# Patient Record
Sex: FEMALE | Race: WHITE | NOT HISPANIC OR LATINO | Employment: FULL TIME | URBAN - METROPOLITAN AREA
[De-identification: names, ages, dates, MRNs, and addresses within clinical notes are randomized per-mention and may not be internally consistent; named-entity substitution may affect disease eponyms.]

---

## 2017-01-02 ENCOUNTER — RESULTS ENCOUNTER (OUTPATIENT)
Dept: FAMILY MEDICINE CLINIC | Facility: CLINIC | Age: 42
End: 2017-01-02

## 2017-01-02 DIAGNOSIS — F98.8 ADD (ATTENTION DEFICIT DISORDER): ICD-10-CM

## 2017-01-02 DIAGNOSIS — I10 ESSENTIAL HYPERTENSION: ICD-10-CM

## 2017-01-02 DIAGNOSIS — M51.26 HERNIATED LUMBAR INTERVERTEBRAL DISC: ICD-10-CM

## 2017-01-02 DIAGNOSIS — Z01.818 PREOPERATIVE CLEARANCE: ICD-10-CM

## 2017-01-02 DIAGNOSIS — G40.109 SIMPLE PARTIAL EPILEPSY (HCC): ICD-10-CM

## 2017-01-10 RX ORDER — ESOMEPRAZOLE MAGNESIUM 40 MG/1
40 CAPSULE, DELAYED RELEASE ORAL
Qty: 30 CAPSULE | Refills: 11 | Status: SHIPPED | OUTPATIENT
Start: 2017-01-10 | End: 2017-04-21 | Stop reason: SDUPTHER

## 2017-01-10 RX ORDER — ESOMEPRAZOLE MAGNESIUM 40 MG/1
40 FOR SUSPENSION ORAL
COMMUNITY
End: 2017-01-10

## 2017-03-02 DIAGNOSIS — F98.8 ADD (ATTENTION DEFICIT DISORDER): ICD-10-CM

## 2017-03-02 RX ORDER — DEXTROAMPHETAMINE SACCHARATE, AMPHETAMINE ASPARTATE, DEXTROAMPHETAMINE SULFATE AND AMPHETAMINE SULFATE 7.5; 7.5; 7.5; 7.5 MG/1; MG/1; MG/1; MG/1
30 TABLET ORAL 2 TIMES DAILY
Qty: 60 TABLET | Refills: 0
Start: 2017-03-02 | End: 2017-04-21

## 2017-03-03 ENCOUNTER — TELEPHONE (OUTPATIENT)
Dept: NEUROLOGY | Facility: CLINIC | Age: 42
End: 2017-03-03

## 2017-03-03 NOTE — TELEPHONE ENCOUNTER
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 Dr. Calvillo does not have any openings until May 8th, but they are doing testing during that time for 2 weeks and she can't take off during testing. She is scheduled for May 30th.  She wanted Dr. Calvillo to know that she is still having seizures.  She would like her to call her on the above number.

## 2017-03-06 RX ORDER — OXCARBAZEPINE 600 MG/1
600 TABLET, FILM COATED ORAL 2 TIMES DAILY
Qty: 60 TABLET | Refills: 5 | Status: SHIPPED | OUTPATIENT
Start: 2017-03-06 | End: 2017-04-05

## 2017-03-06 NOTE — TELEPHONE ENCOUNTER
Called the patient and talked to her and increase the dose of oxcarbazepine to 600 mg by mouth twice a day and told her to call me in 2 weeks and if no improvement in her seizures, will consider third medication for seizure or VNS placement.

## 2017-04-18 ENCOUNTER — TELEPHONE (OUTPATIENT)
Dept: NEUROLOGY | Facility: CLINIC | Age: 42
End: 2017-04-18

## 2017-04-18 NOTE — TELEPHONE ENCOUNTER
----- Message from Kira Wray sent at 4/18/2017 10:40 AM EDT -----  Contact: [phone]  Pt said that she is still having seizures.

## 2017-04-21 ENCOUNTER — OFFICE VISIT (OUTPATIENT)
Dept: FAMILY MEDICINE CLINIC | Facility: CLINIC | Age: 42
End: 2017-04-21

## 2017-04-21 VITALS
SYSTOLIC BLOOD PRESSURE: 110 MMHG | DIASTOLIC BLOOD PRESSURE: 70 MMHG | TEMPERATURE: 97.8 F | RESPIRATION RATE: 16 BRPM | HEART RATE: 91 BPM | HEIGHT: 62 IN | BODY MASS INDEX: 33.86 KG/M2 | WEIGHT: 184 LBS | OXYGEN SATURATION: 98 %

## 2017-04-21 DIAGNOSIS — J45.30 MILD PERSISTENT ASTHMA WITHOUT COMPLICATION: ICD-10-CM

## 2017-04-21 DIAGNOSIS — K21.00 GASTROESOPHAGEAL REFLUX DISEASE WITH ESOPHAGITIS: ICD-10-CM

## 2017-04-21 DIAGNOSIS — J30.89 SEASONAL ALLERGIC RHINITIS DUE TO OTHER ALLERGIC TRIGGER: ICD-10-CM

## 2017-04-21 DIAGNOSIS — F98.8 ADD (ATTENTION DEFICIT DISORDER): Primary | ICD-10-CM

## 2017-04-21 DIAGNOSIS — G31.84 MILD COGNITIVE IMPAIRMENT: ICD-10-CM

## 2017-04-21 DIAGNOSIS — E03.9 ACQUIRED HYPOTHYROIDISM: ICD-10-CM

## 2017-04-21 DIAGNOSIS — I10 ESSENTIAL HYPERTENSION: ICD-10-CM

## 2017-04-21 PROBLEM — J30.2 SEASONAL ALLERGIC RHINITIS: Status: ACTIVE | Noted: 2017-04-21

## 2017-04-21 LAB
25(OH)D3+25(OH)D2 SERPL-MCNC: 38.3 NG/ML (ref 30–100)
ALBUMIN SERPL-MCNC: 4.6 G/DL (ref 3.5–5.2)
ALBUMIN/GLOB SERPL: 1.7 G/DL
ALP SERPL-CCNC: 100 U/L (ref 39–117)
ALT SERPL-CCNC: 15 U/L (ref 1–33)
AST SERPL-CCNC: 12 U/L (ref 1–32)
BASOPHILS # BLD AUTO: 0.03 10*3/MM3 (ref 0–0.2)
BASOPHILS NFR BLD AUTO: 0.6 % (ref 0–1.5)
BILIRUB SERPL-MCNC: 0.3 MG/DL (ref 0.1–1.2)
BUN SERPL-MCNC: 13 MG/DL (ref 6–20)
BUN/CREAT SERPL: 14.6 (ref 7–25)
CALCIUM SERPL-MCNC: 9.7 MG/DL (ref 8.6–10.5)
CHLORIDE SERPL-SCNC: 102 MMOL/L (ref 98–107)
CHOLEST SERPL-MCNC: 230 MG/DL (ref 0–200)
CO2 SERPL-SCNC: 28.8 MMOL/L (ref 22–29)
CREAT SERPL-MCNC: 0.89 MG/DL (ref 0.57–1)
EOSINOPHIL # BLD AUTO: 0.27 10*3/MM3 (ref 0–0.7)
EOSINOPHIL NFR BLD AUTO: 5.3 % (ref 0.3–6.2)
ERYTHROCYTE [DISTWIDTH] IN BLOOD BY AUTOMATED COUNT: 12.7 % (ref 11.7–13)
GLOBULIN SER CALC-MCNC: 2.7 GM/DL
GLUCOSE SERPL-MCNC: 91 MG/DL (ref 65–99)
HCT VFR BLD AUTO: 43.3 % (ref 35.6–45.5)
HDLC SERPL-MCNC: 79 MG/DL (ref 40–60)
HGB BLD-MCNC: 14.2 G/DL (ref 11.9–15.5)
IMM GRANULOCYTES # BLD: 0 10*3/MM3 (ref 0–0.03)
IMM GRANULOCYTES NFR BLD: 0 % (ref 0–0.5)
LDLC SERPL CALC-MCNC: 133 MG/DL (ref 0–100)
LYMPHOCYTES # BLD AUTO: 1.21 10*3/MM3 (ref 0.9–4.8)
LYMPHOCYTES NFR BLD AUTO: 24 % (ref 19.6–45.3)
MCH RBC QN AUTO: 31.6 PG (ref 26.9–32)
MCHC RBC AUTO-ENTMCNC: 32.8 G/DL (ref 32.4–36.3)
MCV RBC AUTO: 96.4 FL (ref 80.5–98.2)
MONOCYTES # BLD AUTO: 0.44 10*3/MM3 (ref 0.2–1.2)
MONOCYTES NFR BLD AUTO: 8.7 % (ref 5–12)
NEUTROPHILS # BLD AUTO: 3.1 10*3/MM3 (ref 1.9–8.1)
NEUTROPHILS NFR BLD AUTO: 61.4 % (ref 42.7–76)
PLATELET # BLD AUTO: 302 10*3/MM3 (ref 140–500)
POTASSIUM SERPL-SCNC: 4.4 MMOL/L (ref 3.5–5.2)
PROT SERPL-MCNC: 7.3 G/DL (ref 6–8.5)
RBC # BLD AUTO: 4.49 10*6/MM3 (ref 3.9–5.2)
SODIUM SERPL-SCNC: 141 MMOL/L (ref 136–145)
T4 FREE SERPL-MCNC: 0.76 NG/DL (ref 0.93–1.7)
TRIGL SERPL-MCNC: 90 MG/DL (ref 0–150)
TSH SERPL DL<=0.005 MIU/L-ACNC: 2.21 MIU/ML (ref 0.27–4.2)
VLDLC SERPL CALC-MCNC: 18 MG/DL (ref 5–40)
WBC # BLD AUTO: 5.05 10*3/MM3 (ref 4.5–10.7)

## 2017-04-21 PROCEDURE — 99213 OFFICE O/P EST LOW 20 MIN: CPT | Performed by: PHYSICIAN ASSISTANT

## 2017-04-21 RX ORDER — MONTELUKAST SODIUM 10 MG/1
10 TABLET ORAL NIGHTLY
Qty: 30 TABLET | Refills: 11 | Status: SHIPPED | OUTPATIENT
Start: 2017-04-21 | End: 2017-05-08 | Stop reason: SDUPTHER

## 2017-04-21 RX ORDER — AMLODIPINE BESYLATE 10 MG/1
10 TABLET ORAL DAILY
Qty: 30 TABLET | Refills: 5 | Status: SHIPPED | OUTPATIENT
Start: 2017-04-21 | End: 2017-05-08 | Stop reason: SDUPTHER

## 2017-04-21 RX ORDER — OXCARBAZEPINE 300 MG/1
600 TABLET, FILM COATED ORAL 2 TIMES DAILY
COMMUNITY
End: 2018-04-02

## 2017-04-21 RX ORDER — ALBUTEROL SULFATE 90 UG/1
2 AEROSOL, METERED RESPIRATORY (INHALATION) EVERY 4 HOURS PRN
Qty: 18 G | Refills: 11 | Status: SHIPPED | OUTPATIENT
Start: 2017-04-21 | End: 2018-03-02 | Stop reason: CLARIF

## 2017-04-21 RX ORDER — ESOMEPRAZOLE MAGNESIUM 40 MG/1
40 CAPSULE, DELAYED RELEASE ORAL
Qty: 30 CAPSULE | Refills: 11 | Status: SHIPPED | OUTPATIENT
Start: 2017-04-21 | End: 2018-07-01 | Stop reason: SDUPTHER

## 2017-04-21 NOTE — PROGRESS NOTES
Subjective   Lissette Rea is a 42 y.o. female.     History of Present Illness   Lissette Rea 42 y.o. female who presents today for routine follow up check and medication refills.  she has a history of   Patient Active Problem List   Diagnosis   • Mild cognitive impairment   • ADD (attention deficit disorder)   • Essential hypertension   • Mild persistent asthma without complication   • Migraine without aura and without status migrainosus, not intractable   • Impaired visual perception   • Gastroesophageal reflux disease with esophagitis   • Abnormal electroencephalogram (EEG)   • Lumbar radiculopathy   • Degeneration of intervertebral disc of lumbar region   • Spinal stenosis of lumbar region   • Herniated lumbar intervertebral disc   • Class 1 obesity   • Postoperative state   • Moderate persistent asthma with acute exacerbation   • Simple partial epilepsy   • Seasonal allergic rhinitis   .  Since the last visit, she has overall felt well.  She has Hypertenision and is well controlled on medication and GERD and is well controlled on PPI medication.  she has been compliant with current medications have reviewed them.  The patient denies medication side effects.  Asthma is controlled with medication and rarely uses Albuterol.   Will refill allergy/asthma    She is not driving  Has seizure d/o and sees neuro        Lissette Rea 42 y.o. female who presents today for ADD type cognitive impairment  she has a history of   Patient Active Problem List   Diagnosis   • Mild cognitive impairment   • ADD (attention deficit disorder)   • Essential hypertension   • Mild persistent asthma without complication   • Migraine without aura and without status migrainosus, not intractable   • Impaired visual perception   • Gastroesophageal reflux disease with esophagitis   • Abnormal electroencephalogram (EEG)   • Lumbar radiculopathy   • Degeneration of intervertebral disc of lumbar region   • Spinal stenosis of lumbar  region   • Herniated lumbar intervertebral disc   • Class 1 obesity   • Postoperative state   • Moderate persistent asthma with acute exacerbation   • Simple partial epilepsy   • Seasonal allergic rhinitis   .    She denies anxiety and depression;  No hx addiction    [facility] neuropsych eval 7-1-15    She reports less focus in last few weeks Adderall.  She will try Vyvanse and will have appt to refill        The following portions of the patient's history were reviewed and updated as appropriate: allergies, current medications, past family history, past medical history, past social history, past surgical history and problem list.    Review of Systems   Constitutional: Negative for activity change, appetite change and unexpected weight change.   HENT: Negative for nosebleeds and trouble swallowing.    Eyes: Negative for pain and visual disturbance.   Respiratory: Negative for chest tightness, shortness of breath and wheezing.    Cardiovascular: Negative for chest pain and palpitations.   Gastrointestinal: Negative for abdominal pain and blood in stool.   Endocrine: Negative.    Genitourinary: Negative for difficulty urinating and hematuria.   Musculoskeletal: Negative for joint swelling.   Skin: Negative for color change and rash.   Allergic/Immunologic: Negative.    Neurological: Positive for seizures. Negative for syncope and speech difficulty.   Hematological: Negative for adenopathy.   Psychiatric/Behavioral: Negative for agitation and confusion.   All other systems reviewed and are negative.      Objective   Physical Exam   Constitutional: She is oriented to person, place, and time. She appears well-developed and well-nourished. No distress.   HENT:   Head: Normocephalic and atraumatic.   Mouth/Throat: No oropharyngeal exudate.   Eyes: Conjunctivae and EOM are normal. Pupils are equal, round, and reactive to light. Right eye exhibits no discharge. Left eye exhibits no discharge. No scleral icterus.    Neck: Normal range of motion. Neck supple. No tracheal deviation present. No thyromegaly present.   Cardiovascular: Normal rate, regular rhythm, normal heart sounds, intact distal pulses and normal pulses.  Exam reveals no gallop.    No murmur heard.  Pulmonary/Chest: Effort normal and breath sounds normal. No respiratory distress. She has no wheezes. She has no rales.   Musculoskeletal: Normal range of motion.   Neurological: She is alert and oriented to person, place, and time. She exhibits normal muscle tone. Coordination normal.   Skin: Skin is warm. No rash noted. No erythema. No pallor.   Psychiatric: She has a normal mood and affect. Her behavior is normal. Judgment and thought content normal.   Nursing note and vitals reviewed.      Assessment/Plan   Problems Addressed this Visit        Cardiovascular and Mediastinum    Essential hypertension    Relevant Medications    amLODIPine (NORVASC) 10 MG tablet    levothyroxine (LEVOTHROID) 25 MCG tablet    Other Relevant Orders    Comprehensive metabolic panel (Completed)    Lipid panel (Completed)    CBC and Differential (Completed)    TSH (Completed)    T4, Free (Completed)    Vitamin D 25 Hydroxy (Completed)    T4, Free    TSH       Respiratory    Mild persistent asthma without complication    Relevant Medications    albuterol (PROVENTIL HFA;VENTOLIN HFA) 108 (90 BASE) MCG/ACT inhaler    montelukast (SINGULAIR) 10 MG tablet    fluticasone-salmeterol (ADVAIR DISKUS) 250-50 MCG/DOSE DISKUS    levothyroxine (LEVOTHROID) 25 MCG tablet    Other Relevant Orders    Comprehensive metabolic panel (Completed)    Lipid panel (Completed)    CBC and Differential (Completed)    TSH (Completed)    T4, Free (Completed)    Vitamin D 25 Hydroxy (Completed)    T4, Free    TSH    Seasonal allergic rhinitis    Relevant Medications    levothyroxine (LEVOTHROID) 25 MCG tablet    Other Relevant Orders    Comprehensive metabolic panel (Completed)    Lipid panel (Completed)    CBC and  Differential (Completed)    TSH (Completed)    T4, Free (Completed)    Vitamin D 25 Hydroxy (Completed)    T4, Free    TSH       Digestive    Gastroesophageal reflux disease with esophagitis    Relevant Medications    esomeprazole (NEXIUM) 40 MG capsule    levothyroxine (LEVOTHROID) 25 MCG tablet    Other Relevant Orders    Comprehensive metabolic panel (Completed)    Lipid panel (Completed)    CBC and Differential (Completed)    TSH (Completed)    T4, Free (Completed)    Vitamin D 25 Hydroxy (Completed)    T4, Free    TSH       Nervous and Auditory    Mild cognitive impairment    Relevant Medications    levothyroxine (LEVOTHROID) 25 MCG tablet    Other Relevant Orders    Comprehensive metabolic panel (Completed)    Lipid panel (Completed)    CBC and Differential (Completed)    TSH (Completed)    T4, Free (Completed)    Vitamin D 25 Hydroxy (Completed)    T4, Free    TSH       Other    ADD (attention deficit disorder) - Primary    Relevant Medications    levothyroxine (LEVOTHROID) 25 MCG tablet    lisdexamfetamine (VYVANSE) 50 MG capsule    Other Relevant Orders    Comprehensive metabolic panel (Completed)    Lipid panel (Completed)    CBC and Differential (Completed)    TSH (Completed)    T4, Free (Completed)    Vitamin D 25 Hydroxy (Completed)    T4, Free    TSH      Other Visit Diagnoses     Acquired hypothyroidism        Relevant Medications    levothyroxine (LEVOTHROID) 25 MCG tablet                I have reviewed the notes, assessments, and/or procedures performed by Trang Doherty PA-C, I concur with her/his documentation of Lissette Rea.

## 2017-04-21 NOTE — PATIENT INSTRUCTIONS
Stop Adderall and change to Vyvanse 50mg;  appt to refill or change  Stop Vyvanse if it makes your heart pound and/or race.  It can effect you being able to fall asleep.  It can be habit forming.  Do not share you medication with anyone.  Do not drink alcohol with this medication.  Use the lowest effective dose.    Low glycemic index diet  Exercise 30 minutes most days of the week  Make sure you get results on any labs or tests we ordered today  We discussed medications and how to take them as prescribed  Sleep 6-8 hours each night if possible  If you have not signed up for [employer], please activate your code ASAP from your After Visit Summary today    LDL goal <100  LDL goal if heart disease <70  HDL goal >60  Triglyceride goal <150  BP goal =<130/80  Fasting glucose <100

## 2017-04-22 RX ORDER — LEVOTHYROXINE SODIUM 0.03 MG/1
25 TABLET ORAL DAILY
Qty: 90 TABLET | Refills: 3 | Status: SHIPPED | OUTPATIENT
Start: 2017-04-22 | End: 2017-06-22

## 2017-04-28 ENCOUNTER — TELEPHONE (OUTPATIENT)
Dept: NEUROLOGY | Facility: CLINIC | Age: 42
End: 2017-04-28

## 2017-04-28 DIAGNOSIS — G40.109 SIMPLE PARTIAL EPILEPSY (HCC): Primary | ICD-10-CM

## 2017-04-28 NOTE — TELEPHONE ENCOUNTER
Called the patient and talked to her and will start her on Vimpat 50 mg by mouth twice a day and told her to come and  the samples on Monday and will refer her to Dr. Wesley for seizure management.

## 2017-04-28 NOTE — TELEPHONE ENCOUNTER
----- Message from Kira Wray sent at 4/28/2017  3:00 PM EDT -----  Contact: [phone]  Dr. Calvillo is referring this patient to  for seizures.

## 2017-05-08 RX ORDER — MONTELUKAST SODIUM 10 MG/1
10 TABLET ORAL NIGHTLY
Qty: 90 TABLET | Refills: 3 | Status: SHIPPED | OUTPATIENT
Start: 2017-05-08 | End: 2018-08-07 | Stop reason: SDUPTHER

## 2017-05-08 RX ORDER — AMLODIPINE BESYLATE 10 MG/1
10 TABLET ORAL DAILY
Qty: 90 TABLET | Refills: 1 | Status: SHIPPED | OUTPATIENT
Start: 2017-05-08 | End: 2018-05-07 | Stop reason: SDUPTHER

## 2017-05-12 RX ORDER — LACOSAMIDE 50 MG/1
50 TABLET ORAL EVERY 12 HOURS SCHEDULED
Qty: 60 TABLET | Refills: 5 | Status: SHIPPED | OUTPATIENT
Start: 2017-05-12 | End: 2018-04-02

## 2017-05-15 RX ORDER — ZONISAMIDE 100 MG/1
200 CAPSULE ORAL DAILY
Qty: 180 CAPSULE | Refills: 3 | Status: SHIPPED | OUTPATIENT
Start: 2017-05-15 | End: 2017-10-30

## 2017-05-30 ENCOUNTER — OFFICE VISIT (OUTPATIENT)
Dept: FAMILY MEDICINE CLINIC | Facility: CLINIC | Age: 42
End: 2017-05-30

## 2017-05-30 VITALS
TEMPERATURE: 98.2 F | HEIGHT: 62 IN | OXYGEN SATURATION: 98 % | RESPIRATION RATE: 16 BRPM | WEIGHT: 184 LBS | SYSTOLIC BLOOD PRESSURE: 110 MMHG | HEART RATE: 83 BPM | BODY MASS INDEX: 33.86 KG/M2 | DIASTOLIC BLOOD PRESSURE: 80 MMHG

## 2017-05-30 DIAGNOSIS — E03.9 ACQUIRED HYPOTHYROIDISM: ICD-10-CM

## 2017-05-30 DIAGNOSIS — F98.8 ADD (ATTENTION DEFICIT DISORDER): Primary | ICD-10-CM

## 2017-05-30 PROCEDURE — 99213 OFFICE O/P EST LOW 20 MIN: CPT | Performed by: PHYSICIAN ASSISTANT

## 2017-06-01 ENCOUNTER — APPOINTMENT (OUTPATIENT)
Dept: WOMENS IMAGING | Facility: HOSPITAL | Age: 42
End: 2017-06-01

## 2017-06-01 PROCEDURE — 76641 ULTRASOUND BREAST COMPLETE: CPT | Performed by: RADIOLOGY

## 2017-06-21 LAB
T4 FREE SERPL-MCNC: 0.79 NG/DL (ref 0.93–1.7)
TSH SERPL DL<=0.005 MIU/L-ACNC: 1.7 MIU/ML (ref 0.27–4.2)

## 2017-06-22 DIAGNOSIS — E03.9 ACQUIRED HYPOTHYROIDISM: Primary | ICD-10-CM

## 2017-06-22 RX ORDER — LEVOTHYROXINE SODIUM 0.05 MG/1
50 TABLET ORAL DAILY
Qty: 90 TABLET | Refills: 3 | Status: SHIPPED | OUTPATIENT
Start: 2017-06-22 | End: 2018-07-01 | Stop reason: SDUPTHER

## 2017-06-27 ENCOUNTER — OFFICE VISIT (OUTPATIENT)
Dept: FAMILY MEDICINE CLINIC | Facility: CLINIC | Age: 42
End: 2017-06-27

## 2017-06-27 VITALS
SYSTOLIC BLOOD PRESSURE: 124 MMHG | DIASTOLIC BLOOD PRESSURE: 78 MMHG | WEIGHT: 181 LBS | HEIGHT: 62 IN | RESPIRATION RATE: 16 BRPM | TEMPERATURE: 97.9 F | BODY MASS INDEX: 33.31 KG/M2 | OXYGEN SATURATION: 98 % | HEART RATE: 79 BPM

## 2017-06-27 DIAGNOSIS — F98.8 ADD (ATTENTION DEFICIT DISORDER): ICD-10-CM

## 2017-06-27 DIAGNOSIS — E03.9 ACQUIRED HYPOTHYROIDISM: Primary | ICD-10-CM

## 2017-06-27 PROCEDURE — 99213 OFFICE O/P EST LOW 20 MIN: CPT | Performed by: PHYSICIAN ASSISTANT

## 2017-06-27 NOTE — PATIENT INSTRUCTIONS
Labs 6 weeks for thyroid    Stop Vyvanse if it makes your heart pound and/or race.  It can effect you being able to fall asleep.  It can be habit forming.  Do not share you medication with anyone.  Do not drink alcohol with this medication.  Use the lowest effective dose.

## 2017-06-27 NOTE — PROGRESS NOTES
Subjective   Lissette Rea is a 42 y.o. female.     History of Present Illness   Lissette Rea 42 y.o. female who presents today for routine follow up check and medication refills.  she has a history of   Patient Active Problem List   Diagnosis   • Mild cognitive impairment   • ADD (attention deficit disorder)   • Essential hypertension   • Mild persistent asthma without complication   • Migraine without aura and without status migrainosus, not intractable   • Impaired visual perception   • Gastroesophageal reflux disease with esophagitis   • Abnormal electroencephalogram (EEG)   • Lumbar radiculopathy   • Degeneration of intervertebral disc of lumbar region   • Spinal stenosis of lumbar region   • Herniated lumbar intervertebral disc   • Class 1 obesity   • Postoperative state   • Moderate persistent asthma with acute exacerbation   • Simple partial epilepsy   • Seasonal allergic rhinitis   .  Since the last visit, she has overall felt well.  She has hypothyroidism and here to discuss abnormal lab results.  she has been compliant with current medications have reviewed them.  The patient denies medication side effects.    Lab Results   Component Value Date    TSH 1.700 06/21/2017     Last free T 4 was about the same at 0.79  I did raise thyroid dose to 50mcg with labs to follow in 6 weeks;  If no improvement, will consult endocrine.      Lissette Rea 42 y.o. female who presents for ADD/ADHD follow up from dose increase last visit. She is noting Vyvanse is more effective than Adderall.  She is to change to Dr. Wesley for f/u on epilepsy  No new problems with insomnia, tachycardia,or weight loss. The patient has read and signed the [facility] Controlled Substance Contract. I will continue to see patient for regular follow up appointments and give the lowest effective dose. They are well controlled on their medication at the lowest effective dose. She has a previous history of a diagnosis of ADD/ADHD.  Dx 2015 Jess GOLD has been reviewed by me and is updated every 3 months. She is on Vimpat now. The patient is aware of the potential for addiction and dependence. The Rx continues to help them concentrate, finish tasks in timely manor, and succeed. She denies current suicidal and homicidal ideation.  Reviewed causes for potential of discontinuation of stimulant medication, including but not limited to consideration for diversion, obtaining other controlled substances from other license practitioners, or inappropriate office behavior. Patient understands to use a controlled substance as prescribed by physician and to avoid improper use of controlled substances. Patient will also verbalized understanding that prescriptions to be filled at the same pharmacy unless office staff is made aware of this. Patient understands they can be submitted to random urine drug screens and/or random pill counts on any request. Patient understands they are not to receive early refills. The patient must produce an official police report for any effort to replace controlled substances that are lost or stolen. No use of illegal street drugs while receiving controlled substances from this prescribing provider. The patient is to take medication as prescribed and not deviate from the normal schedule without consultation from the provider. Patient is not to share the medication with others or to take medication with alcohol or other sedatives. Use caution when driving or operating machinery. Must always keep the prescription in the original container. Patient is to store controlled substances in a locked cabinet or other secure storage unit that is cool, dry and out of sunlight. Patient must immediately notify office if this controlled substances is improperly taken by another individual. Reviewed risk for physical dependence, tolerance and addiction.   There is no evidence of aberrant behavior or any red flags. .      No family or  personal history of addiction.  [facility]uropsych eval 7-1-15  The following portions of the patient's history were reviewed and updated as appropriate: allergies, current medications, past family history, past medical history, past social history, past surgical history and problem list.    Review of Systems   Constitutional: Negative for activity change, appetite change and unexpected weight change.   HENT: Negative for nosebleeds and trouble swallowing.    Eyes: Negative for pain and visual disturbance.   Respiratory: Negative for chest tightness, shortness of breath and wheezing.    Cardiovascular: Negative for chest pain and palpitations.   Gastrointestinal: Negative for abdominal pain and blood in stool.   Endocrine: Negative.    Genitourinary: Negative for difficulty urinating and hematuria.   Musculoskeletal: Negative for joint swelling.   Skin: Negative for color change and rash.   Allergic/Immunologic: Negative.    Neurological: Negative for syncope and speech difficulty.   Hematological: Negative for adenopathy.   Psychiatric/Behavioral: Positive for self-injury. Negative for agitation and confusion.   All other systems reviewed and are negative.      Objective   Physical Exam   Constitutional: She is oriented to person, place, and time. She appears well-developed and well-nourished. No distress.   HENT:   Head: Normocephalic.   Eyes: Conjunctivae and EOM are normal. Pupils are equal, round, and reactive to light.   Neck: Normal range of motion. Neck supple.   Cardiovascular: Normal rate, regular rhythm and normal heart sounds.    No murmur heard.  Pulmonary/Chest: Effort normal and breath sounds normal.   Musculoskeletal: Normal range of motion.   Neurological: She is alert and oriented to person, place, and time.   Skin: Skin is warm and dry. No rash noted. She is not diaphoretic.   Psychiatric: Her behavior is normal. Judgment and thought content normal. Her affect is not inappropriate. She  is not actively hallucinating. Cognition and memory are normal.   Nursing note and vitals reviewed.      Assessment/Plan   Problems Addressed this Visit        Other    ADD (attention deficit disorder)    Relevant Medications    lisdexamfetamine (VYVANSE) 60 MG capsule      Other Visit Diagnoses     Acquired hypothyroidism    -  Primary                I have reviewed the notes, assessments, and/or procedures performed by Trang Doherty PA-C, I concur with her/his documentation of Lissette Rea.

## 2017-06-30 ENCOUNTER — APPOINTMENT (OUTPATIENT)
Dept: LAB | Facility: HOSPITAL | Age: 42
End: 2017-06-30

## 2017-06-30 ENCOUNTER — OFFICE VISIT (OUTPATIENT)
Dept: NEUROLOGY | Facility: CLINIC | Age: 42
End: 2017-06-30

## 2017-06-30 VITALS
HEART RATE: 82 BPM | DIASTOLIC BLOOD PRESSURE: 80 MMHG | OXYGEN SATURATION: 97 % | HEIGHT: 62 IN | BODY MASS INDEX: 32.9 KG/M2 | SYSTOLIC BLOOD PRESSURE: 144 MMHG | WEIGHT: 178.8 LBS

## 2017-06-30 DIAGNOSIS — F98.8 ADD (ATTENTION DEFICIT DISORDER): ICD-10-CM

## 2017-06-30 DIAGNOSIS — G31.84 MILD COGNITIVE IMPAIRMENT: ICD-10-CM

## 2017-06-30 DIAGNOSIS — G40.109 SIMPLE PARTIAL SEIZURE, CONSCIOUSNESS NOT IMPAIRED (HCC): ICD-10-CM

## 2017-06-30 DIAGNOSIS — R94.01 ABNORMAL ELECTROENCEPHALOGRAM (EEG): Primary | ICD-10-CM

## 2017-06-30 DIAGNOSIS — D32.9 MENINGIOMA (HCC): ICD-10-CM

## 2017-06-30 DIAGNOSIS — G43.009 MIGRAINE WITHOUT AURA AND WITHOUT STATUS MIGRAINOSUS, NOT INTRACTABLE: ICD-10-CM

## 2017-06-30 PROBLEM — M43.26 ANKYLOSIS OF LUMBAR SPINE: Status: ACTIVE | Noted: 2017-01-19

## 2017-06-30 PROBLEM — Z98.890 STATUS POST LUMBAR SPINE OPERATION: Status: ACTIVE | Noted: 2017-06-13

## 2017-06-30 LAB
ANION GAP SERPL CALCULATED.3IONS-SCNC: 11.1 MMOL/L
BUN BLD-MCNC: 15 MG/DL (ref 6–20)
BUN/CREAT SERPL: 15.3 (ref 7–25)
CALCIUM SPEC-SCNC: 9.1 MG/DL (ref 8.6–10.5)
CHLORIDE SERPL-SCNC: 106 MMOL/L (ref 98–107)
CO2 SERPL-SCNC: 24.9 MMOL/L (ref 22–29)
CREAT BLD-MCNC: 0.98 MG/DL (ref 0.57–1)
GFR SERPL CREATININE-BSD FRML MDRD: 62 ML/MIN/1.73
GLUCOSE BLD-MCNC: 100 MG/DL (ref 65–99)
POTASSIUM BLD-SCNC: 3.9 MMOL/L (ref 3.5–5.2)
SODIUM BLD-SCNC: 142 MMOL/L (ref 136–145)

## 2017-06-30 PROCEDURE — 36415 COLL VENOUS BLD VENIPUNCTURE: CPT | Performed by: PSYCHIATRY & NEUROLOGY

## 2017-06-30 PROCEDURE — 80183 DRUG SCRN QUANT OXCARBAZEPIN: CPT | Performed by: PSYCHIATRY & NEUROLOGY

## 2017-06-30 PROCEDURE — 80048 BASIC METABOLIC PNL TOTAL CA: CPT | Performed by: PSYCHIATRY & NEUROLOGY

## 2017-06-30 PROCEDURE — 99215 OFFICE O/P EST HI 40 MIN: CPT | Performed by: PSYCHIATRY & NEUROLOGY

## 2017-06-30 PROCEDURE — 80203 DRUG SCREEN QUANT ZONISAMIDE: CPT | Performed by: PSYCHIATRY & NEUROLOGY

## 2017-06-30 NOTE — PROGRESS NOTES
Subjective:     Patient ID: Lissette Rea is a 42 y.o. female.    Seizures    Associated symptoms include confusion. Pertinent negatives include no headaches, no speech difficulty, no visual disturbance, no chest pain and no nausea.     The following portions of the patient's history were reviewed and updated as appropriate: allergies, current medications, past family history, past medical history, past social history, past surgical history and problem list.  Very complex case with multiple medical issues    Possible seizures: She states that she began having sensations of smelling smoke a couple of years ago either in 2014 or 2015.  She saw Dr. Randy LAKE.  Brain MRI from August 2015 showed a small left frontal meningioma.  EEG showed spike and wave activity in the left temporal region.  She was not started on seizure medicine.    She complained of memory problems.  She was on Topamax 200 and Elavil 50 daily when she underwent neuropsychology testing on November 29, 2015.  The results showed full scale IQ 80.  Verbal IQ 78.  Performance IQ 80.  Working memory 71.  Performance index 100.  She was thought to have a combination of ADHD and mild cognitive impairment.  She has continued to work as an .  She has not started medications for memory.  She has a variety of memory complaints the were reviewed from the office note of Dr. PHILLIPS  during the first office visit here.  Mostly they involve word finding but also mistaking names or forgetting items.  In 2015 had normal blood tests for B12 and methylmalonic acid    Migraine: Chronic problem and has been well-controlled in the past on Cambio and currently on zonisamide 200 per day.  Migraines are not an issue.    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 Lora twice, eventually requiring fusion from which she has done quite well and low back pain not a problem currently    Irritable bowel  "syndrome-has controlled the symptoms with medications as listed.  Doesn't feel this is much for problem currently    Asthma-not a problem currently.  Hypertension-controlled on medicine.  Hypothyroidism-on medication with rising T4.  Level    Seizure description: She will have clusters of spells where she will \"smell smoke\".  Spells can last seconds up to 5 minutes.  She can have several in a day, or cluster of several days in a row and then nothing or no spells whatsoever for up to a month.  They're not associated with headache but the more intense spells cause nausea.  The spells can build slowly or can come on suddenly with nausea.  Some spells last up to 15 minutes.  She does not have staring following tongue biting or other usual manifestations of seizures.  They were not associated with a head injury or encephalitis.  She denies any loss of sense of smell.    EEG review: Dr. Lester had 2 EEGs done in 2015-16 showing left frontotemporal spikes.  An angled for EEG through [facility] showed polymorphic delta intermittently, left temporal.    Medications for epilepsy: When seen by Dr. PHILLIPS last November she was already on zonisamide instead of Topamax and was no longer taking Elavil.  Ox carb was added at 300 twice a day.  There was no change in her symptoms.  Last April Vimpat was added at 50 twice a day.  There was still no change in her symptoms.    Mood and depression: She is on her second marriage.  Her  is here today.  The marriage is successful.  She had transient depression associated with the first marriage/divorce but not currently a problem    ADHD: After the diagnosis was made by neuropsych testing she underwent a series of medication treatments.  Initially she seemed to improve it then she has fallen back to her old memory symptoms.  She has now on Vyvanse 60 mg per day      Review of Systems   Constitutional: Negative for activity change, appetite change and fatigue.   HENT: Negative for ear pain, " facial swelling and trouble swallowing.    Eyes: Negative for photophobia, pain and visual disturbance.   Respiratory: Negative for choking, chest tightness and shortness of breath.    Cardiovascular: Negative for chest pain, palpitations and leg swelling.   Gastrointestinal: Negative for abdominal pain, constipation and nausea.   Endocrine: Negative for polydipsia, polyphagia and polyuria.   Genitourinary: Negative for difficulty urinating, frequency and urgency.   Musculoskeletal: Positive for gait problem. Negative for back pain and neck pain.   Skin: Negative for color change, rash and wound.   Allergic/Immunologic: Negative for environmental allergies, food allergies and immunocompromised state.   Neurological: Positive for seizures. Negative for dizziness, tremors, syncope, facial asymmetry, speech difficulty, weakness, light-headedness, numbness and headaches.   Hematological: Negative for adenopathy. Does not bruise/bleed easily.   Psychiatric/Behavioral: Positive for confusion. Negative for agitation, behavioral problems, decreased concentration, dysphoric mood, hallucinations, self-injury, sleep disturbance and suicidal ideas. The patient is not nervous/anxious and is not hyperactive.         Objective:    Neurologic Exam  This patient was well-developed, well-nourished and in no acute distress.      GAIT:  Gait, station, heel, toe and tandem walk were normal.  There was no drift of the arms.  Romberg’s sign was not present.      VASCULAR: The carotid arteries had normal upstroke to palpation without bruits.  The vertebral arteries were without bruits.  The radial pulses were equal and without delay.  Cardiac examination revealed no murmurs with a regular rhythm.  Pedal pulses were normal.  The extremities were without cyanosis, clubbing or edema.    MENTAL STATUS: This patient was alert and oriented to person, place, time and situation.  Recent and remote memory -  intact.  Attention span, fund of  knowledge and concentration were normal.  Comprehension, naming, reading, repetition, and language were normal.  Fund of knowledge was intact.    CRANIAL NERVES:  Olfaction-not tested.  Visual fields full in all quadrants to confrontation.  The pupils were equally round and reactive to light at 3-4 mm.  There was no evidence of a Maxx Pasha pupil.  Funduscopic exam revealed no papilledema, hemorrhage or exudate.  The gaze was conjugate. The vertical and horizontal eye movements were full without nystagmus.  There was no facial weakness.  There was no facial sensory loss to pinprick bilaterally.  Hearing was normal for light finger rub and casual speech.  Speech is normal without dysarthria.  The neck is supple and strength is normal in rotation, flexion and extension.  Tongue and palate movements are normal.    MOTOR UPPER EXTREMTIES:   Bilaterally the deltoid, biceps, triceps, wrist extensors, intrinsic hand muscles, and  were grade 5 and normal.  Bulk, tone and strength of these muscles were normal without abnormal movements.    MOTOR LOWER EXTREMITIES: Bilaterally the hip flexors, hip abductors, knee flexors and extensors, ankle plantar flexors and dorsiflexors were grade 5 with normal. Bulk, tone and strength of these muscles were normal without abnormal movements.  DEEP TENDON REFLEXES:  Bilateral biceps, triceps, and brachioradialis reflexes were grade 1 symmetric.  Bilateral knee, hamstrings and ankle reflexes were grade 1 and symmetric.  The plantar responses were downgoing.  Ankle clonus was not present.    CEREBELLAR:  Finger to nose, rapid finger opposition, and heel-to-shin tests were performed normally, bilaterally.    MUSCULOSKELETAL:  Normal range of motion of the neck is noted.  There was no back pain with straight leg raise.  Internal and external rotation of the hips was normal.     SENSORY: There was normal upper and lower extremity sensation to vibration, proprioception, and  pinprick.  HIGHER CORTICAL FUNCTION: There were no aphasic or apraxic errors.  Visual fields were intact to confrontation.      Physical Exam   Constitutional: She appears well-developed and well-nourished.   Neck: Normal range of motion. Neck supple.   Cardiovascular: Normal rate.    Pulmonary/Chest: Effort normal.   Psychiatric: She has a normal mood and affect. Her behavior is normal. Judgment and thought content normal.   Nursing note and vitals reviewed.      Assessment/Plan:       Problems Addressed this Visit        Unprioritized    Mild cognitive impairment    ADD (attention deficit disorder)    Migraine without aura and without status migrainosus, not intractable    Abnormal electroencephalogram (EEG) - Primary    Simple partial seizure, consciousness not impaired    Relevant Orders    Zonisamide Level    Oxcarbazepine Level    Basic Metabolic Panel    Meningioma           Meningioma-I showed her that it was tiny, left frontal, does not involve the uncus.  Uncus is the usual region for olfactory auras and seizures.  She has no loss of some of smell tested with soap today.  It is not clear to me that these are definitely seizures.  The meningioma is likely unrelated but could have caused the slowing on the EEG.    She may need monitoring off of medications with video EEG.  She is hesitant to discontinue Zonegran because it helps her migraine so much.      Plan: Discontinue Vimpat.  Check a blood level of Trileptal and zonisamide today.  Then we will push the ox carb into the therapeutic range.  If the spells do not resolve we will discontinue oxcarbazepine and plan on video EEG.  She is still not driving.. We discussed no Sabril effect the can occur with abnormal x-ray results I'm spent reviewing records 20 minutes.  Time spent counseling and coordinating care-greater than 50% of today's 1 hour appointment

## 2017-07-03 LAB — ZONISAMIDE SERPL-MCNC: 6.3 UG/ML (ref 10–40)

## 2017-07-04 LAB — OXCARBAZEPINE: 35 UG/ML (ref 10–35)

## 2017-08-03 ENCOUNTER — RESULTS ENCOUNTER (OUTPATIENT)
Dept: FAMILY MEDICINE CLINIC | Facility: CLINIC | Age: 42
End: 2017-08-03

## 2017-08-03 DIAGNOSIS — E03.9 ACQUIRED HYPOTHYROIDISM: ICD-10-CM

## 2017-08-27 LAB
T4 FREE SERPL-MCNC: 0.92 NG/DL (ref 0.82–1.77)
TSH SERPL DL<=0.005 MIU/L-ACNC: 0.52 UIU/ML (ref 0.45–4.5)

## 2017-09-03 ENCOUNTER — PATIENT MESSAGE (OUTPATIENT)
Dept: FAMILY MEDICINE CLINIC | Facility: CLINIC | Age: 42
End: 2017-09-03

## 2017-09-03 DIAGNOSIS — F90.9 ATTENTION DEFICIT HYPERACTIVITY DISORDER (ADHD), UNSPECIFIED ADHD TYPE: Primary | ICD-10-CM

## 2017-10-10 ENCOUNTER — OFFICE VISIT (OUTPATIENT)
Dept: FAMILY MEDICINE CLINIC | Facility: CLINIC | Age: 42
End: 2017-10-10

## 2017-10-10 VITALS
RESPIRATION RATE: 16 BRPM | OXYGEN SATURATION: 100 % | HEIGHT: 62 IN | DIASTOLIC BLOOD PRESSURE: 76 MMHG | WEIGHT: 177 LBS | HEART RATE: 71 BPM | TEMPERATURE: 98.6 F | BODY MASS INDEX: 32.57 KG/M2 | SYSTOLIC BLOOD PRESSURE: 114 MMHG

## 2017-10-10 DIAGNOSIS — E03.9 ACQUIRED HYPOTHYROIDISM: ICD-10-CM

## 2017-10-10 DIAGNOSIS — F98.8 ATTENTION DEFICIT DISORDER (ADD) WITHOUT HYPERACTIVITY: ICD-10-CM

## 2017-10-10 DIAGNOSIS — I10 ESSENTIAL HYPERTENSION: Primary | ICD-10-CM

## 2017-10-10 DIAGNOSIS — F90.9 ATTENTION DEFICIT HYPERACTIVITY DISORDER (ADHD), UNSPECIFIED ADHD TYPE: ICD-10-CM

## 2017-10-10 DIAGNOSIS — L98.9 SKIN LESION: ICD-10-CM

## 2017-10-10 PROCEDURE — 99213 OFFICE O/P EST LOW 20 MIN: CPT | Performed by: PHYSICIAN ASSISTANT

## 2017-10-10 NOTE — PATIENT INSTRUCTIONS
Stop Vyvanse if it makes your heart pound and/or race.  It can effect you being able to fall asleep.  It can be habit forming.  Do not share you medication with anyone.  Do not drink alcohol with this medication.  Use the lowest effective dose.

## 2017-10-10 NOTE — PROGRESS NOTES
Subjective   Lissette Rea is a 42 y.o. female.     History of Present Illness   Lissette Rea 42 y.o. female who presents for ADD/ADHD follow up from dose increase last visit. She is noting Vyvanse is more effective than Adderall.  She is to change to Dr. Wesley for f/u on epilepsy  No new problems with insomnia, tachycardia,or weight loss. The patient has read and signed the [facility] Controlled Substance Contract. I will continue to see patient for regular follow up appointments and give the lowest effective dose. They are well controlled on their medication at the lowest effective dose. She has a previous history of a diagnosis of ADD/ADHD. Dx 2015 Jess GOLD has been reviewed by me and is updated every 3 months. She is on Vimpat now. The patient is aware of the potential for addiction and dependence. The Rx continues to help them concentrate, finish tasks in timely manor, and succeed. She denies current suicidal and homicidal ideation.  Reviewed causes for potential of discontinuation of stimulant medication, including but not limited to consideration for diversion, obtaining other controlled substances from other license practitioners, or inappropriate office behavior. Patient understands to use a controlled substance as prescribed by physician and to avoid improper use of controlled substances. Patient will also verbalized understanding that prescriptions to be filled at the same pharmacy unless office staff is made aware of this. Patient understands they can be submitted to random urine drug screens and/or random pill counts on any request. Patient understands they are not to receive early refills. The patient must produce an official police report for any effort to replace controlled substances that are lost or stolen. No use of illegal street drugs while receiving controlled substances from this prescribing provider. The patient is to take medication as prescribed and not deviate from the  normal schedule without consultation from the provider. Patient is not to share the medication with others or to take medication with alcohol or other sedatives. Use caution when driving or operating machinery. Must always keep the prescription in the original container. Patient is to store controlled substances in a locked cabinet or other secure storage unit that is cool, dry and out of sunlight. Patient must immediately notify office if this controlled substances is improperly taken by another individual. Reviewed risk for physical dependence, tolerance and addiction.   There is no evidence of aberrant behavior or any red flags. .       No family or personal history of addiction.  Matthew and  neuropsych aga 7-1-15    Lab Results   Component Value Date    TSH 0.515 08/26/2017     Free T4 at goal 0.92    bp controlled on Rx  2013 AHA (American Heart Association) Cholesterol Risk Ratio Score Goal is <=7.5% and your score is:  0.5%    Area left scapula for a month and itching;  Refer to derm    Sees DR Wesley for neuro  The following portions of the patient's history were reviewed and updated as appropriate: allergies, current medications, past family history, past medical history, past social history, past surgical history and problem list.    Review of Systems   Constitutional: Negative for activity change, appetite change and unexpected weight change.   HENT: Negative for nosebleeds and trouble swallowing.    Eyes: Negative for pain and visual disturbance.   Respiratory: Negative for chest tightness, shortness of breath and wheezing.    Cardiovascular: Negative for chest pain and palpitations.   Gastrointestinal: Negative for abdominal pain and blood in stool.   Endocrine: Negative.    Genitourinary: Negative for difficulty urinating and hematuria.   Musculoskeletal: Negative for joint swelling.   Skin: Positive for rash. Negative for color change.   Allergic/Immunologic: Negative.    Neurological: Positive  for seizures. Negative for syncope and speech difficulty.   Hematological: Negative for adenopathy.   Psychiatric/Behavioral: Positive for decreased concentration. Negative for agitation and confusion.   All other systems reviewed and are negative.      Objective   Physical Exam   Constitutional: She is oriented to person, place, and time. She appears well-developed and well-nourished. No distress.   HENT:   Head: Normocephalic.   Eyes: Conjunctivae and EOM are normal. Pupils are equal, round, and reactive to light.   Neck: Normal range of motion. Neck supple.   Cardiovascular: Normal rate, regular rhythm and normal heart sounds.    No murmur heard.  Pulmonary/Chest: Effort normal and breath sounds normal.   Musculoskeletal: Normal range of motion.   Neurological: She is alert and oriented to person, place, and time.   Skin: Skin is warm and dry. Rash (left scapula pink papular area and vesicular center; about 1cm) noted. She is not diaphoretic.   Psychiatric: She has a normal mood and affect. Her behavior is normal. Judgment and thought content normal. Her affect is not inappropriate. She is not actively hallucinating. Cognition and memory are normal.   Nursing note and vitals reviewed.      Assessment/Plan   Lissette was seen today for add.    Diagnoses and all orders for this visit:    Essential hypertension    Acquired hypothyroidism    Attention deficit disorder (ADD) without hyperactivity    Skin lesion  -     Ambulatory Referral to Dermatology              I have reviewed the notes, assessments, and/or procedures performed by Trang Doherty PA-C, I concur with her/his documentation of Lissette Rea.

## 2017-10-30 ENCOUNTER — OFFICE VISIT (OUTPATIENT)
Dept: NEUROLOGY | Facility: CLINIC | Age: 42
End: 2017-10-30

## 2017-10-30 ENCOUNTER — APPOINTMENT (OUTPATIENT)
Dept: WOMENS IMAGING | Facility: HOSPITAL | Age: 42
End: 2017-10-30

## 2017-10-30 VITALS
OXYGEN SATURATION: 97 % | HEART RATE: 80 BPM | SYSTOLIC BLOOD PRESSURE: 132 MMHG | BODY MASS INDEX: 32.06 KG/M2 | HEIGHT: 62 IN | WEIGHT: 174.2 LBS | DIASTOLIC BLOOD PRESSURE: 84 MMHG

## 2017-10-30 DIAGNOSIS — R94.01 ABNORMAL ELECTROENCEPHALOGRAM (EEG): ICD-10-CM

## 2017-10-30 DIAGNOSIS — R90.89 ABNORMAL FINDING ON MRI OF BRAIN: ICD-10-CM

## 2017-10-30 DIAGNOSIS — R43.1 HYPEROSMIA: ICD-10-CM

## 2017-10-30 DIAGNOSIS — G31.84 MILD COGNITIVE IMPAIRMENT: ICD-10-CM

## 2017-10-30 DIAGNOSIS — G43.009 MIGRAINE WITHOUT AURA AND WITHOUT STATUS MIGRAINOSUS, NOT INTRACTABLE: Primary | ICD-10-CM

## 2017-10-30 PROBLEM — D32.9 MENINGIOMA (HCC): Status: RESOLVED | Noted: 2017-06-30 | Resolved: 2017-10-30

## 2017-10-30 PROCEDURE — 77063 BREAST TOMOSYNTHESIS BI: CPT | Performed by: RADIOLOGY

## 2017-10-30 PROCEDURE — G0202 SCR MAMMO BI INCL CAD: HCPCS | Performed by: RADIOLOGY

## 2017-10-30 PROCEDURE — 99214 OFFICE O/P EST MOD 30 MIN: CPT | Performed by: PSYCHIATRY & NEUROLOGY

## 2017-10-30 PROCEDURE — 77067 SCR MAMMO BI INCL CAD: CPT | Performed by: RADIOLOGY

## 2017-10-30 RX ORDER — ZONISAMIDE 100 MG/1
CAPSULE ORAL
Qty: 90 CAPSULE | Refills: 5 | Status: SHIPPED | OUTPATIENT
Start: 2017-10-30 | End: 2018-01-25 | Stop reason: SDUPTHER

## 2017-10-30 NOTE — PROGRESS NOTES
"Subjective:     Patient ID: Lissette Rea is a 42 y.o. female.    History of Present Illness  The following portions of the patient's history were reviewed and updated as appropriate: allergies, current medications, past family history, past medical history, past social history, past surgical history and problem list.    Complex case: Abnormal MRI, migraine, olfactory hallucinations?      Seizure description: She will have clusters of spells where she will \"smell smoke\".  Spells can last seconds up to 5 minutes.  She can have several in a day, or cluster of several days in a row and then nothing or no spells whatsoever for up to a month.  They're not associated with headache but the more intense spells cause nausea.  The spells can build slowly or can come on suddenly with nausea.  Some spells last up to 15 minutes.  She does not have staring following tongue biting or other usual manifestations of seizures.  They were not associated with a head injury or encephalitis.  She denies any loss of sense of smell.  These have not responded to Trileptal with a blood level XXXV Zonegran or Topamax and are not felt to be seizures.     EEG review: Dr. Lester had 2 EEGs done in 2015-16 showing left frontotemporal spikes.  An angled for EEG through [facility] showed polymorphic delta intermittently, left temporal.    Brain MRI reviewed from last year.  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  There is no compression of the parenchyma.  This lesion is unrelated to her symptoms noted above    Migraine: Has a couple of headaches per week.  They are markedly improved on Zonegran.  Imitrex has not helped.  She uses Tylenol 2-8 and a day on migraine days and I recommended a trial of naproxen.  I also recommended in the future considering verapamil.  She can't take beta blockers because of her pulmonary condition    [facility]- abnormal testing reviewed from neuropsychology.  She believes these problems began " after treatment with Topamax!     Medications for epilepsy: When seen by Dr. PHILLIPS last November she was already on zonisamide instead of Topamax and was no longer taking Elavil.  Ox carb was added at 300 twice a day.  There was no change in her symptoms.  Last April Vimpat was added at 50 twice a day.  There was still no change in her symptoms.  Now she is on Trileptal 300 twice a day and Zonegran 200 at night  Review of Systems   Constitutional: Negative for activity change, appetite change and fatigue.   HENT: Negative for ear pain, facial swelling and trouble swallowing.    Eyes: Negative for photophobia, itching and visual disturbance.   Respiratory: Negative for choking, chest tightness and shortness of breath.    Cardiovascular: Negative for chest pain, palpitations and leg swelling.   Gastrointestinal: Negative for abdominal pain, constipation and nausea.   Endocrine: Negative for polydipsia, polyphagia and polyuria.   Genitourinary: Negative for difficulty urinating, frequency and urgency.   Musculoskeletal: Negative for back pain, gait problem and neck pain.   Skin: Negative for color change, rash and wound.   Allergic/Immunologic: Negative for environmental allergies, food allergies and immunocompromised state.   Neurological: Positive for seizures (Last seizure with in the last 2 weeks). Negative for dizziness, tremors, syncope, facial asymmetry, speech difficulty, weakness, light-headedness, numbness and headaches.   Hematological: Negative for adenopathy. Does not bruise/bleed easily.   Psychiatric/Behavioral: Negative for agitation, behavioral problems, confusion, decreased concentration, dysphoric mood, hallucinations, self-injury, sleep disturbance and suicidal ideas. The patient is not nervous/anxious and is not hyperactive.         Objective:    Neurologic Exam     Mental Status   Oriented to person, place, and time.   Attention: normal. Concentration: normal.   Speech: speech is normal   Level of  consciousness: alert  Knowledge: good.   Able to name object. Able to read. Able to repeat. Able to write. Normal comprehension.     Cranial Nerves   Cranial nerves II through XII intact.     Motor Exam   Muscle bulk: normal  Overall muscle tone: normal    Sensory Exam   Light touch normal.   Vibration normal.     Gait, Coordination, and Reflexes     Gait  Gait: normal    Reflexes   Right brachioradialis: 1+  Left brachioradialis: 1+  Right biceps: 1+  Left biceps: 1+  Right triceps: 1+  Left triceps: 1+      Physical Exam   Constitutional: She is oriented to person, place, and time. She appears well-developed and well-nourished.   Neck: Normal range of motion. Neck supple.   Cardiovascular: Normal rate.    Neurological: She is oriented to person, place, and time. Gait normal.   Reflex Scores:       Tricep reflexes are 1+ on the right side and 1+ on the left side.       Bicep reflexes are 1+ on the right side and 1+ on the left side.       Brachioradialis reflexes are 1+ on the right side and 1+ on the left side.  Psychiatric: She has a normal mood and affect. Her speech is normal and behavior is normal. Judgment and thought content normal.   Nursing note and vitals reviewed.      Assessment/Plan:       Problems Addressed this Visit        Unprioritized    Mild cognitive impairment    Migraine without aura and without status migrainosus, not intractable - Primary    Relevant Medications    zonisamide (ZONEGRAN) 100 MG capsule    Abnormal electroencephalogram (EEG)    Abnormal finding on MRI of brain           Very complex situation.  We addressed the following period:  The abnormality on MRI is a small calcified hyperostosis versus a small calcified meningioma and does not need another x-ray for a couple of years and is not causative of any of her symptoms    The abnormal EEG probably is related to her mild cognitive impairment.  The cause for this is not clear.  It is stable.    Migraines are under fair but not  perfect control.  She will increase the Zonegran.  New dose will be 300 alternating with 200 every other day.  Renal stone risk was reviewed.  She'll push fluids.  The prior blood level was about 4.6 and needs to be higher    Seizure seems to be ruled out by lack of improvement of her olfactory symptoms with appropriate medicine therefore we are going to begin to taper Trileptal and less it helps her migraine.  Current dose is 300 mg twice a day.  She will reduced to a new dose of 450 daily for 3 weeks, 300 daily for 3 weeks, 150 daily for 3 weeks, then off.    She will call me in the future to determine how her migraines are doing with a higher dose of Zonegran.  We could consider verapamil as she is mildly hypertensive and she may discuss this with her PCP.    She is also asked to read about the use of riboflavin and magnesium citrate to help with migraine control.    Her hyper Oz me a symptoms are not related to any infection or illness and I don't think they are related to seizures either

## 2017-11-06 RX ORDER — OXCARBAZEPINE 300 MG/1
1200 TABLET, FILM COATED ORAL 2 TIMES DAILY
Qty: 720 TABLET | Refills: 0 | Status: SHIPPED | OUTPATIENT
Start: 2017-11-06 | End: 2018-04-02

## 2017-11-28 DIAGNOSIS — F90.9 ATTENTION DEFICIT HYPERACTIVITY DISORDER (ADHD), UNSPECIFIED ADHD TYPE: ICD-10-CM

## 2017-12-01 ENCOUNTER — OFFICE VISIT (OUTPATIENT)
Dept: FAMILY MEDICINE CLINIC | Facility: CLINIC | Age: 42
End: 2017-12-01

## 2017-12-01 VITALS
OXYGEN SATURATION: 100 % | HEART RATE: 78 BPM | TEMPERATURE: 97.6 F | SYSTOLIC BLOOD PRESSURE: 120 MMHG | DIASTOLIC BLOOD PRESSURE: 72 MMHG | HEIGHT: 62 IN | BODY MASS INDEX: 32.02 KG/M2 | WEIGHT: 174 LBS | RESPIRATION RATE: 16 BRPM

## 2017-12-01 DIAGNOSIS — F98.8 ATTENTION DEFICIT DISORDER (ADD) WITHOUT HYPERACTIVITY: ICD-10-CM

## 2017-12-01 DIAGNOSIS — R10.13 EPIGASTRIC PAIN: Primary | ICD-10-CM

## 2017-12-01 DIAGNOSIS — R55 VASOVAGAL EPISODE: ICD-10-CM

## 2017-12-01 PROCEDURE — 90686 IIV4 VACC NO PRSV 0.5 ML IM: CPT | Performed by: PHYSICIAN ASSISTANT

## 2017-12-01 PROCEDURE — 99213 OFFICE O/P EST LOW 20 MIN: CPT | Performed by: PHYSICIAN ASSISTANT

## 2017-12-01 PROCEDURE — 90471 IMMUNIZATION ADMIN: CPT | Performed by: PHYSICIAN ASSISTANT

## 2017-12-01 RX ORDER — AMLODIPINE BESYLATE 10 MG/1
TABLET ORAL
Refills: 1 | COMMUNITY
Start: 2017-10-31 | End: 2018-08-20

## 2017-12-01 RX ORDER — MONTELUKAST SODIUM 10 MG/1
TABLET ORAL
Refills: 3 | COMMUNITY
Start: 2017-10-31 | End: 2018-08-07 | Stop reason: SDUPTHER

## 2017-12-01 NOTE — PATIENT INSTRUCTIONS
Vasovagal Syncope, Adult  Syncope, which is commonly known as fainting or passing out, is a temporary loss of consciousness. It occurs when the blood flow to the brain is reduced. Vasovagal syncope, also called neurocardiogenic syncope, is a fainting spell that happens when blood flow to the brain is reduced because of a sudden drop in heart rate and blood pressure.  Vasovagal syncope is usually harmless. However, you can get injured if you fall during a fainting spell.  CAUSES  This condition is caused by a drop in heart rate and blood pressure, usually in response to a trigger. Many things and situations can trigger an episode, including:  · Pain.  · Fear.  · The sight of blood. This may occur during medical procedures, such as when blood is being drawn from a vein.  · Common activities, such as coughing, swallowing, stretching, or going to the bathroom.  · Emotional stress.  · Being in a confined space.  · Prolonged standing, especially in a warm environment.  · Lack of sleep or rest.  · Not eating for a long time.  · Not drinking enough liquids.  · Recent illness.  · Drinking alcohol.  · Taking drugs that affect blood pressure, such as marijuana, cocaine, opiates, or inhalants.  SYMPTOMS  Before a fainting episode, you may:  · Feel dizzy or light-headed.  · Become pale.  · Sense that you are going to faint.  · Feel like the room is spinning.  · Only see directly ahead (tunnel vision).  · Feel sick to your stomach (nauseous).  · See spots.  · Slowly lose vision.  · Hear ringing in your ears.  · Have a headache.  · Feel warm and sweaty.  · Feel a sensation of pins and needles.  During the fainting spell, you may twitch or make jerky movements. Fainting spells usually last no longer than a few minutes before you wake up. If you get up too quickly before your body can recover, you may faint again.  DIAGNOSIS  This condition is diagnosed based on your symptoms, your medical history, and a physical exam. Tests may be  done to rule out other causes of fainting. Tests may include:  · Blood tests.  · Heart tests, such as an electrocardiogram (ECG), echocardiogram, or electrophysiology study.  · A test to check your response to changes in position (tilt table test).  TREATMENT  Usually, treatment is not needed for this condition. Your health care provider may suggest ways to help prevent fainting episodes. These may include:  · Drinking additional fluids if you are exposed to a trigger.  · Sitting or lying down if you notice signs that an episode is coming.  If your fainting spells continue, your health care provider may recommend that you:  · Take medicines to prevent fainting or to help reduce further episodes of fainting.  · Do certain exercises.  · Wear compression stockings.  · Have surgery to place a pacemaker in your body (rare).  HOME CARE INSTRUCTIONS  · Learn to identify the signs that an episode is coming.  · Sit or lie down at the first sign of a fainting spell. If you sit down, put your head down between your legs. If you lie down, swing your legs up in the air to increase blood flow to the brain.  · Avoid hot tubs and saunas.  · Avoid standing for a long time. If you have to stand for a long time, try:    Crossing your legs.    Flexing and stretching your leg muscles.    Squatting.    Moving your legs.    Bending over.  · Drink enough fluid to keep your urine clear or pale yellow.  · Make changes to your diet that your health care provider recommends. You may be told to:    Avoid caffeine.    Eat more salt.  · Take over-the-counter and prescription medicines only as told by your health care provider.  SEEK MEDICAL CARE IF:  · You continue to have fainting spells despite treatment.  · You faint more often despite treatment.  · You lose consciousness for more than a few minutes.  · You faint during or after exercising or after being startled.  · You have twitching or jerky movements for longer than a few seconds during a  fainting spell.  · You have an episode of twitching or jerky movements without fainting.  SEEK IMMEDIATE MEDICAL CARE IF:  · A fainting spell leads to an injury or bleeding.  · You have new symptoms that occur with the fainting spells, such as:    Shortness of breath.    Chest pain.    Irregular heartbeat.  · You twitch or make jerky movements for more than 5 minutes.  · You twitch or make jerky movements during more than one fainting spell.     This information is not intended to replace advice given to you by your health care provider. Make sure you discuss any questions you have with your health care provider.     Document Released: 12/04/2013 Document Revised: 04/10/2017 Document Reviewed: 10/15/2016  [employer] Patient Education ©2017 [employer]

## 2017-12-01 NOTE — PROGRESS NOTES
Subjective   Lissette Rea is a 42 y.o. female.     History of Present Illness   Lissette Rea 42 y.o. female who presents for evaluation of abdominal pain and tightness, fullness, sharp epigastric pain. Symptoms have been present for 10 months .  The condition is aggravated by nothing . she is experiencing some nausea.  Alleviating factors are nothing with great results and controlling symptoms . Patient denies fever, diarrhea, constipation, GI symptoms waking them up, melena, bright red blood in stool, hematuria, reflux, dysphagia and pain referring to the back her past medical history is notable for GERD and IBS.  Patient denies recent travel.    I will refer to GI  Happened two times in last 3mos  I suspect her pain is causing hyperventilation and vasovagal response with her report light headed, sweating, weakness in legs, sensation of passing out, confusion.  She had just had lunch one hour prior--had fish.  Not dizzy to stand.  No irreg heart beats or skips.    Has no gallbladder   Lissette Rea 42 y.o. female who presents for ADD/ADHD follow up from dose increase last visit. She is noting Vyvanse is more effective than Adderall.  She is to change to Dr. Wesley for f/u on epilepsy  No new problems with insomnia, tachycardia,or weight loss. The patient has read and signed the [facility] Controlled Substance Contract. I will continue to see patient for regular follow up appointments and give the lowest effective dose. They are well controlled on their medication at the lowest effective dose. She has a previous history of a diagnosis of ADD/ADHD. Dx 2015 [place] has been reviewed by me and is updated every 3 months. She is on Vimpat now. The patient is aware of the potential for addiction and dependence. The Rx continues to help them concentrate, finish tasks in timely manor, and succeed. She denies current suicidal and homicidal ideation.  Reviewed causes for potential of discontinuation  of stimulant medication, including but not limited to consideration for diversion, obtaining other controlled substances from other license practitioners, or inappropriate office behavior. Patient understands to use a controlled substance as prescribed by physician and to avoid improper use of controlled substances. Patient will also verbalized understanding that prescriptions to be filled at the same pharmacy unless office staff is made aware of this. Patient understands they can be submitted to random urine drug screens and/or random pill counts on any request. Patient understands they are not to receive early refills. The patient must produce an official police report for any effort to replace controlled substances that are lost or stolen. No use of illegal street drugs while receiving controlled substances from this prescribing provider. The patient is to take medication as prescribed and not deviate from the normal schedule without consultation from the provider. Patient is not to share the medication with others or to take medication with alcohol or other sedatives. Use caution when driving or operating machinery. Must always keep the prescription in the original container. Patient is to store controlled substances in a locked cabinet or other secure storage unit that is cool, dry and out of sunlight. Patient must immediately notify office if this controlled substances is improperly taken by another individual. Reviewed risk for physical dependence, tolerance and addiction.   There is no evidence of aberrant behavior or any red flags. .       No family or personal history of addiction.  [facility] [employer] 7-1-15  The following portions of the patient's history were reviewed and updated as appropriate: allergies, current medications, past family history, past medical history, past social history, past surgical history and problem list.    Review of Systems   Constitutional: Negative for activity  change, appetite change and unexpected weight change.   HENT: Negative for nosebleeds and trouble swallowing.    Eyes: Negative for pain and visual disturbance.   Respiratory: Negative for chest tightness, shortness of breath and wheezing.    Cardiovascular: Negative for chest pain and palpitations.   Gastrointestinal: Positive for abdominal pain. Negative for blood in stool.   Endocrine: Negative.    Genitourinary: Negative for difficulty urinating and hematuria.   Musculoskeletal: Negative for joint swelling.   Skin: Negative for color change and rash.   Allergic/Immunologic: Negative.    Neurological: Positive for light-headedness. Negative for syncope and speech difficulty.   Hematological: Negative for adenopathy.   Psychiatric/Behavioral: Negative for agitation and confusion.   All other systems reviewed and are negative.      Objective   Physical Exam   Constitutional: She is oriented to person, place, and time. She appears well-developed and well-nourished. No distress.   HENT:   Head: Normocephalic and atraumatic.   Eyes: Conjunctivae and EOM are normal. Pupils are equal, round, and reactive to light. Right eye exhibits no discharge. Left eye exhibits no discharge. No scleral icterus.   Neck: Normal range of motion. Neck supple. No tracheal deviation present. No thyromegaly present.   Cardiovascular: Normal rate, regular rhythm, normal heart sounds, intact distal pulses and normal pulses.  Exam reveals no gallop.    No murmur heard.  Pulmonary/Chest: Effort normal and breath sounds normal. No respiratory distress. She has no wheezes. She has no rales.   Abdominal: Soft. Bowel sounds are normal. She exhibits no distension and no mass. There is no tenderness. There is no rebound and no guarding. No hernia.   Musculoskeletal: Normal range of motion.   Neurological: She is alert and oriented to person, place, and time. She exhibits normal muscle tone. Coordination normal.   Skin: Skin is warm. No rash noted. No  erythema. No pallor.   Psychiatric: She has a normal mood and affect. Her behavior is normal. Judgment and thought content normal.   Nursing note and vitals reviewed.      Assessment/Plan   Lissette was seen today for dizziness.    Diagnoses and all orders for this visit:    Epigastric pain    Vasovagal episode    Attention deficit disorder (ADD) without hyperactivity    Other orders  -     Flu Vaccine Quad PF 3YR+

## 2018-01-18 DIAGNOSIS — F90.9 ATTENTION DEFICIT HYPERACTIVITY DISORDER (ADHD), UNSPECIFIED ADHD TYPE: ICD-10-CM

## 2018-01-25 RX ORDER — ZONISAMIDE 100 MG/1
CAPSULE ORAL
Qty: 270 CAPSULE | Refills: 2 | Status: SHIPPED | OUTPATIENT
Start: 2018-01-25 | End: 2018-04-02 | Stop reason: DRUGHIGH

## 2018-03-02 RX ORDER — ALBUTEROL SULFATE 90 UG/1
2 AEROSOL, METERED RESPIRATORY (INHALATION) EVERY 4 HOURS PRN
Qty: 1 INHALER | Refills: 11 | Status: SHIPPED | OUTPATIENT
Start: 2018-03-02 | End: 2019-07-22 | Stop reason: SDUPTHER

## 2018-04-02 ENCOUNTER — OFFICE VISIT (OUTPATIENT)
Dept: NEUROLOGY | Facility: CLINIC | Age: 43
End: 2018-04-02

## 2018-04-02 VITALS
HEIGHT: 62 IN | BODY MASS INDEX: 34.04 KG/M2 | SYSTOLIC BLOOD PRESSURE: 124 MMHG | WEIGHT: 185 LBS | DIASTOLIC BLOOD PRESSURE: 74 MMHG

## 2018-04-02 DIAGNOSIS — F98.8 ATTENTION DEFICIT DISORDER (ADD) WITHOUT HYPERACTIVITY: ICD-10-CM

## 2018-04-02 DIAGNOSIS — G43.009 MIGRAINE WITHOUT AURA AND WITHOUT STATUS MIGRAINOSUS, NOT INTRACTABLE: ICD-10-CM

## 2018-04-02 DIAGNOSIS — R43.9 DYSOSMIA: ICD-10-CM

## 2018-04-02 DIAGNOSIS — R90.89 ABNORMAL FINDING ON MRI OF BRAIN: Primary | ICD-10-CM

## 2018-04-02 PROCEDURE — 99214 OFFICE O/P EST MOD 30 MIN: CPT | Performed by: PSYCHIATRY & NEUROLOGY

## 2018-04-02 RX ORDER — ZONISAMIDE 25 MG/1
CAPSULE ORAL
Qty: 180 CAPSULE | Refills: 3 | Status: SHIPPED | OUTPATIENT
Start: 2018-04-02 | End: 2018-11-16

## 2018-04-02 NOTE — PROGRESS NOTES
"Subjective:     Patient ID: Lissette Rea is a 42 y.o. female.    Migraine    Pertinent negatives include no abdominal pain, back pain, dizziness, eye pain, hearing loss, nausea, neck pain, numbness, photophobia, seizures, vomiting or weakness.     The following portions of the patient's history were reviewed and updated as appropriate: allergies, current medications, past family history, past medical history, past social history, past surgical history and problem list.   Dysosmia- is not epilepsy.  As a sensation of smelling smoke.  He does not lead to seizures.  It does not improve with use of antiseizure medicines.  The cause is unclear but it is not getting worse.Seizure seems to be ruled out by lack of improvement of her olfactory symptoms with appropriate medicine rx of oxcarb etc     The abnormality on MRI is a small calcified hyperostosis versus a small calcified meningioma and does not need another x-ray for a couple of years and is not causative of any of her symptoms     The abnormal EEG probably is related to her mild cognitive impairment.  The cause for this is not clear, he has ADD and she was on medication for epilepsy at the time of the EEG study     Migraines are under fair but not perfect control.   Zonegran:     200 every  day.  Renal stone risk was reviewed.  She'll push fluids.  The prior blood level was about 4.6.  Migraine-resolves with Aleve. Frequency  < 1 mo. lower dose of zonisamide may be efficacious and I will give her the 25 mg capsules and let her taper downward slowly     Driving!!!-no problems   [no epilepsy dx]    [facility]CI- OK with   [adderal switcedh to ]    vyvanse.    The - abnormal testing reviewed from neuropsychology, Nov 2015, may have been abnormal d/t meds and \"epilepsy dx '  . IQ scores , verbal and Perceptia and FS, about 80     . Review of Systems   Constitutional: Negative for activity change, appetite change and fatigue.   HENT: Negative for facial swelling, hearing " loss and trouble swallowing.    Eyes: Negative for photophobia, pain and visual disturbance.   Respiratory: Negative for choking, chest tightness and shortness of breath.    Cardiovascular: Negative for chest pain, palpitations and leg swelling.   Gastrointestinal: Negative for abdominal pain, nausea and vomiting.   Endocrine: Negative for polydipsia.   Musculoskeletal: Negative for back pain, gait problem and neck pain.   Neurological: Negative for dizziness, tremors, seizures, syncope, facial asymmetry, speech difficulty, weakness, light-headedness, numbness and headaches.   Hematological: Negative for adenopathy.   Psychiatric/Behavioral: Negative for agitation, behavioral problems, confusion, decreased concentration, dysphoric mood, hallucinations, self-injury, sleep disturbance and suicidal ideas. The patient is not nervous/anxious and is not hyperactive.         Objective:    Neurologic Exam     Mental Status   Oriented to person, place, and time.   Follows 3 step commands.   Attention: normal. Concentration: normal.   Speech: speech is normal   Level of consciousness: alert  Knowledge: good.   Able to name object. Able to read. Able to repeat.     Cranial Nerves     CN II   Visual fields full to confrontation.     CN III, IV, VI   Pupils are equal, round, and reactive to light.  Extraocular motions are normal.     CN VII   Facial expression full, symmetric.     CN VIII   CN VIII normal.     CN XI   CN XI normal.     CN XII   CN XII normal.   Fundi wnl     Motor Exam   Muscle bulk: normal  Overall muscle tone: normal    Sensory Exam   Light touch normal.     Gait, Coordination, and Reflexes     Gait  Gait: normal    Tremor   Resting tremor: absent  Intention tremor: absent  Action tremor: absent      Physical Exam   Constitutional: She is oriented to person, place, and time. She appears well-developed.   Eyes: EOM are normal. Pupils are equal, round, and reactive to light.   Cardiovascular: Normal rate.     Pulmonary/Chest: Effort normal.   Neurological: She is oriented to person, place, and time. Gait normal.   Psychiatric: She has a normal mood and affect. Her speech is normal and behavior is normal. Judgment and thought content normal.   Nursing note and vitals reviewed.      Assessment/Plan:       Problems Addressed this Visit        Unprioritized    ADD (attention deficit disorder)    Migraine without aura and without status migrainosus, not intractable    Relevant Medications    zonisamide (ZONEGRAN) 25 MG capsule    Abnormal finding on MRI of brain - Primary    Dysosmia      Other Visit Diagnoses    None.      dysosmia without epilepsy: No treatment    Hyperostosis versus small meningioma on MRI: No treatment warranted    ADD currently on Vyvanse but may switch back to Adderall, doing fairly well, and may explain some of the abnormalities found on neuropsychology testing    Common migraine fairly well-controlled with zonisamide but a smaller dose may be helpful.  She will have the 25 mg capsules to help her taper from 200 mg per day down to lower doses and call me if this does not help.      Follow-up in 8 months

## 2018-05-07 RX ORDER — AMLODIPINE BESYLATE 10 MG/1
10 TABLET ORAL DAILY
Qty: 90 TABLET | Refills: 0 | Status: SHIPPED | OUTPATIENT
Start: 2018-05-07 | End: 2018-08-07 | Stop reason: SDUPTHER

## 2018-07-01 RX ORDER — LEVOTHYROXINE SODIUM 0.05 MG/1
TABLET ORAL
Qty: 90 TABLET | Refills: 3 | Status: SHIPPED | OUTPATIENT
Start: 2018-07-01 | End: 2018-08-20 | Stop reason: SDUPTHER

## 2018-07-01 RX ORDER — ESOMEPRAZOLE MAGNESIUM 40 MG/1
40 CAPSULE, DELAYED RELEASE ORAL
Qty: 30 CAPSULE | Refills: 1 | Status: SHIPPED | OUTPATIENT
Start: 2018-07-01 | End: 2018-08-20 | Stop reason: SDUPTHER

## 2018-08-07 RX ORDER — AMLODIPINE BESYLATE 10 MG/1
10 TABLET ORAL DAILY
Qty: 30 TABLET | Refills: 0 | Status: SHIPPED | OUTPATIENT
Start: 2018-08-07 | End: 2018-08-08 | Stop reason: SDUPTHER

## 2018-08-07 RX ORDER — MONTELUKAST SODIUM 10 MG/1
10 TABLET ORAL NIGHTLY
Qty: 90 TABLET | Refills: 3 | Status: SHIPPED | OUTPATIENT
Start: 2018-08-07 | End: 2019-07-22 | Stop reason: SDUPTHER

## 2018-08-08 RX ORDER — AMLODIPINE BESYLATE 10 MG/1
TABLET ORAL
Qty: 15 TABLET | Refills: 0 | Status: SHIPPED | OUTPATIENT
Start: 2018-08-08 | End: 2018-08-20 | Stop reason: SDUPTHER

## 2018-08-20 ENCOUNTER — OFFICE VISIT (OUTPATIENT)
Dept: FAMILY MEDICINE CLINIC | Facility: CLINIC | Age: 43
End: 2018-08-20

## 2018-08-20 VITALS
BODY MASS INDEX: 33.86 KG/M2 | RESPIRATION RATE: 16 BRPM | OXYGEN SATURATION: 100 % | HEIGHT: 62 IN | HEART RATE: 65 BPM | DIASTOLIC BLOOD PRESSURE: 70 MMHG | WEIGHT: 184 LBS | TEMPERATURE: 97.9 F | SYSTOLIC BLOOD PRESSURE: 120 MMHG

## 2018-08-20 DIAGNOSIS — Z00.00 LABORATORY EXAMINATION ORDERED AS PART OF A ROUTINE GENERAL MEDICAL EXAMINATION: ICD-10-CM

## 2018-08-20 DIAGNOSIS — J45.30 MILD PERSISTENT ASTHMA WITHOUT COMPLICATION: ICD-10-CM

## 2018-08-20 DIAGNOSIS — R79.89 ABNORMAL CBC: ICD-10-CM

## 2018-08-20 DIAGNOSIS — K21.00 GASTROESOPHAGEAL REFLUX DISEASE WITH ESOPHAGITIS: ICD-10-CM

## 2018-08-20 DIAGNOSIS — R79.89 LFT ELEVATION: ICD-10-CM

## 2018-08-20 DIAGNOSIS — I10 ESSENTIAL HYPERTENSION: ICD-10-CM

## 2018-08-20 DIAGNOSIS — T14.8XXA BRUISING: ICD-10-CM

## 2018-08-20 DIAGNOSIS — J30.2 CHRONIC SEASONAL ALLERGIC RHINITIS DUE TO OTHER ALLERGEN: ICD-10-CM

## 2018-08-20 DIAGNOSIS — J01.90 ACUTE SINUSITIS, RECURRENCE NOT SPECIFIED, UNSPECIFIED LOCATION: ICD-10-CM

## 2018-08-20 DIAGNOSIS — E03.9 ACQUIRED HYPOTHYROIDISM: Primary | ICD-10-CM

## 2018-08-20 PROCEDURE — 99214 OFFICE O/P EST MOD 30 MIN: CPT | Performed by: PHYSICIAN ASSISTANT

## 2018-08-20 RX ORDER — AMOXICILLIN AND CLAVULANATE POTASSIUM 875; 125 MG/1; MG/1
1 TABLET, FILM COATED ORAL EVERY 12 HOURS SCHEDULED
Qty: 20 TABLET | Refills: 1 | Status: SHIPPED | OUTPATIENT
Start: 2018-08-20 | End: 2018-09-06

## 2018-08-20 RX ORDER — ESOMEPRAZOLE MAGNESIUM 40 MG/1
40 CAPSULE, DELAYED RELEASE ORAL
Qty: 90 CAPSULE | Refills: 3 | Status: SHIPPED | OUTPATIENT
Start: 2018-08-20 | End: 2019-07-22 | Stop reason: SDUPTHER

## 2018-08-20 RX ORDER — AMLODIPINE BESYLATE 10 MG/1
10 TABLET ORAL DAILY
Qty: 90 TABLET | Refills: 1 | Status: SHIPPED | OUTPATIENT
Start: 2018-08-20 | End: 2019-04-12 | Stop reason: SDUPTHER

## 2018-08-20 RX ORDER — LEVOTHYROXINE SODIUM 0.05 MG/1
50 TABLET ORAL DAILY
Qty: 90 TABLET | Refills: 3 | Status: SHIPPED | OUTPATIENT
Start: 2018-08-20 | End: 2019-07-22 | Stop reason: SDUPTHER

## 2018-08-20 RX ORDER — METHYLPREDNISOLONE 4 MG/1
TABLET ORAL
Qty: 21 TABLET | Refills: 1 | Status: SHIPPED | OUTPATIENT
Start: 2018-08-20 | End: 2018-09-06

## 2018-08-20 NOTE — PATIENT INSTRUCTIONS
For throat pain:  Can gargle with 1/2 Maalox and 1/2 Benadryl liquid ---mix, gargle and spit Take Tylenol for fever or aches  Rest as needed  Call not better in 7 days  Increase fluids  Call if worse  Can use generic Afrin nasal spray up to 5 days for nasal congestion  Finish antibiotic course of therapy  Low glycemic index diet  Exercise 30 minutes most days of the week  Make sure you get results on any labs or tests we ordered today  We discussed medications and how to take them as prescribed  Sleep 6-8 hours each night if possible  If you have not signed up for [employer], please activate your code ASAP from your After Visit Summary today    LDL goal <100  LDL goal if heart disease <70  HDL goal >60  Triglyceride goal <150  BP goal =<130/80  Fasting glucose <100

## 2018-08-20 NOTE — PROGRESS NOTES
Subjective   Lissette Rea is a 43 y.o. female.     History of Present Illness   Lissette Rea 43 y.o. female who presents today for routine follow up check and medication refills.  she has a history of   Patient Active Problem List   Diagnosis   • ADD (attention deficit disorder)   • Essential hypertension   • Mild persistent asthma without complication   • Migraine without aura and without status migrainosus, not intractable   • Impaired visual perception   • Gastroesophageal reflux disease with esophagitis   • Abnormal electroencephalogram (EEG)   • Lumbar radiculopathy   • Degeneration of intervertebral disc of lumbar region   • Spinal stenosis of lumbar region   • Herniated lumbar intervertebral disc   • Class 1 obesity   • Postoperative state   • Moderate persistent asthma with acute exacerbation   • Seasonal allergic rhinitis   • Ankylosis of lumbar spine   • Status post lumbar spine operation   • Acquired hypothyroidism   • Abnormal finding on MRI of brain   • Hyperosmia   • Dysosmia   .  Since the last visit, she has overall felt tired.  She has Hypertenision and is well controlled on medication, GERD and is well controlled on PPI medication, Hyperlipidemia and working on this with diet and exercise, Hypothyroidism and will need to update labs for continued treatment and Vitamin D deficiency and will update labs to confirm level is at goal >30.  she has been compliant with current medications have reviewed them.  The patient denies medication side effects.    Results for orders placed or performed in visit on 08/03/17   T4, Free   Result Value Ref Range    Free T4 0.92 0.82 - 1.77 ng/dL   TSH   Result Value Ref Range    TSH 0.515 0.450 - 4.500 uIU/mL     Zantac did not help GERD  Sees pulm for asthma  Saw DR Wesley to f/u brain MRI and migraines  She stopped Vyvanse and was on weight loss Rx recently and now off; may consider going to Adderall---make appt for this    She is bruising more on legs and  abd; will get iron profile and PT/PTT    Lissette Rea 43 y.o. female who presents for evaluation of upper respiratory congestion, cough. Symptoms include ear pressure, congestion, post nasal drip, fatigue, runny nose and wheezing.  Onset of symptoms was 1 week ago, gradually worsening since that time. Patient denies fever.   Evaluation to date: none Treatment to date:  usual meds; no rescue albuterol yet.  Will give her Medrol sima ready..       The following portions of the patient's history were reviewed and updated as appropriate: allergies, current medications, past family history, past medical history, past social history, past surgical history and problem list.    Review of Systems   Constitutional: Positive for fatigue. Negative for activity change and unexpected weight change.   HENT: Positive for congestion, postnasal drip, rhinorrhea, sinus pain and sore throat. Negative for ear pain.    Eyes: Negative for pain and discharge.   Respiratory: Positive for cough and wheezing. Negative for chest tightness and shortness of breath.    Cardiovascular: Negative for chest pain and palpitations.   Gastrointestinal: Negative for abdominal pain, diarrhea and vomiting.   Endocrine: Negative.    Genitourinary: Negative.    Musculoskeletal: Negative for joint swelling.   Skin: Negative for color change, rash and wound.   Allergic/Immunologic: Negative.    Neurological: Negative for seizures and syncope.   Psychiatric/Behavioral: Negative.        Objective   Physical Exam   Constitutional: She is oriented to person, place, and time. She appears well-developed and well-nourished.  Non-toxic appearance. No distress.   HENT:   Head: Normocephalic and atraumatic. Hair is normal.   Right Ear: External ear normal. No drainage, swelling or tenderness. Tympanic membrane is retracted.   Left Ear: External ear normal. No drainage, swelling or tenderness. Tympanic membrane is retracted.   Nose: Mucosal edema present. No  epistaxis.   Mouth/Throat: Uvula is midline and mucous membranes are normal. No oral lesions. No uvula swelling. Posterior oropharyngeal erythema present. No oropharyngeal exudate.   Eyes: Pupils are equal, round, and reactive to light. Conjunctivae and EOM are normal. Right eye exhibits no discharge. Left eye exhibits no discharge. No scleral icterus.   Neck: Normal range of motion. Neck supple.   Cardiovascular: Normal rate, regular rhythm, normal heart sounds and intact distal pulses.  Exam reveals no gallop.    No murmur heard.  Pulmonary/Chest: Breath sounds normal. No stridor. No respiratory distress. She has no wheezes. She has no rales. She exhibits no tenderness.   Abdominal: Soft. There is no tenderness.   Lymphadenopathy:     She has cervical adenopathy.   Neurological: She is alert and oriented to person, place, and time. She exhibits normal muscle tone.   Skin: Skin is warm and dry. No rash noted. She is not diaphoretic.   Psychiatric: She has a normal mood and affect. Her behavior is normal. Judgment and thought content normal.   Nursing note and vitals reviewed.      Assessment/Plan   Problems Addressed this Visit        Cardiovascular and Mediastinum    Essential hypertension    Relevant Medications    amLODIPine (NORVASC) 10 MG tablet    Other Relevant Orders    Comprehensive metabolic panel    Lipid panel    CBC and Differential    TSH    T4, Free    T3, Free    Vitamin B12    Folate    Vitamin D 25 Hydroxy    Protime-INR    APTT    Iron Profile       Respiratory    Mild persistent asthma without complication    Relevant Orders    Comprehensive metabolic panel    Lipid panel    CBC and Differential    TSH    T4, Free    T3, Free    Vitamin B12    Folate    Vitamin D 25 Hydroxy    Protime-INR    APTT    Iron Profile    Seasonal allergic rhinitis    Relevant Orders    Comprehensive metabolic panel    Lipid panel    CBC and Differential    TSH    T4, Free    T3, Free    Vitamin B12    Folate     Vitamin D 25 Hydroxy    Protime-INR    APTT    Iron Profile       Digestive    Gastroesophageal reflux disease with esophagitis    Relevant Medications    esomeprazole (nexIUM) 40 MG capsule    Other Relevant Orders    Comprehensive metabolic panel    Lipid panel    CBC and Differential    TSH    T4, Free    T3, Free    Vitamin B12    Folate    Vitamin D 25 Hydroxy    Protime-INR    APTT    Iron Profile       Endocrine    Acquired hypothyroidism - Primary    Relevant Medications    levothyroxine (SYNTHROID, LEVOTHROID) 50 MCG tablet    MethylPREDNISolone (MEDROL, WAQAR,) 4 MG tablet    Other Relevant Orders    Comprehensive metabolic panel    Lipid panel    CBC and Differential    TSH    T4, Free    T3, Free    Vitamin B12    Folate    Vitamin D 25 Hydroxy    Protime-INR    APTT    Iron Profile      Other Visit Diagnoses     Acute sinusitis, recurrence not specified, unspecified location        Relevant Orders    Comprehensive metabolic panel    Lipid panel    CBC and Differential    TSH    T4, Free    T3, Free    Vitamin B12    Folate    Vitamin D 25 Hydroxy    Protime-INR    APTT    Iron Profile    Laboratory examination ordered as part of a routine general medical examination        Relevant Orders    Comprehensive metabolic panel    Lipid panel    CBC and Differential    TSH    T4, Free    T3, Free    Vitamin B12    Folate    Vitamin D 25 Hydroxy    Protime-INR    APTT    Iron Profile    Bruising        Relevant Orders    Comprehensive metabolic panel    Lipid panel    CBC and Differential    TSH    T4, Free    T3, Free    Vitamin B12    Folate    Vitamin D 25 Hydroxy    Protime-INR    APTT    Iron Profile

## 2018-08-21 LAB
25(OH)D3+25(OH)D2 SERPL-MCNC: 32.9 NG/ML (ref 30–100)
ALBUMIN SERPL-MCNC: 4.5 G/DL (ref 3.5–5.2)
ALBUMIN/GLOB SERPL: 1.8 G/DL
ALP SERPL-CCNC: 76 U/L (ref 39–117)
ALT SERPL-CCNC: 40 U/L (ref 1–33)
APTT PPP: 29.2 SECONDS (ref 22.7–35.4)
AST SERPL-CCNC: 16 U/L (ref 1–32)
BASOPHILS # BLD AUTO: 0.06 10*3/MM3 (ref 0–0.2)
BASOPHILS NFR BLD AUTO: 1 % (ref 0–1.5)
BILIRUB SERPL-MCNC: 0.2 MG/DL (ref 0.1–1.2)
BUN SERPL-MCNC: 13 MG/DL (ref 6–20)
BUN/CREAT SERPL: 17.6 (ref 7–25)
CALCIUM SERPL-MCNC: 8.6 MG/DL (ref 8.6–10.5)
CHLORIDE SERPL-SCNC: 104 MMOL/L (ref 98–107)
CHOLEST SERPL-MCNC: 200 MG/DL (ref 0–200)
CO2 SERPL-SCNC: 26.7 MMOL/L (ref 22–29)
CREAT SERPL-MCNC: 0.74 MG/DL (ref 0.57–1)
EOSINOPHIL # BLD AUTO: 0.25 10*3/MM3 (ref 0–0.7)
EOSINOPHIL NFR BLD AUTO: 4.1 % (ref 0.3–6.2)
ERYTHROCYTE [DISTWIDTH] IN BLOOD BY AUTOMATED COUNT: 13.5 % (ref 11.7–13)
FOLATE SERPL-MCNC: 10.31 NG/ML (ref 4.78–24.2)
GLOBULIN SER CALC-MCNC: 2.5 GM/DL
GLUCOSE SERPL-MCNC: 89 MG/DL (ref 65–99)
HCT VFR BLD AUTO: 43.7 % (ref 35.6–45.5)
HDLC SERPL-MCNC: 75 MG/DL (ref 40–60)
HGB BLD-MCNC: 13 G/DL (ref 11.9–15.5)
IMM GRANULOCYTES # BLD: 0 10*3/MM3 (ref 0–0.03)
IMM GRANULOCYTES NFR BLD: 0 % (ref 0–0.5)
INR PPP: 1.03 (ref 0.9–1.1)
IRON SATN MFR SERPL: 13 % (ref 20–50)
IRON SERPL-MCNC: 52 MCG/DL (ref 37–145)
LDLC SERPL CALC-MCNC: 111 MG/DL (ref 0–100)
LYMPHOCYTES # BLD AUTO: 0.99 10*3/MM3 (ref 0.9–4.8)
LYMPHOCYTES NFR BLD AUTO: 16.2 % (ref 19.6–45.3)
MCH RBC QN AUTO: 30.1 PG (ref 26.9–32)
MCHC RBC AUTO-ENTMCNC: 29.7 G/DL (ref 32.4–36.3)
MCV RBC AUTO: 101.2 FL (ref 80.5–98.2)
MONOCYTES # BLD AUTO: 0.54 10*3/MM3 (ref 0.2–1.2)
MONOCYTES NFR BLD AUTO: 8.8 % (ref 5–12)
NEUTROPHILS # BLD AUTO: 4.28 10*3/MM3 (ref 1.9–8.1)
NEUTROPHILS NFR BLD AUTO: 69.9 % (ref 42.7–76)
PLATELET # BLD AUTO: 336 10*3/MM3 (ref 140–500)
POTASSIUM SERPL-SCNC: 4.7 MMOL/L (ref 3.5–5.2)
PROT SERPL-MCNC: 7 G/DL (ref 6–8.5)
PROTHROMBIN TIME: 13.3 SECONDS (ref 11.7–14.2)
RBC # BLD AUTO: 4.32 10*6/MM3 (ref 3.9–5.2)
SODIUM SERPL-SCNC: 143 MMOL/L (ref 136–145)
T3FREE SERPL-MCNC: 2.8 PG/ML (ref 2–4.4)
T4 FREE SERPL-MCNC: 1.18 NG/DL (ref 0.93–1.7)
TIBC SERPL-MCNC: 402 MCG/DL
TRIGL SERPL-MCNC: 68 MG/DL (ref 0–150)
TSH SERPL DL<=0.005 MIU/L-ACNC: 1.49 MIU/ML (ref 0.27–4.2)
UIBC SERPL-MCNC: 350 MCG/DL
VIT B12 SERPL-MCNC: 468 PG/ML (ref 211–946)
VLDLC SERPL CALC-MCNC: 13.6 MG/DL (ref 5–40)
WBC # BLD AUTO: 6.12 10*3/MM3 (ref 4.5–10.7)

## 2018-08-23 DIAGNOSIS — K58.9 IRRITABLE BOWEL SYNDROME, UNSPECIFIED TYPE: ICD-10-CM

## 2018-08-23 DIAGNOSIS — R79.89 LFT ELEVATION: Primary | ICD-10-CM

## 2018-08-24 LAB
HAV IGM SERPL QL IA: NEGATIVE
HBV CORE IGM SERPL QL IA: NEGATIVE
HBV SURFACE AG SERPL QL IA: NEGATIVE
HCV AB S/CO SERPL IA: 0.1 S/CO RATIO (ref 0–0.9)
Lab: NORMAL
WRITTEN AUTHORIZATION: NORMAL

## 2018-09-06 ENCOUNTER — OFFICE VISIT (OUTPATIENT)
Dept: FAMILY MEDICINE CLINIC | Facility: CLINIC | Age: 43
End: 2018-09-06

## 2018-09-06 VITALS
TEMPERATURE: 98.2 F | SYSTOLIC BLOOD PRESSURE: 120 MMHG | HEIGHT: 62 IN | BODY MASS INDEX: 34.6 KG/M2 | HEART RATE: 66 BPM | DIASTOLIC BLOOD PRESSURE: 72 MMHG | WEIGHT: 188 LBS | RESPIRATION RATE: 16 BRPM | OXYGEN SATURATION: 100 %

## 2018-09-06 DIAGNOSIS — D22.9 ATYPICAL NEVI: ICD-10-CM

## 2018-09-06 DIAGNOSIS — R21 RASH/SKIN ERUPTION: Primary | ICD-10-CM

## 2018-09-06 PROCEDURE — 99213 OFFICE O/P EST LOW 20 MIN: CPT | Performed by: PHYSICIAN ASSISTANT

## 2018-09-06 RX ORDER — METHYLPREDNISOLONE 4 MG/1
TABLET ORAL
Qty: 21 TABLET | Refills: 0 | Status: SHIPPED | OUTPATIENT
Start: 2018-09-06 | End: 2018-11-16

## 2018-09-06 NOTE — PROGRESS NOTES
Subjective   Lissette Rea is a 43 y.o. female.     History of Present Illness   Lissette Rea 43 y.o. female presents for evaluation of a rash involving the calf. Rash has been present for: 1 day. Lesions are red, and are described as macular. Rash has changed over time. Rash is painful and is pruritic. Associated symptoms  .none.  Patient denies:fever..  Treatment to date includes: none without improvement. Symptoms are gradually worsening. Patient has not had contacts with similar rash. Patient has not had new exposures (soaps, lotions, laundry detergents, foods, medications, plants, insects or animals).    I personally discussed this patient's care and medical decision making with Dr. Rubalcava.  We reviewed the patient history, exam findings, and plan.  He did approve this plan of care.    Will do cortisone cream and watch; Zyrtec for itching up to twice daily    Needs derm to see her left side face skin cancer back  The following portions of the patient's history were reviewed and updated as appropriate: allergies, current medications, past family history, past medical history, past social history, past surgical history and problem list.    Review of Systems   Constitutional: Negative for activity change, appetite change and unexpected weight change.   HENT: Negative for nosebleeds and trouble swallowing.    Eyes: Negative for pain and visual disturbance.   Respiratory: Negative for chest tightness, shortness of breath and wheezing.    Cardiovascular: Negative for chest pain and palpitations.   Gastrointestinal: Negative for abdominal pain and blood in stool.   Endocrine: Negative.    Genitourinary: Negative for difficulty urinating and hematuria.   Musculoskeletal: Negative for joint swelling.   Skin: Positive for rash. Negative for color change.   Allergic/Immunologic: Negative.    Neurological: Negative for syncope and speech difficulty.   Hematological: Negative for adenopathy.    Psychiatric/Behavioral: Negative for agitation and confusion.   All other systems reviewed and are negative.      Objective   Physical Exam   Constitutional: She is oriented to person, place, and time. She appears well-developed and well-nourished. No distress.   HENT:   Head: Normocephalic and atraumatic.   Eyes: Pupils are equal, round, and reactive to light. Conjunctivae and EOM are normal. Right eye exhibits no discharge. Left eye exhibits no discharge. No scleral icterus.   Neck: Normal range of motion. Neck supple. No tracheal deviation present. No thyromegaly present.   Cardiovascular: Normal rate, regular rhythm, normal heart sounds, intact distal pulses and normal pulses.  Exam reveals no gallop.    No murmur heard.  Pulmonary/Chest: Effort normal and breath sounds normal. No respiratory distress. She has no wheezes. She has no rales.   Musculoskeletal: Normal range of motion.   Neurological: She is alert and oriented to person, place, and time. She exhibits normal muscle tone. Coordination normal.   Skin: Skin is warm. Rash (diffuse red macular rash right calf; no papules or vesicles) noted. No erythema. No pallor.   Lesion left in front of ear--refer derm      Rash now on left calf   Psychiatric: She has a normal mood and affect. Her behavior is normal. Judgment and thought content normal.   Nursing note and vitals reviewed.      Assessment/Plan   Lissette was seen today for rash.    Diagnoses and all orders for this visit:    Rash/skin eruption

## 2018-09-06 NOTE — PATIENT INSTRUCTIONS
Call if area worsens such as increased streaking around this area, fever, increased pain  Wash with antibacterial soap  Apply moist heat to effected area for 10-20 minutes at least 4 times a day until healed  Call if not improving with this treatment after 48 hours

## 2018-09-19 ENCOUNTER — OFFICE VISIT (OUTPATIENT)
Dept: GASTROENTEROLOGY | Facility: CLINIC | Age: 43
End: 2018-09-19

## 2018-09-19 VITALS
TEMPERATURE: 98.6 F | HEIGHT: 62 IN | DIASTOLIC BLOOD PRESSURE: 72 MMHG | BODY MASS INDEX: 35.19 KG/M2 | SYSTOLIC BLOOD PRESSURE: 114 MMHG | WEIGHT: 191.2 LBS

## 2018-09-19 DIAGNOSIS — K58.0 IRRITABLE BOWEL SYNDROME WITH DIARRHEA: Primary | ICD-10-CM

## 2018-09-19 DIAGNOSIS — R74.01 ELEVATED ALT MEASUREMENT: ICD-10-CM

## 2018-09-19 PROCEDURE — 99204 OFFICE O/P NEW MOD 45 MIN: CPT | Performed by: INTERNAL MEDICINE

## 2018-09-19 NOTE — PATIENT INSTRUCTIONS
Daily fiber supplementation    Levsin as needed for cramping and pain    Review the FODMAPS diet    For any additional questions, concerns or changes to your condition after today's office visit please contact the office at [phone].

## 2018-09-19 NOTE — PROGRESS NOTES
"Chief Complaint   Patient presents with   • Abdominal Pain   • Nausea       Subjective     HPI    Lissette Rea is a 43 y.o. female with a past medical history noted below who presents for evaluation of abdominal pain, diarrhea.  Symptoms have been present for over 10 years with worsening over the past few months.  She was diagnosed with IBS at that time.  Symptoms are described as \"attacks.\"  She will develop acute lower abdominal pain. This will be associated with sweating and diarrhea.  This has been occurring multiple times per week. She does take some gas x but no anti-diarrheals.  She does take a daily stool softer to prevent constipation.  She has tried IB Lori and a probiotic with no effect.    She has had colonoscopies in the past and reports polyps-- done with [facility] but she doesn't remember who.    Brother with Crohn's, uncle with colon cancer.  No excess nsaids.  S/p dre and appy.  Works at a school.      Recent labs with her PCP showed a new elevation in her ALT.  This will be followed in 3 months.      Past Medical History:   Diagnosis Date   • ADHD (attention deficit hyperactivity disorder)    • Asthma    • Cholelithiasis 2000    Gallbladder removed   • Colon polyp 2010   • Disc herniation    • GERD (gastroesophageal reflux disease) 2005   • Headache    • Hypertension    • Irritable bowel syndrome 1999   • Lichen sclerosus    • Memory loss    • Migraine          Current Outpatient Prescriptions:   •  albuterol (PROAIR HFA) 108 (90 Base) MCG/ACT inhaler, Inhale 2 puffs Every 4 (Four) Hours As Needed for Wheezing., Disp: 1 inhaler, Rfl: 11  •  amLODIPine (NORVASC) 10 MG tablet, Take 1 tablet by mouth Daily. For BP, Disp: 90 tablet, Rfl: 1  •  BREO ELLIPTA 100-25 MCG/INH aerosol powder , INHALE 1 PUFF BY MOUTH ONCE DAILY, Disp: , Rfl: 5  •  esomeprazole (nexIUM) 40 MG capsule, Take 1 capsule by mouth Every Morning Before Breakfast. For GERD, Disp: 90 capsule, Rfl: 3  •  levothyroxine (SYNTHROID, " LEVOTHROID) 50 MCG tablet, Take 1 tablet by mouth Daily. For thyroid, Disp: 90 tablet, Rfl: 3  •  MethylPREDNISolone (MEDROL, WAQAR,) 4 MG tablet, follow package directions, Disp: 21 tablet, Rfl: 0  •  montelukast (SINGULAIR) 10 MG tablet, TAKE 1 TABLET BY MOUTH EVERY NIGHT FOR 90 DAYS. FOR ALLERGY AND ASTHMA, Disp: 90 tablet, Rfl: 3  •  Spacer/Aero-Holding Chambers device, Use U.D. With inhaler, Disp: 1 each, Rfl: 11  •  zonisamide (ZONEGRAN) 25 MG capsule, Take 2 daily (Patient taking differently: Take 25 mg by mouth 3 (Three) Times a Week. Take 2 daily), Disp: 180 capsule, Rfl: 3  •  hyoscyamine (LEVSIN) 0.125 MG SL tablet, Take 1 tablet by mouth Every 6 (Six) Hours As Needed for Cramping or Diarrhea., Disp: 60 tablet, Rfl: 1  •  methylcellulose, Laxative, (CITRUCEL) 500 MG tablet tablet, Take 2 tablets by mouth 2 (Two) Times a Day With Meals., Disp: 120 tablet, Rfl: 1    Allergies   Allergen Reactions   • Codeine Rash   • Hydromorphone Nausea And Vomiting   • Lisinopril Angioedema   • Latex Other (See Comments)     Has sensetivity to certain tapes including steri strips and op site  Blister with op site and redness and itching to steri strips       Social History     Social History   • Marital status:      Spouse name: N/A   • Number of children: N/A   • Years of education: N/A     Occupational History   • Not on file.     Social History Main Topics   • Smoking status: Never Smoker   • Smokeless tobacco: Never Used   • Alcohol use Yes     2 Glasses of wine0 Standard drinks or equivalent per week      Comment: Total glasses of wine are monthly   • Drug use: No   • Sexual activity: Yes     Partners: Male     Birth control/ protection: None     Other Topics Concern   • Not on file     Social History Narrative   • No narrative on file       Family History   Problem Relation Age of Onset   • Hypertension Mother    • Hypertension Father    • Irritable bowel syndrome Father    • Crohn's disease Brother    • Cancer  Maternal Uncle    • Celiac disease Maternal Uncle    • Emphysema Maternal Grandmother    • Dementia Maternal Grandfather    • Heart disease Paternal Grandmother    • Cancer Paternal Grandfather    • Colon cancer Maternal Uncle        Review of Systems   Constitutional: Negative for activity change, appetite change, fatigue and fever.   HENT: Negative for sore throat and trouble swallowing.    Respiratory: Negative.    Cardiovascular: Negative.    Gastrointestinal: Positive for abdominal pain, diarrhea and nausea. Negative for abdominal distention, blood in stool, constipation and vomiting.   Endocrine: Negative for cold intolerance and heat intolerance.   Genitourinary: Negative for difficulty urinating, dysuria, frequency and hematuria.   Musculoskeletal: Negative for arthralgias, back pain and myalgias.   Skin: Negative.    Neurological: Negative for headaches.   Hematological: Negative for adenopathy. Does not bruise/bleed easily.   All other systems reviewed and are negative.      Objective     Vitals:    09/19/18 1550   BP: 114/72   Temp: 98.6 °F (37 °C)     1    09/19/18  1550   Weight: 86.7 kg (191 lb 3.2 oz)     Body mass index is 34.97 kg/m².    Physical Exam   Constitutional: She is oriented to person, place, and time. She appears well-developed and well-nourished. No distress.   HENT:   Head: Normocephalic and atraumatic.   Right Ear: External ear normal.   Left Ear: External ear normal.   Nose: Nose normal.   Mouth/Throat: Oropharynx is clear and moist.   Eyes: Conjunctivae and EOM are normal. Right eye exhibits no discharge. Left eye exhibits no discharge. No scleral icterus.   Neck: Normal range of motion. Neck supple. No thyromegaly present.   No supraclavicular adenopathy   Cardiovascular: Normal rate, regular rhythm, normal heart sounds and intact distal pulses.  Exam reveals no gallop.    No murmur heard.  No lower extremity edema   Pulmonary/Chest: Effort normal and breath sounds normal. No  respiratory distress. She has no wheezes.   Abdominal: Soft. Normal appearance and bowel sounds are normal. She exhibits no distension and no mass. There is no hepatosplenomegaly. There is no tenderness. There is no rigidity, no rebound and no guarding. No hernia.   Genitourinary:   Genitourinary Comments: Rectal exam deferred   Musculoskeletal: Normal range of motion. She exhibits no edema or tenderness.   No atrophy of upper or lower extremities.  Normal digits and nails of both hands.   Lymphadenopathy:     She has no cervical adenopathy.   Neurological: She is alert and oriented to person, place, and time. She displays no atrophy. Coordination normal.   Skin: Skin is warm and dry. No rash noted. She is not diaphoretic. No erythema.   Psychiatric: She has a normal mood and affect. Her behavior is normal. Judgment and thought content normal.   Vitals reviewed.      WBC   Date Value Ref Range Status   04/21/2017 5.05 4.50 - 10.70 10*3/mm3 Final     RBC   Date Value Ref Range Status   08/20/2018 4.32 3.90 - 5.20 10*6/mm3 Final     Hemoglobin   Date Value Ref Range Status   08/20/2018 13.0 11.9 - 15.5 g/dL Final     Hematocrit   Date Value Ref Range Status   08/20/2018 43.7 35.6 - 45.5 % Final     MCV   Date Value Ref Range Status   08/20/2018 101.2 (H) 80.5 - 98.2 fL Final     MCH   Date Value Ref Range Status   08/20/2018 30.1 26.9 - 32.0 pg Final     MCHC   Date Value Ref Range Status   08/20/2018 29.7 (L) 32.4 - 36.3 g/dL Final     RDW   Date Value Ref Range Status   08/20/2018 13.5 (H) 11.7 - 13.0 % Final     Platelets   Date Value Ref Range Status   08/20/2018 336 140 - 500 10*3/mm3 Final     Neutrophil Rel %   Date Value Ref Range Status   08/20/2018 69.9 42.7 - 76.0 % Final     Lymphocyte Rel %   Date Value Ref Range Status   08/20/2018 16.2 (L) 19.6 - 45.3 % Final     Monocyte Rel %   Date Value Ref Range Status   08/20/2018 8.8 5.0 - 12.0 % Final     Eosinophil Rel %   Date Value Ref Range Status    08/20/2018 4.1 0.3 - 6.2 % Final     Basophil Rel %   Date Value Ref Range Status   08/20/2018 1.0 0.0 - 1.5 % Final     Neutrophils Absolute   Date Value Ref Range Status   08/20/2018 4.28 1.90 - 8.10 10*3/mm3 Final     Lymphocytes Absolute   Date Value Ref Range Status   08/20/2018 0.99 0.90 - 4.80 10*3/mm3 Final     Monocytes Absolute   Date Value Ref Range Status   08/20/2018 0.54 0.20 - 1.20 10*3/mm3 Final     Eosinophils Absolute   Date Value Ref Range Status   08/20/2018 0.25 0.00 - 0.70 10*3/mm3 Final     Basophils Absolute   Date Value Ref Range Status   08/20/2018 0.06 0.00 - 0.20 10*3/mm3 Final       Glucose   Date Value Ref Range Status   06/30/2017 100 (H) 65 - 99 mg/dL Final     Sodium   Date Value Ref Range Status   08/20/2018 143 136 - 145 mmol/L Final   06/30/2017 142 136 - 145 mmol/L Final     Potassium   Date Value Ref Range Status   08/20/2018 4.7 3.5 - 5.2 mmol/L Final   06/30/2017 3.9 3.5 - 5.2 mmol/L Final     CO2   Date Value Ref Range Status   06/30/2017 24.9 22.0 - 29.0 mmol/L Final     Total CO2   Date Value Ref Range Status   08/20/2018 26.7 22.0 - 29.0 mmol/L Final     Chloride   Date Value Ref Range Status   08/20/2018 104 98 - 107 mmol/L Final   06/30/2017 106 98 - 107 mmol/L Final     Anion Gap   Date Value Ref Range Status   06/30/2017 11.1 mmol/L Final     Creatinine   Date Value Ref Range Status   08/20/2018 0.74 0.57 - 1.00 mg/dL Final   06/30/2017 0.98 0.57 - 1.00 mg/dL Final     BUN   Date Value Ref Range Status   08/20/2018 13 6 - 20 mg/dL Final   06/30/2017 15 6 - 20 mg/dL Final     BUN/Creatinine Ratio   Date Value Ref Range Status   08/20/2018 17.6 7.0 - 25.0 Final   06/30/2017 15.3 7.0 - 25.0 Final     Calcium   Date Value Ref Range Status   08/20/2018 8.6 8.6 - 10.5 mg/dL Final   06/30/2017 9.1 8.6 - 10.5 mg/dL Final     eGFR Non  Amer   Date Value Ref Range Status   06/30/2017 62 >60 mL/min/1.73 Final     eGFR Non  Am   Date Value Ref Range Status    08/20/2018 85 >60 mL/min/1.73 Final     Alkaline Phosphatase   Date Value Ref Range Status   08/20/2018 76 39 - 117 U/L Final     ALT (SGPT)   Date Value Ref Range Status   08/20/2018 40 (H) 1 - 33 U/L Final     AST (SGOT)   Date Value Ref Range Status   08/20/2018 16 1 - 32 U/L Final     Total Bilirubin   Date Value Ref Range Status   08/20/2018 0.2 0.1 - 1.2 mg/dL Final     Albumin   Date Value Ref Range Status   08/20/2018 4.50 3.50 - 5.20 g/dL Final     A/G Ratio   Date Value Ref Range Status   08/20/2018 1.8 g/dL Final         Imaging Results (last 7 days)     ** No results found for the last 168 hours. **            No notes on file    Assessment/Plan    Irritable bowel: Mostly with diarrhea, pain chronic issue with acute worsening.  She has had workup before    Elevated ALT: New finding on recent labs.?  Fatty liver versus autoimmune versus lab variant    Plan  I will get the records from her past colonoscopy  Trial of Levsin as needed  Daily fiber supplementation with Citrucel  We'll follow her ALT measurement in 3 months.  If this elevation persists, will need serologic workup    Lissette was seen today for abdominal pain and nausea.    Diagnoses and all orders for this visit:    Irritable bowel syndrome with diarrhea  -     hyoscyamine (LEVSIN) 0.125 MG SL tablet; Take 1 tablet by mouth Every 6 (Six) Hours As Needed for Cramping or Diarrhea.  -     methylcellulose, Laxative, (CITRUCEL) 500 MG tablet tablet; Take 2 tablets by mouth 2 (Two) Times a Day With Meals.    Elevated ALT measurement        I have discussed the above plan with the patient.  They verbalize understanding and are in agreement with the plan.  They have been advised to contact the office for any questions, concerns, or changes related to their health.    Dictated utilizing Dragon dictation

## 2018-11-06 ENCOUNTER — APPOINTMENT (OUTPATIENT)
Dept: WOMENS IMAGING | Facility: HOSPITAL | Age: 43
End: 2018-11-06

## 2018-11-06 PROCEDURE — 77067 SCR MAMMO BI INCL CAD: CPT | Performed by: RADIOLOGY

## 2018-11-06 PROCEDURE — 77063 BREAST TOMOSYNTHESIS BI: CPT | Performed by: RADIOLOGY

## 2018-11-16 ENCOUNTER — OFFICE VISIT (OUTPATIENT)
Dept: FAMILY MEDICINE CLINIC | Facility: CLINIC | Age: 43
End: 2018-11-16

## 2018-11-16 VITALS
RESPIRATION RATE: 16 BRPM | DIASTOLIC BLOOD PRESSURE: 72 MMHG | SYSTOLIC BLOOD PRESSURE: 122 MMHG | BODY MASS INDEX: 36.8 KG/M2 | HEIGHT: 62 IN | HEART RATE: 81 BPM | WEIGHT: 200 LBS | OXYGEN SATURATION: 100 % | TEMPERATURE: 98.4 F

## 2018-11-16 DIAGNOSIS — G43.009 MIGRAINE WITHOUT AURA AND WITHOUT STATUS MIGRAINOSUS, NOT INTRACTABLE: ICD-10-CM

## 2018-11-16 DIAGNOSIS — I10 ESSENTIAL HYPERTENSION: ICD-10-CM

## 2018-11-16 DIAGNOSIS — E03.9 ACQUIRED HYPOTHYROIDISM: Primary | ICD-10-CM

## 2018-11-16 DIAGNOSIS — R79.89 LFT ELEVATION: ICD-10-CM

## 2018-11-16 DIAGNOSIS — R79.89 ABNORMAL CBC: ICD-10-CM

## 2018-11-16 DIAGNOSIS — K21.00 GASTROESOPHAGEAL REFLUX DISEASE WITH ESOPHAGITIS: ICD-10-CM

## 2018-11-16 DIAGNOSIS — J45.30 MILD PERSISTENT ASTHMA WITHOUT COMPLICATION: ICD-10-CM

## 2018-11-16 DIAGNOSIS — F90.0 ATTENTION DEFICIT HYPERACTIVITY DISORDER (ADHD), PREDOMINANTLY INATTENTIVE TYPE: ICD-10-CM

## 2018-11-16 LAB
ALBUMIN SERPL-MCNC: 4.4 G/DL (ref 3.5–5.2)
ALP SERPL-CCNC: 82 U/L (ref 39–117)
ALT SERPL-CCNC: 28 U/L (ref 1–33)
AST SERPL-CCNC: 19 U/L (ref 1–32)
BASOPHILS # BLD AUTO: 0.04 10*3/MM3 (ref 0–0.2)
BASOPHILS NFR BLD AUTO: 0.7 % (ref 0–1.5)
BILIRUB DIRECT SERPL-MCNC: <0.2 MG/DL (ref 0–0.3)
BILIRUB SERPL-MCNC: 0.4 MG/DL (ref 0.1–1.2)
EOSINOPHIL # BLD AUTO: 0.41 10*3/MM3 (ref 0–0.7)
EOSINOPHIL NFR BLD AUTO: 7.1 % (ref 0.3–6.2)
ERYTHROCYTE [DISTWIDTH] IN BLOOD BY AUTOMATED COUNT: 13.1 % (ref 11.7–13)
HCT VFR BLD AUTO: 44.3 % (ref 35.6–45.5)
HGB BLD-MCNC: 13.7 G/DL (ref 11.9–15.5)
IMM GRANULOCYTES # BLD: 0 10*3/MM3 (ref 0–0.03)
IMM GRANULOCYTES NFR BLD: 0 % (ref 0–0.5)
LYMPHOCYTES # BLD AUTO: 1.14 10*3/MM3 (ref 0.9–4.8)
LYMPHOCYTES NFR BLD AUTO: 19.8 % (ref 19.6–45.3)
MCH RBC QN AUTO: 30.5 PG (ref 26.9–32)
MCHC RBC AUTO-ENTMCNC: 30.9 G/DL (ref 32.4–36.3)
MCV RBC AUTO: 98.7 FL (ref 80.5–98.2)
MONOCYTES # BLD AUTO: 0.48 10*3/MM3 (ref 0.2–1.2)
MONOCYTES NFR BLD AUTO: 8.3 % (ref 5–12)
NEUTROPHILS # BLD AUTO: 3.68 10*3/MM3 (ref 1.9–8.1)
NEUTROPHILS NFR BLD AUTO: 64.1 % (ref 42.7–76)
PLATELET # BLD AUTO: 364 10*3/MM3 (ref 140–500)
RBC # BLD AUTO: 4.49 10*6/MM3 (ref 3.9–5.2)
WBC # BLD AUTO: 5.75 10*3/MM3 (ref 4.5–10.7)

## 2018-11-16 PROCEDURE — 99214 OFFICE O/P EST MOD 30 MIN: CPT | Performed by: PHYSICIAN ASSISTANT

## 2018-11-16 NOTE — PATIENT INSTRUCTIONS
Stop Adderall if it makes your heart pound and/or race.  It can effect you being able to fall asleep.  It can be habit forming.  Do not share you medication with anyone.  Do not drink alcohol with this medication.  Use the lowest effective dose.  Low glycemic index diet  Exercise 30 minutes most days of the week  Make sure you get results on any labs or tests we ordered today  We discussed medications and how to take them as prescribed  Sleep 6-8 hours each night if possible  If you have not signed up for [employer], please activate your code ASAP from your After Visit Summary today    LDL goal <100  LDL goal if heart disease <70  HDL goal >60  Triglyceride goal <150  BP goal =<130/80  Fasting glucose <100

## 2018-11-16 NOTE — PROGRESS NOTES
Subjective   Lissette Hawthorne is a 43 y.o. female.     History of Present Illness   Lissette Hawthorne 43 y.o. female who presents for ADD/ADHD follow up. She wants to go back on Adderall and did better on this vs Vyvnase. The patient has read and signed the [facility] Controlled Substance Contract.  I will continue to see patient for regular follow up appointments and give the lowest effective dose.  They are well controlled on their medication at the lowest effective dose.  She has a previous history of a diagnosis of  ADD/ADHD.   ASIF has been reviewed by me and is updated every 3 months. The patient is aware of the potential for addiction and dependence.  The Rx continues to help them concentrate, finish tasks in timely manor, and succeed.  She denies current suicidal and homicidal ideation.  Reviewed causes for potential of discontinuation of stimulant medication,  including but not limited to consideration for diversion, obtaining other controlled substances from other license practitioners, or  inappropriate office behavior.  Patient understands to use a controlled substance as prescribed by physician and to avoid improper use of controlled substances.  Patient will also verbalized understanding that prescriptions to be filled at the same pharmacy  unless office staff is made aware of this.  Patient understands they can be submitted to random urine drug screens and/or random pill counts on any request.  Patient understands they are not to receive early refills.  The patient must produce an official police report for any effort to replace controlled substances that are lost or stolen.  No use of illegal street drugs while receiving controlled substances from this prescribing provider.  The patient is to take medication as prescribed  and not deviate from the normal schedule without consultation from the provider.  Patient is not to share the medication with others or to take medication with alcohol or  other sedatives.  Use caution when driving or operating machinery.  Must always keep the prescription in the original container.  Patient is to store controlled substances in a locked cabinet or other secure storage unit that is cool, dry and out of sunlight.  Patient must immediately notify office if this controlled substances is  improperly taken by another individual.  Reviewed risk for physical dependence, tolerance and addiction.    There is no evidence of aberrant behavior or any red flags.  .         No family or personal history of addiction.  [facility] 7-1-15  Lissettefabienne Hawthorne 43 y.o. female who presents today for routine follow up check and medication refills.  she has a history of   Patient Active Problem List   Diagnosis   • ADD (attention deficit disorder)   • Essential hypertension   • Mild persistent asthma without complication   • Migraine without aura and without status migrainosus, not intractable   • Impaired visual perception   • Gastroesophageal reflux disease with esophagitis   • Abnormal electroencephalogram (EEG)   • Lumbar radiculopathy   • Degeneration of intervertebral disc of lumbar region   • Spinal stenosis of lumbar region   • Herniated lumbar intervertebral disc   • Class 1 obesity   • Postoperative state   • Moderate persistent asthma with acute exacerbation   • Seasonal allergic rhinitis   • Ankylosis of lumbar spine   • Status post lumbar spine operation   • Acquired hypothyroidism   • Abnormal finding on MRI of brain   • Hyperosmia   • Dysosmia   .  Since the last visit, she has overall felt well.  She has Hypertenision and is well controlled on medication, GERD and is well controlled on PPI medication, Hyperlipidemia and working on this with diet and exercise, Hypothyroidism and is well controlled on Rx.  Labs are in desired treatment range and Vitamin D deficiency and well controlled on medication and labs at goal >30.  she has been compliant with  current medications have reviewed them.  The patient denies medication side effects.    Results for orders placed or performed in visit on 08/20/18   Comprehensive metabolic panel   Result Value Ref Range    Glucose 89 65 - 99 mg/dL    BUN 13 6 - 20 mg/dL    Creatinine 0.74 0.57 - 1.00 mg/dL    eGFR Non African Am 85 >60 mL/min/1.73    eGFR African Am 103 >60 mL/min/1.73    BUN/Creatinine Ratio 17.6 7.0 - 25.0    Sodium 143 136 - 145 mmol/L    Potassium 4.7 3.5 - 5.2 mmol/L    Chloride 104 98 - 107 mmol/L    Total CO2 26.7 22.0 - 29.0 mmol/L    Calcium 8.6 8.6 - 10.5 mg/dL    Total Protein 7.0 6.0 - 8.5 g/dL    Albumin 4.50 3.50 - 5.20 g/dL    Globulin 2.5 gm/dL    A/G Ratio 1.8 g/dL    Total Bilirubin 0.2 0.1 - 1.2 mg/dL    Alkaline Phosphatase 76 39 - 117 U/L    AST (SGOT) 16 1 - 32 U/L    ALT (SGPT) 40 (H) 1 - 33 U/L   Lipid panel   Result Value Ref Range    Total Cholesterol 200 0 - 200 mg/dL    Triglycerides 68 0 - 150 mg/dL    HDL Cholesterol 75 (H) 40 - 60 mg/dL    VLDL Cholesterol 13.6 5 - 40 mg/dL    LDL Cholesterol  111 (H) 0 - 100 mg/dL   TSH   Result Value Ref Range    TSH 1.490 0.270 - 4.200 mIU/mL   T4, Free   Result Value Ref Range    Free T4 1.18 0.93 - 1.70 ng/dL   T3, Free   Result Value Ref Range    T3, Free 2.8 2.0 - 4.4 pg/mL   Vitamin B12   Result Value Ref Range    Vitamin B-12 468 211 - 946 pg/mL   Folate   Result Value Ref Range    Folate 10.31 4.78 - 24.20 ng/mL   Vitamin D 25 Hydroxy   Result Value Ref Range    25 Hydroxy, Vitamin D 32.9 30.0 - 100.0 ng/ml   Protime-INR   Result Value Ref Range    INR 1.03 0.90 - 1.10    Protime 13.3 11.7 - 14.2 Seconds   APTT   Result Value Ref Range    aPTT 29.2 22.7 - 35.4 seconds   Iron Profile   Result Value Ref Range    TIBC 402 mcg/dL    UIBC 350 mcg/dL    Iron 52 37 - 145 mcg/dL    Iron Saturation 13 (L) 20 - 50 %   Hepatitis Panel, Acute   Result Value Ref Range    Hep A IgM Negative Negative    Hepatitis B Surface Ag Negative Negative    Hep B  Core IgM Negative Negative    Hep C Virus Ab 0.1 0.0 - 0.9 s/co ratio   Please Note   Result Value Ref Range    Please note Comment    Written Authorization   Result Value Ref Range    Written Authorization Comment    CBC and Differential   Result Value Ref Range    WBC 6.12 4.50 - 10.70 10*3/mm3    RBC 4.32 3.90 - 5.20 10*6/mm3    Hemoglobin 13.0 11.9 - 15.5 g/dL    Hematocrit 43.7 35.6 - 45.5 %    .2 (H) 80.5 - 98.2 fL    MCH 30.1 26.9 - 32.0 pg    MCHC 29.7 (L) 32.4 - 36.3 g/dL    RDW 13.5 (H) 11.7 - 13.0 %    Platelets 336 140 - 500 10*3/mm3    Neutrophil Rel % 69.9 42.7 - 76.0 %    Lymphocyte Rel % 16.2 (L) 19.6 - 45.3 %    Monocyte Rel % 8.8 5.0 - 12.0 %    Eosinophil Rel % 4.1 0.3 - 6.2 %    Basophil Rel % 1.0 0.0 - 1.5 %    Neutrophils Absolute 4.28 1.90 - 8.10 10*3/mm3    Lymphocytes Absolute 0.99 0.90 - 4.80 10*3/mm3    Monocytes Absolute 0.54 0.20 - 1.20 10*3/mm3    Eosinophils Absolute 0.25 0.00 - 0.70 10*3/mm3    Basophils Absolute 0.06 0.00 - 0.20 10*3/mm3    Immature Granulocyte Rel % 0.0 0.0 - 0.5 %    Immature Grans Absolute 0.00 0.00 - 0.03 10*3/mm3     She had work up in last year Dr. Wesley and tapered off Zonisamide for migraines and was taking for migraine prevention; not seizures; taking Aleve PRN; No help Imitrex; Aleve helps  Sees pulm  I have labs to f/u on the ALT at 40 and     The following portions of the patient's history were reviewed and updated as appropriate: allergies, current medications, past family history, past medical history, past social history, past surgical history and problem list.    Review of Systems   Constitutional: Negative for activity change, appetite change and unexpected weight change.   HENT: Negative for nosebleeds and trouble swallowing.    Eyes: Negative for pain and visual disturbance.   Respiratory: Negative for chest tightness, shortness of breath and wheezing.    Cardiovascular: Negative for chest pain and palpitations.   Gastrointestinal:  Negative for abdominal pain and blood in stool.   Endocrine: Negative.    Genitourinary: Negative for difficulty urinating and hematuria.   Musculoskeletal: Negative for joint swelling.   Skin: Positive for rash. Negative for color change.   Allergic/Immunologic: Negative.    Neurological: Negative for syncope and speech difficulty.   Hematological: Negative for adenopathy.   Psychiatric/Behavioral: Negative for agitation and confusion.   All other systems reviewed and are negative.      Objective   Physical Exam   Constitutional: She is oriented to person, place, and time. She appears well-developed and well-nourished. No distress.   HENT:   Head: Normocephalic and atraumatic.   Eyes: Conjunctivae and EOM are normal. Pupils are equal, round, and reactive to light. Right eye exhibits no discharge. Left eye exhibits no discharge. No scleral icterus.   Neck: Normal range of motion. Neck supple. No tracheal deviation present. No thyromegaly present.   Cardiovascular: Normal rate, regular rhythm, normal heart sounds, intact distal pulses and normal pulses. Exam reveals no gallop.   No murmur heard.  Pulmonary/Chest: Effort normal and breath sounds normal. No respiratory distress. She has no wheezes. She has no rales.   Musculoskeletal: Normal range of motion.   Neurological: She is alert and oriented to person, place, and time. She exhibits normal muscle tone. Coordination normal.   Skin: Skin is warm. No rash noted. No erythema. No pallor.   Psychiatric: She has a normal mood and affect. Her behavior is normal. Judgment and thought content normal.   Nursing note and vitals reviewed.      Assessment/Plan   Lissette was seen today for hypothyroidism.    Diagnoses and all orders for this visit:    Acquired hypothyroidism    Gastroesophageal reflux disease with esophagitis    Mild persistent asthma without complication    Attention deficit hyperactivity disorder (ADHD), predominantly inattentive type    Essential  hypertension    Migraine without aura and without status migrainosus, not intractable

## 2018-11-17 RX ORDER — DEXTROAMPHETAMINE SACCHARATE, AMPHETAMINE ASPARTATE, DEXTROAMPHETAMINE SULFATE AND AMPHETAMINE SULFATE 5; 5; 5; 5 MG/1; MG/1; MG/1; MG/1
20 TABLET ORAL 2 TIMES DAILY
Qty: 60 TABLET | Refills: 0 | Status: SHIPPED | OUTPATIENT
Start: 2018-11-17 | End: 2018-12-18

## 2018-12-18 ENCOUNTER — OFFICE VISIT (OUTPATIENT)
Dept: FAMILY MEDICINE CLINIC | Facility: CLINIC | Age: 43
End: 2018-12-18

## 2018-12-18 VITALS
HEART RATE: 86 BPM | HEIGHT: 62 IN | DIASTOLIC BLOOD PRESSURE: 72 MMHG | OXYGEN SATURATION: 100 % | TEMPERATURE: 98.5 F | SYSTOLIC BLOOD PRESSURE: 120 MMHG | WEIGHT: 193 LBS | RESPIRATION RATE: 16 BRPM | BODY MASS INDEX: 35.51 KG/M2

## 2018-12-18 DIAGNOSIS — F90.0 ATTENTION DEFICIT HYPERACTIVITY DISORDER (ADHD), PREDOMINANTLY INATTENTIVE TYPE: Primary | ICD-10-CM

## 2018-12-18 DIAGNOSIS — I10 ESSENTIAL HYPERTENSION: ICD-10-CM

## 2018-12-18 PROCEDURE — 99213 OFFICE O/P EST LOW 20 MIN: CPT | Performed by: PHYSICIAN ASSISTANT

## 2018-12-18 RX ORDER — DEXTROAMPHETAMINE SACCHARATE, AMPHETAMINE ASPARTATE, DEXTROAMPHETAMINE SULFATE AND AMPHETAMINE SULFATE 7.5; 7.5; 7.5; 7.5 MG/1; MG/1; MG/1; MG/1
30 TABLET ORAL 2 TIMES DAILY
Qty: 60 TABLET | Refills: 0 | Status: SHIPPED | OUTPATIENT
Start: 2018-12-18 | End: 2019-01-15 | Stop reason: SDUPTHER

## 2018-12-18 NOTE — PROGRESS NOTES
Subjective   Lissette Hawthorne is a 43 y.o. female.          No family or personal history of addiction.  [facility] 7-1-15  History of Present Illness     She was weaned off anticonvulsant by DR Lupillo Mclaughlin ELSA Marine 43 y.o. female who presents for ADD/ADHD follow up. They are well controlled on their current Rx.  No new problems with insomnia, tachycardia.  Weight is back down and watching it.  I did change Vyvanse back to Adderall and better control of ADD.  Still wears off and ready to go back to 30mg dose and was on in past. The patient has read and signed the [facility] Controlled Substance Contract.  I will continue to see patient for regular follow up appointments and give the lowest effective dose.  They are well controlled on their medication at the lowest effective dose.  She has a previous history of a diagnosis of  ADD/ADHD.   ASIF has been reviewed by me and is updated every 3 months. The patient is aware of the potential for addiction and dependence.  The Rx continues to help them concentrate, finish tasks in timely manor, and succeed.  She denies current suicidal and homicidal ideation.  Reviewed causes for potential of discontinuation of stimulant medication,  including but not limited to consideration for diversion, obtaining other controlled substances from other license practitioners, or  inappropriate office behavior.  Patient understands to use a controlled substance as prescribed by physician and to avoid improper use of controlled substances.  Patient will also verbalized understanding that prescriptions to be filled at the same pharmacy  unless office staff is made aware of this.  Patient understands they can be submitted to random urine drug screens and/or random pill counts on any request.  Patient understands they are not to receive early refills.  The patient must produce an official police report for any effort to replace controlled substances that are  lost or stolen.  No use of illegal street drugs while receiving controlled substances from this prescribing provider.  The patient is to take medication as prescribed  and not deviate from the normal schedule without consultation from the provider.  Patient is not to share the medication with others or to take medication with alcohol or other sedatives.  Use caution when driving or operating machinery.  Must always keep the prescription in the original container.  Patient is to store controlled substances in a locked cabinet or other secure storage unit that is cool, dry and out of sunlight.  Patient must immediately notify office if this controlled substances is  improperly taken by another individual.  Reviewed risk for physical dependence, tolerance and addiction.    There is no evidence of aberrant behavior or any red flags.  .     BP still controlled on Rx  I did f/u labs and LFTs in range  Saw pulm today for asthma  Lab Results   Component Value Date    TSH 1.490 08/20/2018         The following portions of the patient's history were reviewed and updated as appropriate: allergies, current medications, past family history, past medical history, past social history, past surgical history and problem list.    Review of Systems   Constitutional: Negative for activity change, appetite change and unexpected weight change.   HENT: Negative for nosebleeds and trouble swallowing.    Eyes: Negative for pain and visual disturbance.   Respiratory: Negative for chest tightness, shortness of breath and wheezing.    Cardiovascular: Negative for chest pain and palpitations.   Gastrointestinal: Negative for abdominal pain and blood in stool.   Endocrine: Negative.    Genitourinary: Negative for difficulty urinating and hematuria.   Musculoskeletal: Negative for joint swelling.   Skin: Negative for color change and rash.   Allergic/Immunologic: Negative.    Neurological: Negative for syncope and speech difficulty.    Hematological: Negative for adenopathy.   Psychiatric/Behavioral: Positive for decreased concentration. Negative for agitation and confusion.   All other systems reviewed and are negative.      Objective   Physical Exam   Constitutional: She is oriented to person, place, and time. She appears well-developed and well-nourished. No distress.   HENT:   Head: Normocephalic and atraumatic.   Eyes: Conjunctivae and EOM are normal. Pupils are equal, round, and reactive to light. Right eye exhibits no discharge. Left eye exhibits no discharge. No scleral icterus.   Neck: Normal range of motion. Neck supple. No tracheal deviation present. No thyromegaly present.   Cardiovascular: Normal rate, regular rhythm, normal heart sounds, intact distal pulses and normal pulses. Exam reveals no gallop.   No murmur heard.  Pulmonary/Chest: Effort normal and breath sounds normal. No respiratory distress. She has no wheezes. She has no rales.   Musculoskeletal: Normal range of motion.   Neurological: She is alert and oriented to person, place, and time. She exhibits normal muscle tone. Coordination normal.   Skin: Skin is warm. No rash noted. No erythema. No pallor.   Psychiatric: She has a normal mood and affect. Her behavior is normal. Judgment and thought content normal.   Nursing note and vitals reviewed.      Assessment/Plan   Problems Addressed this Visit        Cardiovascular and Mediastinum    Essential hypertension       Other    ADD (attention deficit disorder) - Primary

## 2018-12-18 NOTE — PATIENT INSTRUCTIONS
Stop Adderall if it makes your heart pound and/or race.  It can effect you being able to fall asleep.  It can be habit forming.  Do not share you medication with anyone.  Do not drink alcohol with this medication.  Use the lowest effective dose.  Low glycemic index diet  Exercise 30 minutes most days of the week  Make sure you get results on any labs or tests we ordered today  We discussed medications and how to take them as prescribed  Sleep 6-8 hours each night if possible  If you have not signed up for [facility], please activate your code ASAP from your After Visit Summary today    LDL goal <100  LDL goal if heart disease <70  HDL goal >60  Triglyceride goal <150  BP goal =<130/80  Fasting glucose <100

## 2019-01-15 ENCOUNTER — OFFICE VISIT (OUTPATIENT)
Dept: FAMILY MEDICINE CLINIC | Facility: CLINIC | Age: 44
End: 2019-01-15

## 2019-01-15 VITALS
HEART RATE: 95 BPM | SYSTOLIC BLOOD PRESSURE: 112 MMHG | OXYGEN SATURATION: 99 % | RESPIRATION RATE: 16 BRPM | TEMPERATURE: 98.5 F | BODY MASS INDEX: 33.86 KG/M2 | HEIGHT: 62 IN | DIASTOLIC BLOOD PRESSURE: 78 MMHG | WEIGHT: 184 LBS

## 2019-01-15 DIAGNOSIS — F90.0 ATTENTION DEFICIT HYPERACTIVITY DISORDER (ADHD), PREDOMINANTLY INATTENTIVE TYPE: Primary | ICD-10-CM

## 2019-01-15 PROCEDURE — 99212 OFFICE O/P EST SF 10 MIN: CPT | Performed by: PHYSICIAN ASSISTANT

## 2019-01-15 RX ORDER — DEXTROAMPHETAMINE SACCHARATE, AMPHETAMINE ASPARTATE, DEXTROAMPHETAMINE SULFATE AND AMPHETAMINE SULFATE 7.5; 7.5; 7.5; 7.5 MG/1; MG/1; MG/1; MG/1
30 TABLET ORAL 2 TIMES DAILY
Qty: 60 TABLET | Refills: 0 | Status: SHIPPED | OUTPATIENT
Start: 2019-01-15 | End: 2019-02-27 | Stop reason: SDUPTHER

## 2019-01-15 NOTE — PROGRESS NOTES
Subjective   Lissette Hawthorne is a 43 y.o. female.     History of Present Illness   We went back up to the 30mg Adderall she had been on.  This is working very well.  Feels more focus and confident.  Much more productive.  Eating and sleeping well.  Weight is down and again, off anticonvulsant; will watch; she is also trying    No family or personal history of addiction.  [facility] 7-1-15  History of Present Illness      She was weaned off anticonvulsant by DR Wesley  Lissette Hawthorne 43 y.o. female who presents for ADD/ADHD follow up. They are well controlled on their current Rx.  No new problems with insomnia, tachycardia.  Weight is back down and watching it---see above notes.  I did change Vyvanse back to Adderall and better control of ADD.  Still is back to 30mg dose and was on in past. The patient has read and signed the [facility] Controlled Substance Contract.  I will continue to see patient for regular follow up appointments and give the lowest effective dose.  They are well controlled on their medication at the lowest effective dose.  She has a previous history of a diagnosis of  ADD/ADHD.   ASIF has been reviewed by me and is updated every 3 months. The patient is aware of the potential for addiction and dependence.  The Rx continues to help them concentrate, finish tasks in timely manor, and succeed.  She denies current suicidal and homicidal ideation.  Reviewed causes for potential of discontinuation of stimulant medication,  including but not limited to consideration for diversion, obtaining other controlled substances from other license practitioners, or  inappropriate office behavior.  Patient understands to use a controlled substance as prescribed by physician and to avoid improper use of controlled substances.  Patient will also verbalized understanding that prescriptions to be filled at the same pharmacy  unless office staff is made aware of this.  Patient  understands they can be submitted to random urine drug screens and/or random pill counts on any request.  Patient understands they are not to receive early refills.  The patient must produce an official police report for any effort to replace controlled substances that are lost or stolen.  No use of illegal street drugs while receiving controlled substances from this prescribing provider.  The patient is to take medication as prescribed  and not deviate from the normal schedule without consultation from the provider.  Patient is not to share the medication with others or to take medication with alcohol or other sedatives.  Use caution when driving or operating machinery.  Must always keep the prescription in the original container.  Patient is to store controlled substances in a locked cabinet or other secure storage unit that is cool, dry and out of sunlight.  Patient must immediately notify office if this controlled substances is  improperly taken by another individual.  Reviewed risk for physical dependence, tolerance and addiction.    There is no evidence of aberrant behavior or any red flags.  .      BP still controlled on Rx    Sees pulm for asthma    The following portions of the patient's history were reviewed and updated as appropriate: allergies, current medications, past family history, past medical history, past social history, past surgical history and problem list.    Review of Systems   Constitutional: Negative for activity change, appetite change and unexpected weight change.   HENT: Negative for nosebleeds and trouble swallowing.    Eyes: Negative for pain and visual disturbance.   Respiratory: Negative for chest tightness, shortness of breath and wheezing.    Cardiovascular: Negative for chest pain and palpitations.   Gastrointestinal: Negative for abdominal pain and blood in stool.   Endocrine: Negative.    Genitourinary: Negative for difficulty urinating and hematuria.   Musculoskeletal: Negative  for joint swelling.   Skin: Negative for color change and rash.   Allergic/Immunologic: Negative.    Neurological: Negative for syncope and speech difficulty.   Hematological: Negative for adenopathy.   Psychiatric/Behavioral: Negative for agitation, confusion and sleep disturbance.   All other systems reviewed and are negative.      Objective   Physical Exam   Constitutional: She is oriented to person, place, and time. She appears well-developed and well-nourished. No distress.   HENT:   Head: Normocephalic.   Eyes: Conjunctivae and EOM are normal. Pupils are equal, round, and reactive to light.   Neck: Normal range of motion. Neck supple.   Cardiovascular: Normal rate, regular rhythm and normal heart sounds.   No murmur heard.  Pulmonary/Chest: Effort normal and breath sounds normal.   Musculoskeletal: Normal range of motion.   Neurological: She is alert and oriented to person, place, and time.   Skin: Skin is warm and dry. No rash noted. She is not diaphoretic.   Psychiatric: Her behavior is normal. Judgment and thought content normal. Her affect is not inappropriate. She is not actively hallucinating. Cognition and memory are normal.   Nursing note and vitals reviewed.      Assessment/Plan   Lissette was seen today for add.    Diagnoses and all orders for this visit:    Attention deficit hyperactivity disorder (ADHD), predominantly inattentive type

## 2019-02-27 RX ORDER — DEXTROAMPHETAMINE SACCHARATE, AMPHETAMINE ASPARTATE, DEXTROAMPHETAMINE SULFATE AND AMPHETAMINE SULFATE 7.5; 7.5; 7.5; 7.5 MG/1; MG/1; MG/1; MG/1
30 TABLET ORAL 2 TIMES DAILY
Qty: 60 TABLET | Refills: 0 | Status: SHIPPED | OUTPATIENT
Start: 2019-02-27 | End: 2019-03-26 | Stop reason: SDUPTHER

## 2019-03-26 RX ORDER — DEXTROAMPHETAMINE SACCHARATE, AMPHETAMINE ASPARTATE, DEXTROAMPHETAMINE SULFATE AND AMPHETAMINE SULFATE 7.5; 7.5; 7.5; 7.5 MG/1; MG/1; MG/1; MG/1
30 TABLET ORAL 2 TIMES DAILY
Qty: 60 TABLET | Refills: 0 | Status: SHIPPED | OUTPATIENT
Start: 2019-03-26 | End: 2019-04-12 | Stop reason: SDUPTHER

## 2019-03-26 NOTE — TELEPHONE ENCOUNTER
Regarding: Prescription Question  Contact: [phone]  ----- Message from [employer], Generic sent at 3/26/2019  8:00 AM EDT -----    Good Morning,    My prescription for my Adderall is due on Sunday and I'm leaving to go out of town on Saturday. My pharmacy said they can fill it on Thursday. Can you please send over my prescription to them.   Thank you,  Arina Ibanez

## 2019-04-12 ENCOUNTER — OFFICE VISIT (OUTPATIENT)
Dept: FAMILY MEDICINE CLINIC | Facility: CLINIC | Age: 44
End: 2019-04-12

## 2019-04-12 VITALS
HEART RATE: 77 BPM | DIASTOLIC BLOOD PRESSURE: 72 MMHG | OXYGEN SATURATION: 100 % | TEMPERATURE: 97.6 F | RESPIRATION RATE: 16 BRPM | WEIGHT: 186 LBS | HEIGHT: 62 IN | BODY MASS INDEX: 34.23 KG/M2 | SYSTOLIC BLOOD PRESSURE: 120 MMHG

## 2019-04-12 DIAGNOSIS — J45.40 MODERATE PERSISTENT ASTHMA WITHOUT COMPLICATION: ICD-10-CM

## 2019-04-12 DIAGNOSIS — E03.9 ACQUIRED HYPOTHYROIDISM: ICD-10-CM

## 2019-04-12 DIAGNOSIS — I10 ESSENTIAL HYPERTENSION: ICD-10-CM

## 2019-04-12 DIAGNOSIS — K21.00 GASTROESOPHAGEAL REFLUX DISEASE WITH ESOPHAGITIS: ICD-10-CM

## 2019-04-12 DIAGNOSIS — F98.8 ATTENTION DEFICIT DISORDER (ADD) WITHOUT HYPERACTIVITY: Primary | ICD-10-CM

## 2019-04-12 DIAGNOSIS — H53.9 IMPAIRED VISUAL PERCEPTION: ICD-10-CM

## 2019-04-12 PROCEDURE — 99214 OFFICE O/P EST MOD 30 MIN: CPT | Performed by: PHYSICIAN ASSISTANT

## 2019-04-12 RX ORDER — AMLODIPINE BESYLATE 10 MG/1
10 TABLET ORAL DAILY
Qty: 90 TABLET | Refills: 1 | Status: SHIPPED | OUTPATIENT
Start: 2019-04-12 | End: 2019-04-12 | Stop reason: SDUPTHER

## 2019-04-12 RX ORDER — DEXTROAMPHETAMINE SACCHARATE, AMPHETAMINE ASPARTATE, DEXTROAMPHETAMINE SULFATE AND AMPHETAMINE SULFATE 7.5; 7.5; 7.5; 7.5 MG/1; MG/1; MG/1; MG/1
30 TABLET ORAL 2 TIMES DAILY
Qty: 60 TABLET | Refills: 0 | Status: SHIPPED | OUTPATIENT
Start: 2019-04-12 | End: 2019-05-12 | Stop reason: SDUPTHER

## 2019-04-12 RX ORDER — AMLODIPINE BESYLATE 10 MG/1
10 TABLET ORAL DAILY
Qty: 90 TABLET | Refills: 1 | Status: SHIPPED | OUTPATIENT
Start: 2019-04-12 | End: 2019-07-22 | Stop reason: SDUPTHER

## 2019-04-12 NOTE — PROGRESS NOTES
Subjective   Lissette Hawthonre is a 43 y.o. female.     History of Present Illness   Lissette Hawthorne 43 y.o. female who presents today for f/u on   she has a history of   Patient Active Problem List   Diagnosis   • ADD (attention deficit disorder)   • Essential hypertension   • Mild persistent asthma without complication   • Migraine without aura and without status migrainosus, not intractable   • Impaired visual perception   • Gastroesophageal reflux disease with esophagitis   • Abnormal electroencephalogram (EEG)   • Lumbar radiculopathy   • Degeneration of intervertebral disc of lumbar region   • Spinal stenosis of lumbar region   • Herniated lumbar intervertebral disc   • Class 1 obesity   • Postoperative state   • Moderate persistent asthma with acute exacerbation   • Seasonal allergic rhinitis   • Ankylosis of lumbar spine   • Status post lumbar spine operation   • Acquired hypothyroidism   • Abnormal finding on MRI of brain   • Hyperosmia   • Dysosmia   .      She is still on the 30mg Adderall.  This is working very well.  Feels more focus and confident.  Much more productive.  Eating and sleeping well.  Still off anticonvulsant   Did not fill March Rx---pharmacy out of Rx and cannot forward this.  I did review [facility] to confirm.  No family or personal history of addiction.  Matthew and  neuropsych eval 7-1-15  Will send this to [facility]    Lissette Hawthorne 43 y.o. female who presents today for routine follow up check and medication refills.  she has a history of   Patient Active Problem List   Diagnosis   • ADD (attention deficit disorder)   • Essential hypertension   • Mild persistent asthma without complication   • Migraine without aura and without status migrainosus, not intractable   • Impaired visual perception   • Gastroesophageal reflux disease with esophagitis   • Abnormal electroencephalogram (EEG)   • Lumbar radiculopathy   • Degeneration of intervertebral disc of lumbar region   • Spinal  stenosis of lumbar region   • Herniated lumbar intervertebral disc   • Class 1 obesity   • Postoperative state   • Moderate persistent asthma with acute exacerbation   • Seasonal allergic rhinitis   • Ankylosis of lumbar spine   • Status post lumbar spine operation   • Acquired hypothyroidism   • Abnormal finding on MRI of brain   • Hyperosmia   • Dysosmia   .  Since the last visit, she has overall felt fairly well.  She has Hypertenision and is well controlled on medication, GERD and is well controlled on PPI medication and Hypothyroidism and is well controlled on Rx.  Labs are in desired treatment range.  she has been compliant with current medications have reviewed them.  The patient denies medication side effects.  Sees pulm and has her on inhalers for asthma  Results for orders placed or performed in visit on 11/16/18   Hepatic Function Panel (6) ([facility])   Result Value Ref Range    Albumin 4.40 3.50 - 5.20 g/dL    Total Bilirubin 0.4 0.1 - 1.2 mg/dL    Bilirubin, Direct <0.2 0.0 - 0.3 mg/dL    Alkaline Phosphatase 82 39 - 117 U/L    AST (SGOT) 19 1 - 32 U/L    ALT (SGPT) 28 1 - 33 U/L   CBC & Differential   Result Value Ref Range    WBC 5.75 4.50 - 10.70 10*3/mm3    RBC 4.49 3.90 - 5.20 10*6/mm3    Hemoglobin 13.7 11.9 - 15.5 g/dL    Hematocrit 44.3 35.6 - 45.5 %    MCV 98.7 (H) 80.5 - 98.2 fL    MCH 30.5 26.9 - 32.0 pg    MCHC 30.9 (L) 32.4 - 36.3 g/dL    RDW 13.1 (H) 11.7 - 13.0 %    Platelets 364 140 - 500 10*3/mm3    Neutrophil Rel % 64.1 42.7 - 76.0 %    Lymphocyte Rel % 19.8 19.6 - 45.3 %    Monocyte Rel % 8.3 5.0 - 12.0 %    Eosinophil Rel % 7.1 (H) 0.3 - 6.2 %    Basophil Rel % 0.7 0.0 - 1.5 %    Neutrophils Absolute 3.68 1.90 - 8.10 10*3/mm3    Lymphocytes Absolute 1.14 0.90 - 4.80 10*3/mm3    Monocytes Absolute 0.48 0.20 - 1.20 10*3/mm3    Eosinophils Absolute 0.41 0.00 - 0.70 10*3/mm3    Basophils Absolute 0.04 0.00 - 0.20 10*3/mm3    Immature Granulocyte Rel % 0.0 0.0 - 0.5 %    Immature Grans  Absolute 0.00 0.00 - 0.03 10*3/mm3     Labs due August    The following portions of the patient's history were reviewed and updated as appropriate: allergies, current medications, past family history, past medical history, past social history, past surgical history and problem list.    Review of Systems   Constitutional: Negative for activity change, appetite change and unexpected weight change.   HENT: Negative for nosebleeds and trouble swallowing.    Eyes: Negative for pain and visual disturbance.   Respiratory: Negative for chest tightness, shortness of breath and wheezing.    Cardiovascular: Negative for chest pain and palpitations.   Gastrointestinal: Negative for abdominal pain and blood in stool.   Endocrine: Negative.    Genitourinary: Negative for difficulty urinating and hematuria.   Musculoskeletal: Negative for joint swelling.   Skin: Negative for color change and rash.   Allergic/Immunologic: Negative.    Neurological: Negative for syncope and speech difficulty.   Hematological: Negative for adenopathy.   Psychiatric/Behavioral: Negative for agitation and confusion.   All other systems reviewed and are negative.      Objective   Physical Exam   Constitutional: She is oriented to person, place, and time. She appears well-developed and well-nourished. No distress.   HENT:   Head: Normocephalic and atraumatic.   Eyes: Conjunctivae and EOM are normal. Pupils are equal, round, and reactive to light. Right eye exhibits no discharge. Left eye exhibits no discharge. No scleral icterus.   Neck: Normal range of motion. Neck supple. No tracheal deviation present. No thyromegaly present.   Cardiovascular: Normal rate, regular rhythm, normal heart sounds, intact distal pulses and normal pulses. Exam reveals no gallop.   No murmur heard.  Pulmonary/Chest: Effort normal and breath sounds normal. No respiratory distress. She has no wheezes. She has no rales.   Musculoskeletal: Normal range of motion.   Neurological:  She is alert and oriented to person, place, and time. She exhibits normal muscle tone. Coordination normal.   Skin: Skin is warm. No rash noted. No erythema. No pallor.   Psychiatric: She has a normal mood and affect. Her behavior is normal. Judgment and thought content normal.   Nursing note and vitals reviewed.      Assessment/Plan   Lissette was seen today for adhd.    Diagnoses and all orders for this visit:    Attention deficit disorder (ADD) without hyperactivity    Essential hypertension    Moderate persistent asthma with acute exacerbation    Gastroesophageal reflux disease with esophagitis    Impaired visual perception    Acquired hypothyroidism    Other orders  -     Discontinue: amLODIPine (NORVASC) 10 MG tablet; Take 1 tablet by mouth Daily. For BP  -     amLODIPine (NORVASC) 10 MG tablet; Take 1 tablet by mouth Daily. For BP      Did not fill March Rx---pharmacy out of Rx and cannot forward this.  I did review [facility] to confirm.  In summary, Lissette HUNTER JOBYManjusolangehuber, was seen today.  she was seen for  Hypertenision and is well controlled on medication, GERD and is well controlled on PPI medication, Hypothyroidism and is well controlled on Rx.  Labs are in desired treatment range, Seasonal allergies and is doing well on their medication PRN and Asthma and is well controlled on medication.  Patient is NOT using rescue Albuterol MDI >=2 times a week,

## 2019-05-12 RX ORDER — DEXTROAMPHETAMINE SACCHARATE, AMPHETAMINE ASPARTATE, DEXTROAMPHETAMINE SULFATE AND AMPHETAMINE SULFATE 7.5; 7.5; 7.5; 7.5 MG/1; MG/1; MG/1; MG/1
30 TABLET ORAL 2 TIMES DAILY
Qty: 60 TABLET | Refills: 0 | Status: SHIPPED | OUTPATIENT
Start: 2019-05-12 | End: 2019-06-11 | Stop reason: SDUPTHER

## 2019-06-11 RX ORDER — DEXTROAMPHETAMINE SACCHARATE, AMPHETAMINE ASPARTATE, DEXTROAMPHETAMINE SULFATE AND AMPHETAMINE SULFATE 7.5; 7.5; 7.5; 7.5 MG/1; MG/1; MG/1; MG/1
30 TABLET ORAL 2 TIMES DAILY
Qty: 60 TABLET | Refills: 0 | Status: SHIPPED | OUTPATIENT
Start: 2019-06-11 | End: 2019-07-22 | Stop reason: SDUPTHER

## 2019-07-22 ENCOUNTER — OFFICE VISIT (OUTPATIENT)
Dept: FAMILY MEDICINE CLINIC | Facility: CLINIC | Age: 44
End: 2019-07-22

## 2019-07-22 VITALS
HEART RATE: 82 BPM | WEIGHT: 186 LBS | HEIGHT: 62 IN | SYSTOLIC BLOOD PRESSURE: 110 MMHG | BODY MASS INDEX: 34.23 KG/M2 | TEMPERATURE: 98.4 F | DIASTOLIC BLOOD PRESSURE: 70 MMHG | RESPIRATION RATE: 16 BRPM | OXYGEN SATURATION: 100 %

## 2019-07-22 DIAGNOSIS — H53.9 IMPAIRED VISUAL PERCEPTION: ICD-10-CM

## 2019-07-22 DIAGNOSIS — J45.41 MODERATE PERSISTENT ASTHMA WITH ACUTE EXACERBATION: ICD-10-CM

## 2019-07-22 DIAGNOSIS — F98.8 ATTENTION DEFICIT DISORDER (ADD) WITHOUT HYPERACTIVITY: ICD-10-CM

## 2019-07-22 DIAGNOSIS — I10 ESSENTIAL HYPERTENSION: Primary | ICD-10-CM

## 2019-07-22 DIAGNOSIS — Z00.00 LABORATORY EXAMINATION ORDERED AS PART OF A ROUTINE GENERAL MEDICAL EXAMINATION: ICD-10-CM

## 2019-07-22 DIAGNOSIS — K21.9 GASTROESOPHAGEAL REFLUX DISEASE WITHOUT ESOPHAGITIS: ICD-10-CM

## 2019-07-22 DIAGNOSIS — E03.9 ACQUIRED HYPOTHYROIDISM: ICD-10-CM

## 2019-07-22 PROCEDURE — 99214 OFFICE O/P EST MOD 30 MIN: CPT | Performed by: PHYSICIAN ASSISTANT

## 2019-07-22 RX ORDER — ALBUTEROL SULFATE 90 UG/1
2 AEROSOL, METERED RESPIRATORY (INHALATION) EVERY 4 HOURS PRN
Qty: 1 INHALER | Refills: 11 | Status: SHIPPED | OUTPATIENT
Start: 2019-07-22 | End: 2020-07-31 | Stop reason: SDUPTHER

## 2019-07-22 RX ORDER — MONTELUKAST SODIUM 10 MG/1
10 TABLET ORAL NIGHTLY
Qty: 90 TABLET | Refills: 3 | Status: SHIPPED | OUTPATIENT
Start: 2019-07-22 | End: 2020-07-16

## 2019-07-22 RX ORDER — AMLODIPINE BESYLATE 10 MG/1
10 TABLET ORAL DAILY
Qty: 90 TABLET | Refills: 1 | Status: SHIPPED | OUTPATIENT
Start: 2019-07-22 | End: 2019-10-16 | Stop reason: SDUPTHER

## 2019-07-22 RX ORDER — ESOMEPRAZOLE MAGNESIUM 40 MG/1
40 CAPSULE, DELAYED RELEASE ORAL
Qty: 90 CAPSULE | Refills: 3 | Status: SHIPPED | OUTPATIENT
Start: 2019-07-22 | End: 2020-07-16 | Stop reason: SDUPTHER

## 2019-07-22 RX ORDER — LEVOTHYROXINE SODIUM 0.05 MG/1
50 TABLET ORAL DAILY
Qty: 90 TABLET | Refills: 3 | Status: SHIPPED | OUTPATIENT
Start: 2019-07-22 | End: 2020-01-22

## 2019-07-22 RX ORDER — DEXTROAMPHETAMINE SACCHARATE, AMPHETAMINE ASPARTATE, DEXTROAMPHETAMINE SULFATE AND AMPHETAMINE SULFATE 7.5; 7.5; 7.5; 7.5 MG/1; MG/1; MG/1; MG/1
30 TABLET ORAL 2 TIMES DAILY
Qty: 60 TABLET | Refills: 0 | Status: SHIPPED | OUTPATIENT
Start: 2019-07-22 | End: 2019-08-19 | Stop reason: SDUPTHER

## 2019-07-22 RX ORDER — TOLTERODINE TARTRATE 2 MG/1
2 TABLET, EXTENDED RELEASE ORAL 2 TIMES DAILY
COMMUNITY
End: 2021-02-01

## 2019-07-22 NOTE — PATIENT INSTRUCTIONS
"Stop Adderall   Hypertension  Hypertension, commonly called high blood pressure, is when the force of blood pumping through the arteries is too strong. The arteries are the blood vessels that carry blood from the heart throughout the body. Hypertension forces the heart to work harder to pump blood and may cause arteries to become narrow or stiff. Having untreated or uncontrolled hypertension can cause heart attacks, strokes, kidney disease, and other problems.  A blood pressure reading consists of a higher number over a lower number. Ideally, your blood pressure should be below 120/80. The first (\"top\") number is called the systolic pressure. It is a measure of the pressure in your arteries as your heart beats. The second (\"bottom\") number is called the diastolic pressure. It is a measure of the pressure in your arteries as the heart relaxes.  What are the causes?  The cause of this condition is not known.  What increases the risk?  Some risk factors for high blood pressure are under your control. Others are not.  Factors you can change  · Smoking.  · Having type 2 diabetes mellitus, high cholesterol, or both.  · Not getting enough exercise or physical activity.  · Being overweight.  · Having too much fat, sugar, calories, or salt (sodium) in your diet.  · Drinking too much alcohol.  Factors that are difficult or impossible to change  · Having chronic kidney disease.  · Having a family history of high blood pressure.  · Age. Risk increases with age.  · Race. You may be at higher risk if you are -American.  · Gender. Men are at higher risk than women before age 45. After age 65, women are at higher risk than men.  · Having obstructive sleep apnea.  · Stress.  What are the signs or symptoms?  Extremely high blood pressure (hypertensive crisis) may cause:  · Headache.  · Anxiety.  · Shortness of breath.  · Nosebleed.  · Nausea and vomiting.  · Severe chest pain.  · Jerky movements you cannot control " (seizures).    How is this diagnosed?  This condition is diagnosed by measuring your blood pressure while you are seated, with your arm resting on a surface. The cuff of the blood pressure monitor will be placed directly against the skin of your upper arm at the level of your heart. It should be measured at least twice using the same arm. Certain conditions can cause a difference in blood pressure between your right and left arms.  Certain factors can cause blood pressure readings to be lower or higher than normal (elevated) for a short period of time:  · When your blood pressure is higher when you are in a health care provider's office than when you are at home, this is called white coat hypertension. Most people with this condition do not need medicines.  · When your blood pressure is higher at home than when you are in a health care provider's office, this is called masked hypertension. Most people with this condition may need medicines to control blood pressure.    If you have a high blood pressure reading during one visit or you have normal blood pressure with other risk factors:  · You may be asked to return on a different day to have your blood pressure checked again.  · You may be asked to monitor your blood pressure at home for 1 week or longer.    If you are diagnosed with hypertension, you may have other blood or imaging tests to help your health care provider understand your overall risk for other conditions.  How is this treated?  This condition is treated by making healthy lifestyle changes, such as eating healthy foods, exercising more, and reducing your alcohol intake. Your health care provider may prescribe medicine if lifestyle changes are not enough to get your blood pressure under control, and if:  · Your systolic blood pressure is above 130.  · Your diastolic blood pressure is above 80.    Your personal target blood pressure may vary depending on your medical conditions, your age, and other  factors.  Follow these instructions at home:  Eating and drinking  · Eat a diet that is high in fiber and potassium, and low in sodium, added sugar, and fat. An example eating plan is called the DASH (Dietary Approaches to Stop Hypertension) diet. To eat this way:  ? Eat plenty of fresh fruits and vegetables. Try to fill half of your plate at each meal with fruits and vegetables.  ? Eat whole grains, such as whole wheat pasta, brown rice, or whole grain bread. Fill about one quarter of your plate with whole grains.  ? Eat or drink low-fat dairy products, such as skim milk or low-fat yogurt.  ? Avoid fatty cuts of meat, processed or cured meats, and poultry with skin. Fill about one quarter of your plate with lean proteins, such as fish, chicken without skin, beans, eggs, and tofu.  ? Avoid premade and processed foods. These tend to be higher in sodium, added sugar, and fat.  · Reduce your daily sodium intake. Most people with hypertension should eat less than 1,500 mg of sodium a day.  · Limit alcohol intake to no more than 1 drink a day for nonpregnant women and 2 drinks a day for men. One drink equals 12 oz of beer, 5 oz of wine, or 1½ oz of hard liquor.  Lifestyle  · Work with your health care provider to maintain a healthy body weight or to lose weight. Ask what an ideal weight is for you.  · Get at least 30 minutes of exercise that causes your heart to beat faster (aerobic exercise) most days of the week. Activities may include walking, swimming, or biking.  · Include exercise to strengthen your muscles (resistance exercise), such as pilates or lifting weights, as part of your weekly exercise routine. Try to do these types of exercises for 30 minutes at least 3 days a week.  · Do not use any products that contain nicotine or tobacco, such as cigarettes and e-cigarettes. If you need help quitting, ask your health care provider.  · Monitor your blood pressure at home as told by your health care provider.  · Keep  all follow-up visits as told by your health care provider. This is important.  Medicines  · Take over-the-counter and prescription medicines only as told by your health care provider. Follow directions carefully. Blood pressure medicines must be taken as prescribed.  · Do not skip doses of blood pressure medicine. Doing this puts you at risk for problems and can make the medicine less effective.  · Ask your health care provider about side effects or reactions to medicines that you should watch for.  Contact a health care provider if:  · You think you are having a reaction to a medicine you are taking.  · You have headaches that keep coming back (recurring).  · You feel dizzy.  · You have swelling in your ankles.  · You have trouble with your vision.  Get help right away if:  · You develop a severe headache or confusion.  · You have unusual weakness or numbness.  · You feel faint.  · You have severe pain in your chest or abdomen.  · You vomit repeatedly.  · You have trouble breathing.  Summary  · Hypertension is when the force of blood pumping through your arteries is too strong. If this condition is not controlled, it may put you at risk for serious complications.  · Your personal target blood pressure may vary depending on your medical conditions, your age, and other factors. For most people, a normal blood pressure is less than 120/80.  · Hypertension is treated with lifestyle changes, medicines, or a combination of both. Lifestyle changes include weight loss, eating a healthy, low-sodium diet, exercising more, and limiting alcohol.  This information is not intended to replace advice given to you by your health care provider. Make sure you discuss any questions you have with your health care provider.  Document Released: 12/18/2006 Document Revised: 11/15/2017 Document Reviewed: 11/15/2017  [employer] Interactive Patient Education © 2019 [employer]     if it makes your heart pound and/or race.  It can effect you  being able to fall asleep and cause weight loss.  It can be habit forming.  Do not share you medication with anyone.  Do not drink alcohol with this medication.  Use the lowest effective dose.

## 2019-07-22 NOTE — PROGRESS NOTES
Subjective   Lissette Hawthorne is a 44 y.o. female.     History of Present Illness     She is still working on diet and exercise ---losing weight      [facility] eval 7-1-15  Lissette Hawthorne 44 y.o. female who presents for ADD/ADHD follow up. They are well controlled on their current Rx.  No new problems with insomnia, tachycardia,or weight loss. The patient has read and signed the [facility] Controlled Substance Contract.  I will continue to see patient for regular follow up appointments and give the lowest effective dose.  They are well controlled on their medication at the lowest effective dose.  She has a previous history of a diagnosis of  ADD/ADHD.   ASIF has been reviewed by me and is updated every 3 months. The patient is aware of the potential for addiction and dependence.  The Rx continues to help them concentrate, finish tasks in timely manor, and succeed.  She denies current suicidal and homicidal ideation.  Reviewed causes for potential of discontinuation of stimulant medication,  including but not limited to consideration for diversion, obtaining other controlled substances from other license practitioners, or  inappropriate office behavior.  Patient understands to use a controlled substance as prescribed by physician and to avoid improper use of controlled substances.  Patient will also verbalized understanding that prescriptions to be filled at the same pharmacy  unless office staff is made aware of this.  Patient understands they can be submitted to random urine drug screens and/or random pill counts on any request.  Patient understands they are not to receive early refills.  The patient must produce an official police report for any effort to replace controlled substances that are lost or stolen.  No use of illegal street drugs while receiving controlled substances from this prescribing provider.  The patient is to take medication as prescribed  and not deviate from the  normal schedule without consultation from the provider.  Patient is not to share the medication with others or to take medication with alcohol or other sedatives.  Use caution when driving or operating machinery.  Must always keep the prescription in the original container.  Patient is to store controlled substances in a locked cabinet or other secure storage unit that is cool, dry and out of sunlight.  Patient must immediately notify office if this controlled substances is  improperly taken by another individual.  Reviewed risk for physical dependence, tolerance and addiction.    There is no evidence of aberrant behavior or any red flags.  .     Still of anticonvulsant  I do have her on bp med and controlled; also thyroid Rx---labs due in next few weeks.      DR Gregg restarted Ricky Hawthorne 44 y.o. female who presents today for routine follow up check and medication refills.  she has a history of   Patient Active Problem List   Diagnosis   • ADD (attention deficit disorder)   • Essential hypertension   • Migraine without aura and without status migrainosus, not intractable   • Impaired visual perception   • Gastroesophageal reflux disease without esophagitis   • Abnormal electroencephalogram (EEG)   • Lumbar radiculopathy   • Degeneration of intervertebral disc of lumbar region   • Spinal stenosis of lumbar region   • Herniated lumbar intervertebral disc   • Class 1 obesity   • Postoperative state   • Moderate persistent asthma with acute exacerbation   • Seasonal allergic rhinitis   • Ankylosis of lumbar spine   • Status post lumbar spine operation   • Acquired hypothyroidism   • Abnormal finding on MRI of brain   • Hyperosmia   • Dysosmia   .  Since the last visit, she has overall felt well.  She has Hypertenision and is well controlled on medication, GERD and is well controlled on PPI medication, Hypothyroidism and will need to update labs for continued treatment, Seasonal allergies and is doing  well on their medication PRN, Asthma and is well controlled on medication.  Patient is NOT using rescue Albuterol MDI >=2 times a week and Vitamin D deficiency and will update labs to confirm level is at goal >30.  she has been compliant with current medications have reviewed them.  The patient denies medication side effects.  Failed Omeprazole and Prevacid in past  OTC Aleve for migraines PRN and works  Results for orders placed or performed in visit on 11/16/18   Hepatic Function Panel (6) ([facility])   Result Value Ref Range    Albumin 4.40 3.50 - 5.20 g/dL    Total Bilirubin 0.4 0.1 - 1.2 mg/dL    Bilirubin, Direct <0.2 0.0 - 0.3 mg/dL    Alkaline Phosphatase 82 39 - 117 U/L    AST (SGOT) 19 1 - 32 U/L    ALT (SGPT) 28 1 - 33 U/L   CBC & Differential   Result Value Ref Range    WBC 5.75 4.50 - 10.70 10*3/mm3    RBC 4.49 3.90 - 5.20 10*6/mm3    Hemoglobin 13.7 11.9 - 15.5 g/dL    Hematocrit 44.3 35.6 - 45.5 %    MCV 98.7 (H) 80.5 - 98.2 fL    MCH 30.5 26.9 - 32.0 pg    MCHC 30.9 (L) 32.4 - 36.3 g/dL    RDW 13.1 (H) 11.7 - 13.0 %    Platelets 364 140 - 500 10*3/mm3    Neutrophil Rel % 64.1 42.7 - 76.0 %    Lymphocyte Rel % 19.8 19.6 - 45.3 %    Monocyte Rel % 8.3 5.0 - 12.0 %    Eosinophil Rel % 7.1 (H) 0.3 - 6.2 %    Basophil Rel % 0.7 0.0 - 1.5 %    Neutrophils Absolute 3.68 1.90 - 8.10 10*3/mm3    Lymphocytes Absolute 1.14 0.90 - 4.80 10*3/mm3    Monocytes Absolute 0.48 0.20 - 1.20 10*3/mm3    Eosinophils Absolute 0.41 0.00 - 0.70 10*3/mm3    Basophils Absolute 0.04 0.00 - 0.20 10*3/mm3    Immature Granulocyte Rel % 0.0 0.0 - 0.5 %    Immature Grans Absolute 0.00 0.00 - 0.03 10*3/mm3     ACE angioedema; on Norvasc    The following portions of the patient's history were reviewed and updated as appropriate: allergies, current medications, past family history, past medical history, past social history, past surgical history and problem list.    Review of Systems   Constitutional: Negative for activity change,  appetite change and unexpected weight change.   HENT: Negative for nosebleeds and trouble swallowing.    Eyes: Negative for pain and visual disturbance.   Respiratory: Negative for chest tightness, shortness of breath and wheezing.    Cardiovascular: Negative for chest pain and palpitations.   Gastrointestinal: Negative for abdominal pain and blood in stool.   Endocrine: Negative.    Genitourinary: Negative for difficulty urinating and hematuria.   Musculoskeletal: Negative for joint swelling.   Skin: Negative for color change and rash.   Allergic/Immunologic: Negative.    Neurological: Negative for syncope and speech difficulty.   Hematological: Negative for adenopathy.   Psychiatric/Behavioral: Positive for decreased concentration. Negative for agitation and confusion.   All other systems reviewed and are negative.      Objective   Physical Exam   Constitutional: She is oriented to person, place, and time. She appears well-developed and well-nourished. No distress.   HENT:   Head: Normocephalic and atraumatic.   Eyes: Conjunctivae and EOM are normal. Pupils are equal, round, and reactive to light. Right eye exhibits no discharge. Left eye exhibits no discharge. No scleral icterus.   Neck: Normal range of motion. Neck supple. No tracheal deviation present. No thyromegaly present.   Cardiovascular: Normal rate, regular rhythm, normal heart sounds, intact distal pulses and normal pulses. Exam reveals no gallop.   No murmur heard.  Pulmonary/Chest: Effort normal and breath sounds normal. No respiratory distress. She has no wheezes. She has no rales.   Musculoskeletal: Normal range of motion.   Neurological: She is alert and oriented to person, place, and time. She exhibits normal muscle tone. Coordination normal.   Skin: Skin is warm. No rash noted. No erythema. No pallor.   Psychiatric: She has a normal mood and affect. Her behavior is normal. Judgment and thought content normal.   Nursing note and vitals  reviewed.      Assessment/Plan   Lissette was seen today for add.    Diagnoses and all orders for this visit:    Essential hypertension  -     Comprehensive metabolic panel  -     Lipid panel  -     CBC and Differential  -     TSH  -     T4, Free  -     T3, Free  -     Vitamin B12  -     Folate  -     Vitamin D 25 Hydroxy    Gastroesophageal reflux disease without esophagitis  -     Comprehensive metabolic panel  -     Lipid panel  -     CBC and Differential  -     TSH  -     T4, Free  -     T3, Free  -     Vitamin B12  -     Folate  -     Vitamin D 25 Hydroxy    Impaired visual perception  -     Comprehensive metabolic panel  -     Lipid panel  -     CBC and Differential  -     TSH  -     T4, Free  -     T3, Free  -     Vitamin B12  -     Folate  -     Vitamin D 25 Hydroxy    Attention deficit disorder (ADD) without hyperactivity  -     Comprehensive metabolic panel  -     Lipid panel  -     CBC and Differential  -     TSH  -     T4, Free  -     T3, Free  -     Vitamin B12  -     Folate  -     Vitamin D 25 Hydroxy    Moderate persistent asthma with acute exacerbation  -     Comprehensive metabolic panel  -     Lipid panel  -     CBC and Differential  -     TSH  -     T4, Free  -     T3, Free  -     Vitamin B12  -     Folate  -     Vitamin D 25 Hydroxy    Acquired hypothyroidism  -     Comprehensive metabolic panel  -     Lipid panel  -     CBC and Differential  -     TSH  -     T4, Free  -     T3, Free  -     Vitamin B12  -     Folate  -     Vitamin D 25 Hydroxy    Laboratory examination ordered as part of a routine general medical examination  -     Comprehensive metabolic panel  -     Lipid panel  -     CBC and Differential  -     TSH  -     T4, Free  -     T3, Free  -     Vitamin B12  -     Folate  -     Vitamin D 25 Hydroxy    Other orders  -     BREO ELLIPTA 100-25 MCG/INH inhaler; Inhale 1 puff Daily. For asthma and rinse mouth after use  -     esomeprazole (nexIUM) 40 MG capsule; Take 1 capsule by mouth Every  Morning Before Breakfast. For GERD  -     levothyroxine (SYNTHROID, LEVOTHROID) 50 MCG tablet; Take 1 tablet by mouth Daily. For thyroid  -     albuterol sulfate HFA (PROAIR HFA) 108 (90 Base) MCG/ACT inhaler; Inhale 2 puffs Every 4 (Four) Hours As Needed for Wheezing.  -     montelukast (SINGULAIR) 10 MG tablet; Take 1 tablet by mouth Every Night for 90 days. For allergy and asthma  -     amLODIPine (NORVASC) 10 MG tablet; Take 1 tablet by mouth Daily. For BP        Refill Adderall for ADD  In summary, Lissette HUNTER Marine, was seen today.  she was seen for  Hypertenision and is well controlled on medication, GERD and is well controlled on PPI medication, Hypothyroidism and here to discuss abnormal lab results, Seasonal allergies and is doing well on their medication PRN, Asthma and is well controlled on medication.  Patient is NOT using rescue Albuterol MDI >=2 times a week and Vitamin D deficiency and will update labs to confirm level is at goal >30,

## 2019-08-19 RX ORDER — DEXTROAMPHETAMINE SACCHARATE, AMPHETAMINE ASPARTATE, DEXTROAMPHETAMINE SULFATE AND AMPHETAMINE SULFATE 7.5; 7.5; 7.5; 7.5 MG/1; MG/1; MG/1; MG/1
30 TABLET ORAL 2 TIMES DAILY
Qty: 60 TABLET | Refills: 0 | Status: SHIPPED | OUTPATIENT
Start: 2019-08-19 | End: 2019-09-21 | Stop reason: SDUPTHER

## 2019-09-22 RX ORDER — DEXTROAMPHETAMINE SACCHARATE, AMPHETAMINE ASPARTATE, DEXTROAMPHETAMINE SULFATE AND AMPHETAMINE SULFATE 7.5; 7.5; 7.5; 7.5 MG/1; MG/1; MG/1; MG/1
30 TABLET ORAL 2 TIMES DAILY
Qty: 60 TABLET | Refills: 0 | Status: SHIPPED | OUTPATIENT
Start: 2019-09-22 | End: 2019-10-16 | Stop reason: SDUPTHER

## 2019-10-16 ENCOUNTER — OFFICE VISIT (OUTPATIENT)
Dept: FAMILY MEDICINE CLINIC | Facility: CLINIC | Age: 44
End: 2019-10-16

## 2019-10-16 VITALS
SYSTOLIC BLOOD PRESSURE: 112 MMHG | OXYGEN SATURATION: 100 % | HEIGHT: 62 IN | TEMPERATURE: 98 F | WEIGHT: 147 LBS | DIASTOLIC BLOOD PRESSURE: 74 MMHG | BODY MASS INDEX: 27.05 KG/M2 | RESPIRATION RATE: 16 BRPM | HEART RATE: 73 BPM

## 2019-10-16 DIAGNOSIS — E03.9 ACQUIRED HYPOTHYROIDISM: ICD-10-CM

## 2019-10-16 DIAGNOSIS — I10 ESSENTIAL HYPERTENSION: Primary | ICD-10-CM

## 2019-10-16 DIAGNOSIS — Z23 IMMUNIZATION DUE: ICD-10-CM

## 2019-10-16 DIAGNOSIS — J45.41 MODERATE PERSISTENT ASTHMA WITH ACUTE EXACERBATION: ICD-10-CM

## 2019-10-16 DIAGNOSIS — F98.8 ATTENTION DEFICIT DISORDER (ADD) WITHOUT HYPERACTIVITY: ICD-10-CM

## 2019-10-16 DIAGNOSIS — K21.9 GASTROESOPHAGEAL REFLUX DISEASE WITHOUT ESOPHAGITIS: ICD-10-CM

## 2019-10-16 PROCEDURE — 90471 IMMUNIZATION ADMIN: CPT | Performed by: PHYSICIAN ASSISTANT

## 2019-10-16 PROCEDURE — 90674 CCIIV4 VAC NO PRSV 0.5 ML IM: CPT | Performed by: PHYSICIAN ASSISTANT

## 2019-10-16 PROCEDURE — 99214 OFFICE O/P EST MOD 30 MIN: CPT | Performed by: PHYSICIAN ASSISTANT

## 2019-10-16 RX ORDER — AMLODIPINE BESYLATE 10 MG/1
5 TABLET ORAL DAILY
Qty: 90 TABLET | Refills: 1
Start: 2019-10-16 | End: 2019-10-23

## 2019-10-16 NOTE — PATIENT INSTRUCTIONS

## 2019-10-16 NOTE — PROGRESS NOTES
"Subjective   Lissette Hawthorne is a 44 y.o. female.     History of Present Illness      Since the last visit, she has overall felt well.  She has Essential Hypertension and well controlled on current medication, GERD controlled on PPI Rx, Hypothyroidism well controlled on current medication and will continue Rx, Seasonal allergies and doing well on their medication , Asthma well controlled and not requiring rescue Albuterol > 2 times a week and Vitamin D deficiency and will update labs for continued management.  she has been compliant with current medications have reviewed them.  The patient denies medication side effects.  Will refill medications. /74 (BP Location: Left arm, Patient Position: Sitting, Cuff Size: Large Adult)   Pulse 73   Temp 98 °F (36.7 °C) (Oral)   Resp 16   Ht 157.5 cm (62\")   Wt 66.7 kg (147 lb)   LMP  (LMP Unknown)   SpO2 100%   BMI 26.89 kg/m²   Lisinopril angioedema  52 lbs lost since Jan----lower Norvasc---watch BP  Results for orders placed or performed in visit on 11/16/18   Hepatic Function Panel (6) ([facility])   Result Value Ref Range    Albumin 4.40 3.50 - 5.20 g/dL    Total Bilirubin 0.4 0.1 - 1.2 mg/dL    Bilirubin, Direct <0.2 0.0 - 0.3 mg/dL    Alkaline Phosphatase 82 39 - 117 U/L    AST (SGOT) 19 1 - 32 U/L    ALT (SGPT) 28 1 - 33 U/L   CBC & Differential   Result Value Ref Range    WBC 5.75 4.50 - 10.70 10*3/mm3    RBC 4.49 3.90 - 5.20 10*6/mm3    Hemoglobin 13.7 11.9 - 15.5 g/dL    Hematocrit 44.3 35.6 - 45.5 %    MCV 98.7 (H) 80.5 - 98.2 fL    MCH 30.5 26.9 - 32.0 pg    MCHC 30.9 (L) 32.4 - 36.3 g/dL    RDW 13.1 (H) 11.7 - 13.0 %    Platelets 364 140 - 500 10*3/mm3    Neutrophil Rel % 64.1 42.7 - 76.0 %    Lymphocyte Rel % 19.8 19.6 - 45.3 %    Monocyte Rel % 8.3 5.0 - 12.0 %    Eosinophil Rel % 7.1 (H) 0.3 - 6.2 %    Basophil Rel % 0.7 0.0 - 1.5 %    Neutrophils Absolute 3.68 1.90 - 8.10 10*3/mm3    Lymphocytes Absolute 1.14 0.90 - 4.80 10*3/mm3    Monocytes " Absolute 0.48 0.20 - 1.20 10*3/mm3    Eosinophils Absolute 0.41 0.00 - 0.70 10*3/mm3    Basophils Absolute 0.04 0.00 - 0.20 10*3/mm3    Immature Granulocyte Rel % 0.0 0.0 - 0.5 %    Immature Grans Absolute 0.00 0.00 - 0.03 10*3/mm3     [facility] and  neuropsych eval 7-1-15  Lissette Hawthorne 44 y.o. female who presents for ADD/ADHD follow up. They are well controlled on their current Rx.  No new problems with insomnia, tachycardia,or weight loss. The patient has read and signed the [facility] Controlled Substance Contract.  I will continue to see patient for regular follow up appointments and give the lowest effective dose.  They are well controlled on their medication at the lowest effective dose.  She has a previous history of a diagnosis of  ADD/ADHD.   ASIF has been reviewed by me and is updated every 3 months. The patient is aware of the potential for addiction and dependence.  The Rx continues to help them concentrate, finish tasks in timely manor, and succeed.  She denies current suicidal and homicidal ideation.  Reviewed causes for potential of discontinuation of stimulant medication,  including but not limited to consideration for diversion, obtaining other controlled substances from other license practitioners, or  inappropriate office behavior.  Patient understands to use a controlled substance as prescribed by physician and to avoid improper use of controlled substances.  Patient will also verbalized understanding that prescriptions to be filled at the same pharmacy  unless office staff is made aware of this.  Patient understands they can be submitted to random urine drug screens and/or random pill counts on any request.  Patient understands they are not to receive early refills.  The patient must produce an official police report for any effort to replace controlled substances that are lost or stolen.  No use of illegal street drugs while receiving controlled substances from this prescribing  provider.  The patient is to take medication as prescribed  and not deviate from the normal schedule without consultation from the provider.  Patient is not to share the medication with others or to take medication with alcohol or other sedatives.  Use caution when driving or operating machinery.  Must always keep the prescription in the original container.  Patient is to store controlled substances in a locked cabinet or other secure storage unit that is cool, dry and out of sunlight.  Patient must immediately notify office if this controlled substances is  improperly taken by another individual.  Reviewed risk for physical dependence, tolerance and addiction.    There is no evidence of aberrant behavior or any red flags.    File the Adderall; due on 22nd    Off anticoag  Takes Aleve often; bruising but some better now; got labs today; hyst; diet changed; see GI; if low hgb will need iron profile; did this in 2018 and fine  I have her on Breo    The following portions of the patient's history were reviewed and updated as appropriate: allergies, current medications, past family history, past medical history, past social history, past surgical history and problem list.    Review of Systems   Constitutional: Negative for activity change, appetite change and unexpected weight change.   HENT: Negative for nosebleeds and trouble swallowing.    Eyes: Negative for pain and visual disturbance.   Respiratory: Negative for chest tightness, shortness of breath and wheezing.    Cardiovascular: Negative for chest pain and palpitations.   Gastrointestinal: Negative for abdominal pain and blood in stool.   Endocrine: Negative.    Genitourinary: Negative for difficulty urinating and hematuria.   Musculoskeletal: Negative for joint swelling.   Skin: Negative for color change and rash.   Allergic/Immunologic: Negative.    Neurological: Negative for syncope and speech difficulty.   Hematological: Negative for adenopathy.    Psychiatric/Behavioral: Positive for decreased concentration. Negative for agitation and confusion.   All other systems reviewed and are negative.      Objective   Physical Exam   Constitutional: She is oriented to person, place, and time. She appears well-developed and well-nourished. No distress.   HENT:   Head: Normocephalic and atraumatic.   Eyes: Conjunctivae and EOM are normal. Pupils are equal, round, and reactive to light. Right eye exhibits no discharge. Left eye exhibits no discharge. No scleral icterus.   Neck: Normal range of motion. Neck supple. No tracheal deviation present. No thyromegaly present.   Cardiovascular: Normal rate, regular rhythm, normal heart sounds, intact distal pulses and normal pulses. Exam reveals no gallop.   No murmur heard.  Pulmonary/Chest: Effort normal and breath sounds normal. No respiratory distress. She has no wheezes. She has no rales.   Musculoskeletal: Normal range of motion.   Neurological: She is alert and oriented to person, place, and time. She exhibits normal muscle tone. Coordination normal.   Skin: Skin is warm. No rash noted. No erythema. No pallor.   Psychiatric: She has a normal mood and affect. Her behavior is normal. Judgment and thought content normal.   Nursing note and vitals reviewed.      Assessment/Plan   Lissette was seen today for hypertension.    Diagnoses and all orders for this visit:    Essential hypertension    Immunization due  -     Flucelvax Quad=>4Years (PFS)    Attention deficit disorder (ADD) without hyperactivity    Moderate persistent asthma with acute exacerbation    Gastroesophageal reflux disease without esophagitis    Acquired hypothyroidism    Other orders  -     amLODIPine (NORVASC) 10 MG tablet; Take 0.5 tablets by mouth Daily. For BP; new dose      Plan, Lissette Hawthorne, was seen today.  she was seen for HTN and continue medication, GERD and will continue on PPI medication, Hypothyroidism well controlled, continue medication,  Seasonal allergies and is doing well on their medication PRN, Asthma and is well controlled on medication.   and Vitamin D deficiency and will update labs .  Great job on weight loss; go down on Norvasc to 5mg and watch bp  Refill Adderall and file  Labs done today  Work up if bruising worse; watch Aleve.

## 2019-10-17 LAB
25(OH)D3+25(OH)D2 SERPL-MCNC: 35.3 NG/ML (ref 30–100)
ALBUMIN SERPL-MCNC: 5.3 G/DL (ref 3.5–5.2)
ALBUMIN/GLOB SERPL: 2 G/DL
ALP SERPL-CCNC: 64 U/L (ref 39–117)
ALT SERPL-CCNC: 21 U/L (ref 1–33)
AST SERPL-CCNC: 13 U/L (ref 1–32)
BASOPHILS # BLD AUTO: 0.05 10*3/MM3 (ref 0–0.2)
BASOPHILS NFR BLD AUTO: 0.8 % (ref 0–1.5)
BILIRUB SERPL-MCNC: 0.6 MG/DL (ref 0.2–1.2)
BUN SERPL-MCNC: 18 MG/DL (ref 6–20)
BUN/CREAT SERPL: 24 (ref 7–25)
CALCIUM SERPL-MCNC: 9.7 MG/DL (ref 8.6–10.5)
CHLORIDE SERPL-SCNC: 100 MMOL/L (ref 98–107)
CHOLEST SERPL-MCNC: 202 MG/DL (ref 0–200)
CO2 SERPL-SCNC: 24.6 MMOL/L (ref 22–29)
CREAT SERPL-MCNC: 0.75 MG/DL (ref 0.57–1)
EOSINOPHIL # BLD AUTO: 0.08 10*3/MM3 (ref 0–0.4)
EOSINOPHIL NFR BLD AUTO: 1.2 % (ref 0.3–6.2)
ERYTHROCYTE [DISTWIDTH] IN BLOOD BY AUTOMATED COUNT: 12.1 % (ref 12.3–15.4)
FOLATE SERPL-MCNC: 10.7 NG/ML (ref 4.78–24.2)
GLOBULIN SER CALC-MCNC: 2.6 GM/DL
GLUCOSE SERPL-MCNC: 84 MG/DL (ref 65–99)
HCT VFR BLD AUTO: 40.7 % (ref 34–46.6)
HDLC SERPL-MCNC: 87 MG/DL (ref 40–60)
HGB BLD-MCNC: 14.2 G/DL (ref 12–15.9)
IMM GRANULOCYTES # BLD AUTO: 0.02 10*3/MM3 (ref 0–0.05)
IMM GRANULOCYTES NFR BLD AUTO: 0.3 % (ref 0–0.5)
LDLC SERPL CALC-MCNC: 105 MG/DL (ref 0–100)
LYMPHOCYTES # BLD AUTO: 1.29 10*3/MM3 (ref 0.7–3.1)
LYMPHOCYTES NFR BLD AUTO: 19.9 % (ref 19.6–45.3)
MCH RBC QN AUTO: 32.2 PG (ref 26.6–33)
MCHC RBC AUTO-ENTMCNC: 34.9 G/DL (ref 31.5–35.7)
MCV RBC AUTO: 92.3 FL (ref 79–97)
MONOCYTES # BLD AUTO: 0.4 10*3/MM3 (ref 0.1–0.9)
MONOCYTES NFR BLD AUTO: 6.2 % (ref 5–12)
NEUTROPHILS # BLD AUTO: 4.64 10*3/MM3 (ref 1.7–7)
NEUTROPHILS NFR BLD AUTO: 71.6 % (ref 42.7–76)
NRBC BLD AUTO-RTO: 0 /100 WBC (ref 0–0.2)
PLATELET # BLD AUTO: 378 10*3/MM3 (ref 140–450)
POTASSIUM SERPL-SCNC: 4.2 MMOL/L (ref 3.5–5.2)
PROT SERPL-MCNC: 7.9 G/DL (ref 6–8.5)
RBC # BLD AUTO: 4.41 10*6/MM3 (ref 3.77–5.28)
SODIUM SERPL-SCNC: 138 MMOL/L (ref 136–145)
T3FREE SERPL-MCNC: 2.4 PG/ML (ref 2–4.4)
T4 FREE SERPL-MCNC: 1.19 NG/DL (ref 0.93–1.7)
TRIGL SERPL-MCNC: 49 MG/DL (ref 0–150)
TSH SERPL DL<=0.005 MIU/L-ACNC: 2.65 UIU/ML (ref 0.27–4.2)
VIT B12 SERPL-MCNC: 394 PG/ML (ref 211–946)
VLDLC SERPL CALC-MCNC: 9.8 MG/DL
WBC # BLD AUTO: 6.48 10*3/MM3 (ref 3.4–10.8)

## 2019-10-17 RX ORDER — DEXTROAMPHETAMINE SACCHARATE, AMPHETAMINE ASPARTATE, DEXTROAMPHETAMINE SULFATE AND AMPHETAMINE SULFATE 7.5; 7.5; 7.5; 7.5 MG/1; MG/1; MG/1; MG/1
30 TABLET ORAL 2 TIMES DAILY
Qty: 60 TABLET | Refills: 0 | Status: SHIPPED | OUTPATIENT
Start: 2019-10-17 | End: 2019-11-22 | Stop reason: SDUPTHER

## 2019-10-23 RX ORDER — AMLODIPINE BESYLATE 5 MG/1
5 TABLET ORAL DAILY
Qty: 90 TABLET | Refills: 1 | Status: SHIPPED | OUTPATIENT
Start: 2019-10-23 | End: 2020-01-22 | Stop reason: SDUPTHER

## 2019-11-07 ENCOUNTER — APPOINTMENT (OUTPATIENT)
Dept: WOMENS IMAGING | Facility: HOSPITAL | Age: 44
End: 2019-11-07

## 2019-11-07 PROCEDURE — 77067 SCR MAMMO BI INCL CAD: CPT | Performed by: RADIOLOGY

## 2019-11-07 PROCEDURE — 77063 BREAST TOMOSYNTHESIS BI: CPT | Performed by: RADIOLOGY

## 2019-11-12 RX ORDER — ALBUTEROL SULFATE 2.5 MG/3ML
2.5 SOLUTION RESPIRATORY (INHALATION) EVERY 4 HOURS PRN
Qty: 120 VIAL | Refills: 12 | Status: SHIPPED | OUTPATIENT
Start: 2019-11-12 | End: 2020-07-31 | Stop reason: SDUPTHER

## 2019-11-22 RX ORDER — DEXTROAMPHETAMINE SACCHARATE, AMPHETAMINE ASPARTATE, DEXTROAMPHETAMINE SULFATE AND AMPHETAMINE SULFATE 7.5; 7.5; 7.5; 7.5 MG/1; MG/1; MG/1; MG/1
30 TABLET ORAL 2 TIMES DAILY
Qty: 60 TABLET | Refills: 0 | Status: SHIPPED | OUTPATIENT
Start: 2019-11-22 | End: 2019-12-23 | Stop reason: SDUPTHER

## 2019-11-22 NOTE — TELEPHONE ENCOUNTER
Regarding: Prescription Question  Contact: [phone]  ----- Message from [employer], Generic sent at 11/22/2019  8:05 AM EST -----    Good Morning Trang,    I only have 2 days left of my Adderall can you please send my prescription over to [facility]?    Thank you!  Arina Ibanez

## 2019-12-17 ENCOUNTER — OFFICE VISIT (OUTPATIENT)
Dept: RETAIL CLINIC | Facility: CLINIC | Age: 44
End: 2019-12-17

## 2019-12-17 VITALS
TEMPERATURE: 98.5 F | DIASTOLIC BLOOD PRESSURE: 80 MMHG | RESPIRATION RATE: 18 BRPM | SYSTOLIC BLOOD PRESSURE: 120 MMHG | HEART RATE: 90 BPM | OXYGEN SATURATION: 98 %

## 2019-12-17 DIAGNOSIS — J06.9 ACUTE URI: ICD-10-CM

## 2019-12-17 DIAGNOSIS — H66.013 NON-RECURRENT ACUTE SUPPURATIVE OTITIS MEDIA OF BOTH EARS WITH SPONTANEOUS RUPTURE OF TYMPANIC MEMBRANES: Primary | ICD-10-CM

## 2019-12-17 PROCEDURE — 99213 OFFICE O/P EST LOW 20 MIN: CPT | Performed by: NURSE PRACTITIONER

## 2019-12-17 RX ORDER — METHYLPREDNISOLONE 4 MG/1
TABLET ORAL
Qty: 21 TABLET | Refills: 0 | Status: SHIPPED | OUTPATIENT
Start: 2019-12-17 | End: 2020-01-06

## 2019-12-17 RX ORDER — AZITHROMYCIN 250 MG/1
TABLET, FILM COATED ORAL
Qty: 6 TABLET | Refills: 0 | Status: SHIPPED | OUTPATIENT
Start: 2019-12-17 | End: 2020-01-22

## 2019-12-23 RX ORDER — DEXTROAMPHETAMINE SACCHARATE, AMPHETAMINE ASPARTATE, DEXTROAMPHETAMINE SULFATE AND AMPHETAMINE SULFATE 7.5; 7.5; 7.5; 7.5 MG/1; MG/1; MG/1; MG/1
30 TABLET ORAL 2 TIMES DAILY
Qty: 60 TABLET | Refills: 0 | Status: SHIPPED | OUTPATIENT
Start: 2019-12-23 | End: 2020-01-22 | Stop reason: SDUPTHER

## 2019-12-23 NOTE — TELEPHONE ENCOUNTER
----- Message from Lissette Hawthorne sent at 12/23/2019  9:39 AM EST -----  Regarding: Prescription Question  Contact: [phone]  Good Morning Trang,    I have 2 days left of my Adderall can you get a prescription ready and send over to [facility] so I can pick it up on the 27th?     Thank you.   Arina Ibanez

## 2020-01-06 ENCOUNTER — OFFICE VISIT (OUTPATIENT)
Dept: RETAIL CLINIC | Facility: CLINIC | Age: 45
End: 2020-01-06

## 2020-01-06 VITALS
SYSTOLIC BLOOD PRESSURE: 125 MMHG | TEMPERATURE: 98.7 F | DIASTOLIC BLOOD PRESSURE: 80 MMHG | OXYGEN SATURATION: 97 % | HEART RATE: 94 BPM

## 2020-01-06 DIAGNOSIS — J98.8 VIRAL RESPIRATORY ILLNESS: Primary | ICD-10-CM

## 2020-01-06 DIAGNOSIS — B97.89 VIRAL RESPIRATORY ILLNESS: Primary | ICD-10-CM

## 2020-01-06 DIAGNOSIS — H65.93 BILATERAL OTITIS MEDIA WITH EFFUSION: ICD-10-CM

## 2020-01-06 PROCEDURE — 99213 OFFICE O/P EST LOW 20 MIN: CPT | Performed by: NURSE PRACTITIONER

## 2020-01-06 RX ORDER — METHYLPREDNISOLONE 4 MG/1
TABLET ORAL
Qty: 21 TABLET | Refills: 0 | Status: SHIPPED | OUTPATIENT
Start: 2020-01-06 | End: 2020-04-23

## 2020-01-06 NOTE — PROGRESS NOTES
Subjective   Lissette Hawthorne is a 44 y.o. female.     URI    This is a new problem. The current episode started in the past 7 days (2 days). The problem has been gradually worsening. There has been no fever ( at highest). Associated symptoms include congestion, coughing (intermittently productive), ear pain (left ear), rhinorrhea, sneezing, a sore throat and wheezing. Pertinent negatives include no headaches. She has tried NSAIDs and antihistamine for the symptoms. The treatment provided mild relief.        The following portions of the patient's history were reviewed and updated as appropriate: allergies, current medications, past family history, past medical history, past social history, past surgical history and problem list.    Review of Systems   Constitutional: Positive for fatigue. Negative for fever.   HENT: Positive for congestion, ear pain (left ear), postnasal drip, rhinorrhea, sneezing and sore throat. Negative for sinus pressure.    Respiratory: Positive for cough (intermittently productive) and wheezing. Negative for chest tightness and shortness of breath.    Cardiovascular: Negative.    Gastrointestinal: Negative.    Musculoskeletal: Positive for myalgias.       Objective   Physical Exam   Constitutional: She is oriented to person, place, and time. She appears well-developed.   HENT:   Head: Normocephalic.   Right Ear: Ear canal normal. Tympanic membrane is scarred. A middle ear effusion is present.   Left Ear: Ear canal normal. Tympanic membrane is scarred and bulging. A middle ear effusion is present.   Nose: Congestion present. Right sinus exhibits no maxillary sinus tenderness and no frontal sinus tenderness. Left sinus exhibits no maxillary sinus tenderness and no frontal sinus tenderness.   Mouth/Throat: Uvula is midline, oropharynx is clear and moist and mucous membranes are normal. No tonsillar exudate.   Cardiovascular: Normal rate, regular rhythm and normal heart sounds.    Pulmonary/Chest: Effort normal and breath sounds normal.   Lymphadenopathy:     She has no cervical adenopathy.   Neurological: She is alert and oriented to person, place, and time.   Skin: Skin is warm and dry.   Psychiatric: She has a normal mood and affect.     Vitals:    01/06/20 1128   BP: 125/80   Pulse: 94   Temp: 98.7 °F (37.1 °C)   SpO2: 97%     Pt decline flu swab.    Assessment/Plan   Lissette was seen today for uri.    Diagnoses and all orders for this visit:    Viral respiratory illness  -     methylPREDNISolone (MEDROL, WAQAR,) 4 MG tablet; Take as directed on package instructions.    Bilateral otitis media with effusion      Pt to take antihistamine and OTC medication for cough/cold symptoms.  Follow up with PCP if symptoms worsen or fail to improve as anticipated.    Viral Respiratory Infection  A respiratory infection is an illness that affects part of the respiratory system, such as the lungs, nose, or throat. A respiratory infection that is caused by a virus is called a viral respiratory infection.  Common types of viral respiratory infections include:  · A cold.  · The flu (influenza).  · A respiratory syncytial virus (RSV) infection.  What are the causes?  This condition is caused by a virus.  What are the signs or symptoms?  Symptoms of this condition include:  · A stuffy or runny nose.  · Yellow or green nasal discharge.  · A cough.  · Sneezing.  · Fatigue.  · Achy muscles.  · A sore throat.  · Sweating or chills.  · A fever.  · A headache.  How is this diagnosed?  This condition may be diagnosed based on:  · Your symptoms.  · A physical exam.  · Testing of nasal swabs.  How is this treated?  This condition may be treated with medicines, such as:  · Antiviral medicine. This may shorten the length of time a person has symptoms.  · Expectorants. These make it easier to cough up mucus.  · Decongestant nasal sprays.  · Acetaminophen or NSAIDs to relieve fever and pain.  Antibiotic medicines are not  prescribed for viral infections. This is because antibiotics are designed to kill bacteria. They are not effective against viruses.  Follow these instructions at home:    Managing pain and congestion  · Take over-the-counter and prescription medicines only as told by your health care provider.  · If you have a sore throat, gargle with a salt-water mixture 3-4 times a day or as needed. To make a salt-water mixture, completely dissolve ½-1 tsp of salt in 1 cup of warm water.  · Use nose drops made from salt water to ease congestion and soften raw skin around your nose.  · Drink enough fluid to keep your urine pale yellow. This helps prevent dehydration and helps loosen up mucus.  General instructions  · Rest as much as possible.  · Do not drink alcohol.  · Do not use any products that contain nicotine or tobacco, such as cigarettes and e-cigarettes. If you need help quitting, ask your health care provider.  · Keep all follow-up visits as told by your health care provider. This is important.  How is this prevented?    · Get an annual flu shot. You may get the flu shot in late summer, fall, or winter. Ask your health care provider when you should get your flu shot.  · Avoid exposing others to your respiratory infection.  ? Stay home from work or school as told by your health care provider.  ? Wash your hands with soap and water often, especially after you cough or sneeze. If soap and water are not available, use alcohol-based hand .  · Avoid contact with people who are sick during cold and flu season. This is generally fall and winter.  Contact a health care provider if:  · Your symptoms last for 10 days or longer.  · Your symptoms get worse over time.  · You have a fever.  · You have severe sinus pain in your face or forehead.  · The glands in your jaw or neck become very swollen.  Get help right away if you:  · Feel pain or pressure in your chest.  · Have shortness of breath.  · Faint or feel like you will  faint.  · Have severe and persistent vomiting.  · Feel confused or disoriented.  Summary  · A respiratory infection is an illness that affects part of the respiratory system, such as the lungs, nose, or throat. A respiratory infection that is caused by a virus is called a viral respiratory infection.  · Common types of viral respiratory infections are a cold, influenza, and respiratory syncytial virus (RSV) infection.  · Symptoms of this condition include a stuffy or runny nose, cough, sneezing, fatigue, achy muscles, sore throat, and fevers or chills.  · Antibiotic medicines are not prescribed for viral infections. This is because antibiotics are designed to kill bacteria. They are not effective against viruses.  This information is not intended to replace advice given to you by your health care provider. Make sure you discuss any questions you have with your health care provider.  Document Released: 09/27/2006 Document Revised: 01/28/2019 Document Reviewed: 01/28/2019  [employer] Patient Education © 2019 [employer]

## 2020-01-22 ENCOUNTER — OFFICE VISIT (OUTPATIENT)
Dept: FAMILY MEDICINE CLINIC | Facility: CLINIC | Age: 45
End: 2020-01-22

## 2020-01-22 VITALS
SYSTOLIC BLOOD PRESSURE: 130 MMHG | TEMPERATURE: 98 F | HEART RATE: 85 BPM | RESPIRATION RATE: 16 BRPM | BODY MASS INDEX: 27.05 KG/M2 | DIASTOLIC BLOOD PRESSURE: 72 MMHG | WEIGHT: 147 LBS | OXYGEN SATURATION: 100 % | HEIGHT: 62 IN

## 2020-01-22 DIAGNOSIS — K21.9 GASTROESOPHAGEAL REFLUX DISEASE WITHOUT ESOPHAGITIS: ICD-10-CM

## 2020-01-22 DIAGNOSIS — J45.40 MODERATE PERSISTENT ASTHMA WITHOUT COMPLICATION: ICD-10-CM

## 2020-01-22 DIAGNOSIS — I10 ESSENTIAL HYPERTENSION: Primary | ICD-10-CM

## 2020-01-22 DIAGNOSIS — F98.8 ATTENTION DEFICIT DISORDER (ADD) WITHOUT HYPERACTIVITY: ICD-10-CM

## 2020-01-22 PROCEDURE — 99214 OFFICE O/P EST MOD 30 MIN: CPT | Performed by: PHYSICIAN ASSISTANT

## 2020-01-22 RX ORDER — AMLODIPINE BESYLATE 5 MG/1
5 TABLET ORAL DAILY
Qty: 90 TABLET | Refills: 1 | Status: SHIPPED | OUTPATIENT
Start: 2020-01-22 | End: 2020-04-23 | Stop reason: SDUPTHER

## 2020-01-22 RX ORDER — DEXTROAMPHETAMINE SACCHARATE, AMPHETAMINE ASPARTATE, DEXTROAMPHETAMINE SULFATE AND AMPHETAMINE SULFATE 7.5; 7.5; 7.5; 7.5 MG/1; MG/1; MG/1; MG/1
30 TABLET ORAL 2 TIMES DAILY
Qty: 60 TABLET | Refills: 0 | Status: SHIPPED | OUTPATIENT
Start: 2020-01-22 | End: 2020-02-24 | Stop reason: SDUPTHER

## 2020-01-22 NOTE — PROGRESS NOTES
"Danielle Hawthorne is a 44 y.o. female.     History of Present Illness    Since the last visit, she has overall felt well.  She has Essential Hypertension and well controlled on current medication, GERD controlled on PPI Rx, Hyperlipidemia and working on this with diet and exercise, Seasonal allergies and doing well on their medication , Asthma well controlled and not requiring rescue Albuterol > 2 times a week, Vitamin D deficiency and labs are at goal >30 ng/mL and Past history of acquired hypothyroidism and has not been on her medication for months prior to last October labs were in range will not restart at this time.  she has been compliant with current medications have reviewed them.  The patient denies medication side effects.  Will refill medications. /72 (BP Location: Left arm, Patient Position: Sitting, Cuff Size: Adult)   Pulse 85   Temp 98 °F (36.7 °C) (Oral)   Resp 16   Ht 157.5 cm (62\")   Wt 66.7 kg (147 lb)   LMP  (LMP Unknown)   SpO2 100%   BMI 26.89 kg/m²   Weight down 65 lbs  Results for orders placed or performed in visit on 10/16/19   Comprehensive Metabolic Panel   Result Value Ref Range    Glucose 84 65 - 99 mg/dL    BUN 18 6 - 20 mg/dL    Creatinine 0.75 0.57 - 1.00 mg/dL    eGFR Non African Am 84 >60 mL/min/1.73    eGFR African Am 101 >60 mL/min/1.73    BUN/Creatinine Ratio 24.0 7.0 - 25.0    Sodium 138 136 - 145 mmol/L    Potassium 4.2 3.5 - 5.2 mmol/L    Chloride 100 98 - 107 mmol/L    Total CO2 24.6 22.0 - 29.0 mmol/L    Calcium 9.7 8.6 - 10.5 mg/dL    Total Protein 7.9 6.0 - 8.5 g/dL    Albumin 5.30 (H) 3.50 - 5.20 g/dL    Globulin 2.6 gm/dL    A/G Ratio 2.0 g/dL    Total Bilirubin 0.6 0.2 - 1.2 mg/dL    Alkaline Phosphatase 64 39 - 117 U/L    AST (SGOT) 13 1 - 32 U/L    ALT (SGPT) 21 1 - 33 U/L   Lipid Panel   Result Value Ref Range    Total Cholesterol 202 (H) 0 - 200 mg/dL    Triglycerides 49 0 - 150 mg/dL    HDL Cholesterol 87 (H) 40 - 60 mg/dL    VLDL " Cholesterol 9.8 mg/dL    LDL Cholesterol  105 (H) 0 - 100 mg/dL   T4, Free   Result Value Ref Range    Free T4 1.19 0.93 - 1.70 ng/dL   Folate   Result Value Ref Range    Folate 10.70 4.78 - 24.20 ng/mL   TSH   Result Value Ref Range    TSH 2.650 0.270 - 4.200 uIU/mL   Vitamin D 25 Hydroxy   Result Value Ref Range    25 Hydroxy, Vitamin D 35.3 30.0 - 100.0 ng/ml   Vitamin B12   Result Value Ref Range    Vitamin B-12 394 211 - 946 pg/mL   T3, Free   Result Value Ref Range    T3, Free 2.4 2.0 - 4.4 pg/mL   CBC & Differential   Result Value Ref Range    WBC 6.48 3.40 - 10.80 10*3/mm3    RBC 4.41 3.77 - 5.28 10*6/mm3    Hemoglobin 14.2 12.0 - 15.9 g/dL    Hematocrit 40.7 34.0 - 46.6 %    MCV 92.3 79.0 - 97.0 fL    MCH 32.2 26.6 - 33.0 pg    MCHC 34.9 31.5 - 35.7 g/dL    RDW 12.1 (L) 12.3 - 15.4 %    Platelets 378 140 - 450 10*3/mm3    Neutrophil Rel % 71.6 42.7 - 76.0 %    Lymphocyte Rel % 19.9 19.6 - 45.3 %    Monocyte Rel % 6.2 5.0 - 12.0 %    Eosinophil Rel % 1.2 0.3 - 6.2 %    Basophil Rel % 0.8 0.0 - 1.5 %    Neutrophils Absolute 4.64 1.70 - 7.00 10*3/mm3    Lymphocytes Absolute 1.29 0.70 - 3.10 10*3/mm3    Monocytes Absolute 0.40 0.10 - 0.90 10*3/mm3    Eosinophils Absolute 0.08 0.00 - 0.40 10*3/mm3    Basophils Absolute 0.05 0.00 - 0.20 10*3/mm3    Immature Granulocyte Rel % 0.3 0.0 - 0.5 %    Immature Grans Absolute 0.02 0.00 - 0.05 10*3/mm3    nRBC 0.0 0.0 - 0.2 /100 WBC   Aleve if migraine  On asthma Rx and works well  Labs done Oct  Added B12  Failed H2 blocker    [facility] neuropsych eval 7-1-15  Lissette Hawthorne 44 y.o. female who presents for ADD/ADHD follow up. They are well controlled on their current Rx.  No new problems with insomnia, tachycardia,or weight loss. The patient has read and signed the Psychiatric Controlled Substance Contract.  I will continue to see patient for regular follow up appointments and give the lowest effective dose.  They are well controlled on their medication at  the lowest effective dose.  She has a previous history of a diagnosis of  ADD/ADHD.   ASIF has been reviewed by me and is updated every 3 months. The patient is aware of the potential for addiction and dependence.  The Rx continues to help them concentrate, finish tasks in timely manor, and succeed.  She denies current suicidal and homicidal ideation.  Reviewed causes for potential of discontinuation of stimulant medication,  including but not limited to consideration for diversion, obtaining other controlled substances from other license practitioners, or  inappropriate office behavior.  Patient understands to use a controlled substance as prescribed by physician and to avoid improper use of controlled substances.  Patient will also verbalized understanding that prescriptions to be filled at the same pharmacy  unless office staff is made aware of this.  Patient understands they can be submitted to random urine drug screens and/or random pill counts on any request.  Patient understands they are not to receive early refills.  The patient must produce an official police report for any effort to replace controlled substances that are lost or stolen.  No use of illegal street drugs while receiving controlled substances from this prescribing provider.  The patient is to take medication as prescribed  and not deviate from the normal schedule without consultation from the provider.  Patient is not to share the medication with others or to take medication with alcohol or other sedatives.  Use caution when driving or operating machinery.  Must always keep the prescription in the original container.  Patient is to store controlled substances in a locked cabinet or other secure storage unit that is cool, dry and out of sunlight.  Patient must immediately notify office if this controlled substances is  improperly taken by another individual.  Reviewed risk for physical dependence, tolerance and addiction.    There is no  evidence of aberrant behavior or any red flags    ACE angioedema; on Norvasc     The following portions of the patient's history were reviewed and updated as appropriate: allergies, current medications, past family history, past medical history, past social history, past surgical history and problem list.    Review of Systems   Constitutional: Negative for activity change, appetite change and unexpected weight change.   HENT: Positive for ear pain. Negative for nosebleeds and trouble swallowing.    Eyes: Negative for pain and visual disturbance.   Respiratory: Negative for chest tightness, shortness of breath and wheezing.    Cardiovascular: Negative for chest pain and palpitations.   Gastrointestinal: Negative for abdominal pain and blood in stool.   Endocrine: Negative.    Genitourinary: Negative for difficulty urinating and hematuria.   Musculoskeletal: Negative for joint swelling.   Skin: Negative for color change and rash.   Allergic/Immunologic: Negative.    Neurological: Negative for syncope and speech difficulty.   Hematological: Negative for adenopathy.   Psychiatric/Behavioral: Positive for decreased concentration. Negative for agitation and confusion.   All other systems reviewed and are negative.      Objective   Physical Exam   Constitutional: She is oriented to person, place, and time. She appears well-developed and well-nourished. No distress.   HENT:   Head: Normocephalic and atraumatic.   Scarring in trace fluid left eardrum   Eyes: Pupils are equal, round, and reactive to light. Conjunctivae and EOM are normal. Right eye exhibits no discharge. Left eye exhibits no discharge. No scleral icterus.   Neck: Normal range of motion. Neck supple. No tracheal deviation present. No thyromegaly present.   Cardiovascular: Normal rate, regular rhythm, normal heart sounds, intact distal pulses and normal pulses. Exam reveals no gallop.   No murmur heard.  Pulmonary/Chest: Effort normal and breath sounds normal.  No respiratory distress. She has no wheezes. She has no rales.   Musculoskeletal: Normal range of motion.   Lymphadenopathy:     She has no cervical adenopathy.   Neurological: She is alert and oriented to person, place, and time. She exhibits normal muscle tone. Coordination normal.   Skin: Skin is warm. No rash noted. No erythema. No pallor.   Psychiatric: She has a normal mood and affect. Her behavior is normal. Judgment and thought content normal.   Nursing note and vitals reviewed.      Assessment/Plan   Lissette was seen today for ear fullness.    Diagnoses and all orders for this visit:    Essential hypertension    Attention deficit disorder (ADD) without hyperactivity    Gastroesophageal reflux disease without esophagitis    Moderate persistent asthma without complication    Hypothyroidism, acquired      Plan, Lissette HUNTER JOBYtravis, was seen today.  she was seen for HTN and continue medication, GERD and will continue on PPI medication, Hyperlipidemia and will work on this with diet and exercise, Seasonal allergies and is doing well on their medication PRN, Asthma and is well controlled on medication.   and Vitamin D deficiency and supplemented.  Cont. Adderall  Labs Oct             Answers for HPI/ROS submitted by the patient on 1/22/2020   How long have you been having these symptoms?: 1-4 weeks ago

## 2020-02-24 RX ORDER — DEXTROAMPHETAMINE SACCHARATE, AMPHETAMINE ASPARTATE, DEXTROAMPHETAMINE SULFATE AND AMPHETAMINE SULFATE 7.5; 7.5; 7.5; 7.5 MG/1; MG/1; MG/1; MG/1
30 TABLET ORAL 2 TIMES DAILY
Qty: 60 TABLET | Refills: 0 | Status: SHIPPED | OUTPATIENT
Start: 2020-02-24 | End: 2020-03-25 | Stop reason: SDUPTHER

## 2020-03-26 RX ORDER — DEXTROAMPHETAMINE SACCHARATE, AMPHETAMINE ASPARTATE, DEXTROAMPHETAMINE SULFATE AND AMPHETAMINE SULFATE 7.5; 7.5; 7.5; 7.5 MG/1; MG/1; MG/1; MG/1
30 TABLET ORAL 2 TIMES DAILY
Qty: 60 TABLET | Refills: 0 | Status: SHIPPED | OUTPATIENT
Start: 2020-03-26 | End: 2020-04-23 | Stop reason: SDUPTHER

## 2020-04-23 ENCOUNTER — TELEMEDICINE (OUTPATIENT)
Dept: FAMILY MEDICINE CLINIC | Facility: CLINIC | Age: 45
End: 2020-04-23

## 2020-04-23 DIAGNOSIS — I10 ESSENTIAL HYPERTENSION: ICD-10-CM

## 2020-04-23 DIAGNOSIS — F98.8 ATTENTION DEFICIT DISORDER (ADD) WITHOUT HYPERACTIVITY: Primary | ICD-10-CM

## 2020-04-23 PROBLEM — E03.9 HYPOTHYROIDISM, ACQUIRED: Status: RESOLVED | Noted: 2017-10-10 | Resolved: 2020-04-23

## 2020-04-23 PROCEDURE — 99213 OFFICE O/P EST LOW 20 MIN: CPT | Performed by: PHYSICIAN ASSISTANT

## 2020-04-23 RX ORDER — DEXTROAMPHETAMINE SACCHARATE, AMPHETAMINE ASPARTATE, DEXTROAMPHETAMINE SULFATE AND AMPHETAMINE SULFATE 7.5; 7.5; 7.5; 7.5 MG/1; MG/1; MG/1; MG/1
30 TABLET ORAL 2 TIMES DAILY
Qty: 60 TABLET | Refills: 0 | Status: SHIPPED | OUTPATIENT
Start: 2020-04-23 | End: 2020-05-26 | Stop reason: SDUPTHER

## 2020-04-23 RX ORDER — AMLODIPINE BESYLATE 5 MG/1
5 TABLET ORAL DAILY
Qty: 90 TABLET | Refills: 0 | Status: SHIPPED | OUTPATIENT
Start: 2020-04-23 | End: 2020-07-31 | Stop reason: SDUPTHER

## 2020-04-23 NOTE — PROGRESS NOTES
Subjective   Lissette Hawthorne is a 45 y.o. female.   You have chosen to receive care through a telehealth visit.  Do you consent to use a video/audio connection for your medical care today? Yes    History of Present Illness   Lissette Hawthorne 45 y.o. female who presents for ADD/ADHD follow up. They are well controlled on their current Rx.  No new problems with insomnia, tachycardia,or weight loss. The patient has read and signed the [facility] Controlled Substance Contract.  I will continue to see patient for regular follow up appointments and give the lowest effective dose.  They are well controlled on their medication at the lowest effective dose.  She has a previous history of a diagnosis of  ADD/ADHD.   ASIF has been reviewed by me and is updated every 3 months. The patient is aware of the potential for addiction and dependence.  The Rx continues to help them concentrate, finish tasks in timely manor, and succeed.  She denies current suicidal and homicidal ideation.  Reviewed causes for potential of discontinuation of stimulant medication,  including but not limited to consideration for diversion, obtaining other controlled substances from other license practitioners, or  inappropriate office behavior.  Patient understands to use a controlled substance as prescribed by physician and to avoid improper use of controlled substances.  Patient will also verbalized understanding that prescriptions to be filled at the same pharmacy  unless office staff is made aware of this.  Patient understands they can be submitted to random urine drug screens and/or random pill counts on any request.  Patient understands they are not to receive early refills.  The patient must produce an official police report for any effort to replace controlled substances that are lost or stolen.  No use of illegal street drugs while receiving controlled substances from this prescribing provider.  The patient is to take medication as  prescribed  and not deviate from the normal schedule without consultation from the provider.  Patient is not to share the medication with others or to take medication with alcohol or other sedatives.  Use caution when driving or operating machinery.  Must always keep the prescription in the original container.  Patient is to store controlled substances in a locked cabinet or other secure storage unit that is cool, dry and out of sunlight.  Patient must immediately notify office if this controlled substances is  improperly taken by another individual.  Reviewed risk for physical dependence, tolerance and addiction.         [facility] neuropsych eval 7-1-15  Weight down now 70 lbs    There is no evidence of aberrant behavior or any red flags.  .   Failed H2 blocker  I do have her taking B12  Updated her RX for HTN Jan visit;  Lipids were better  Thyroid was checked and in range  On asthma Rx and works well    bp 115/70s; I did lower Norvasc last visit  Walking for exercise  Had recent migraine--took Aleve    The following portions of the patient's history were reviewed and updated as appropriate: allergies, current medications, past family history, past medical history, past social history, past surgical history and problem list.    Review of Systems   Constitutional: Negative for activity change, appetite change, fever and unexpected weight change.   HENT: Negative for nosebleeds and trouble swallowing.    Eyes: Negative for pain and visual disturbance.   Respiratory: Negative for chest tightness, shortness of breath and wheezing.    Cardiovascular: Negative for chest pain and palpitations.   Gastrointestinal: Negative for abdominal pain and blood in stool.   Endocrine: Negative.    Genitourinary: Negative for difficulty urinating and hematuria.   Musculoskeletal: Negative for joint swelling.   Skin: Negative for color change and rash.   Allergic/Immunologic: Negative.    Neurological: Positive for headaches.  Negative for syncope and speech difficulty.   Hematological: Negative for adenopathy.   Psychiatric/Behavioral: Positive for decreased concentration. Negative for agitation, confusion and sleep disturbance.   All other systems reviewed and are negative.      Objective   Physical Exam   Constitutional: She is oriented to person, place, and time. She appears well-developed and well-nourished. No distress.   HENT:   Head: Normocephalic and atraumatic.   Right Ear: External ear normal.   Left Ear: External ear normal.   Eyes: Conjunctivae are normal. Right eye exhibits no discharge. Left eye exhibits no discharge. No scleral icterus.   Neck: Normal range of motion.   Pulmonary/Chest: Effort normal. No stridor. No respiratory distress.   Abdominal: Soft. There is no guarding.   Musculoskeletal: Normal range of motion.   Neurological: She is alert and oriented to person, place, and time. Coordination normal.   Skin: Skin is dry. She is not diaphoretic.   Psychiatric: She has a normal mood and affect. Her behavior is normal. Judgment and thought content normal.       Assessment/Plan   Lissette was seen today for add and med refill.    Diagnoses and all orders for this visit:    Attention deficit disorder (ADD) without hyperactivity    Essential hypertension    Other orders  -     amLODIPine (NORVASC) 5 MG tablet; Take 1 tablet by mouth Daily. One PO daily for BP;      bp controlled on Rx  Refill adderall for ADD working well  Labs current  Great job on weight loss

## 2020-05-26 RX ORDER — DEXTROAMPHETAMINE SACCHARATE, AMPHETAMINE ASPARTATE, DEXTROAMPHETAMINE SULFATE AND AMPHETAMINE SULFATE 7.5; 7.5; 7.5; 7.5 MG/1; MG/1; MG/1; MG/1
30 TABLET ORAL 2 TIMES DAILY
Qty: 60 TABLET | Refills: 0 | Status: SHIPPED | OUTPATIENT
Start: 2020-05-26 | End: 2020-06-24 | Stop reason: SDUPTHER

## 2020-05-26 NOTE — TELEPHONE ENCOUNTER
----- Message from Lissette Hawthorne sent at 5/25/2020  3:07 PM EDT -----  Regarding: Prescription Question  Contact: [phone]  Trang,    Will you send my Adderall prescription over to [facility]. I have 2 days of medicine left. Thank you.     Arina Ibanez

## 2020-06-24 RX ORDER — DEXTROAMPHETAMINE SACCHARATE, AMPHETAMINE ASPARTATE, DEXTROAMPHETAMINE SULFATE AND AMPHETAMINE SULFATE 7.5; 7.5; 7.5; 7.5 MG/1; MG/1; MG/1; MG/1
30 TABLET ORAL 2 TIMES DAILY
Qty: 60 TABLET | Refills: 0 | Status: SHIPPED | OUTPATIENT
Start: 2020-06-24 | End: 2020-07-31 | Stop reason: SDUPTHER

## 2020-07-16 ENCOUNTER — TELEPHONE (OUTPATIENT)
Dept: FAMILY MEDICINE CLINIC | Facility: CLINIC | Age: 45
End: 2020-07-16

## 2020-07-16 RX ORDER — ESOMEPRAZOLE MAGNESIUM 40 MG/1
40 CAPSULE, DELAYED RELEASE ORAL
Qty: 90 CAPSULE | Refills: 3 | Status: SHIPPED | OUTPATIENT
Start: 2020-07-16 | End: 2020-08-06

## 2020-07-16 RX ORDER — MONTELUKAST SODIUM 10 MG/1
TABLET ORAL
Qty: 90 TABLET | Refills: 3 | Status: SHIPPED | OUTPATIENT
Start: 2020-07-16 | End: 2021-07-13

## 2020-07-16 NOTE — TELEPHONE ENCOUNTER
----- Message from Lissette Hawthorne sent at 7/16/2020 11:46 AM EDT -----  Regarding: Prescription Question  Contact: [phone]  Keny Costello,    I would like to have my Esomeprazole 90 day supply filled through [facility]. What information do you need to send that in for me?  The prescription plan is under my  - Adilson Ibanez.     Thank you.

## 2020-07-31 ENCOUNTER — TELEMEDICINE (OUTPATIENT)
Dept: FAMILY MEDICINE CLINIC | Facility: CLINIC | Age: 45
End: 2020-07-31

## 2020-07-31 DIAGNOSIS — K21.9 GASTROESOPHAGEAL REFLUX DISEASE WITHOUT ESOPHAGITIS: ICD-10-CM

## 2020-07-31 DIAGNOSIS — I10 ESSENTIAL HYPERTENSION: Primary | ICD-10-CM

## 2020-07-31 DIAGNOSIS — F98.8 ATTENTION DEFICIT DISORDER (ADD) WITHOUT HYPERACTIVITY: ICD-10-CM

## 2020-07-31 DIAGNOSIS — E55.9 VITAMIN D DEFICIENCY: ICD-10-CM

## 2020-07-31 DIAGNOSIS — J45.40 MODERATE PERSISTENT ASTHMA WITHOUT COMPLICATION: ICD-10-CM

## 2020-07-31 DIAGNOSIS — H53.9 IMPAIRED VISUAL PERCEPTION: ICD-10-CM

## 2020-07-31 PROCEDURE — 99442 PR PHYS/QHP TELEPHONE EVALUATION 11-20 MIN: CPT | Performed by: PHYSICIAN ASSISTANT

## 2020-07-31 RX ORDER — ALBUTEROL SULFATE 2.5 MG/3ML
2.5 SOLUTION RESPIRATORY (INHALATION) EVERY 4 HOURS PRN
Qty: 120 VIAL | Refills: 12 | Status: SHIPPED | OUTPATIENT
Start: 2020-07-31 | End: 2021-08-17 | Stop reason: SDUPTHER

## 2020-07-31 RX ORDER — ALBUTEROL SULFATE 90 UG/1
2 AEROSOL, METERED RESPIRATORY (INHALATION) EVERY 4 HOURS PRN
Qty: 1 INHALER | Refills: 11 | Status: SHIPPED | OUTPATIENT
Start: 2020-07-31 | End: 2021-05-04 | Stop reason: SDUPTHER

## 2020-07-31 RX ORDER — DEXTROAMPHETAMINE SACCHARATE, AMPHETAMINE ASPARTATE, DEXTROAMPHETAMINE SULFATE AND AMPHETAMINE SULFATE 7.5; 7.5; 7.5; 7.5 MG/1; MG/1; MG/1; MG/1
30 TABLET ORAL 2 TIMES DAILY
Qty: 60 TABLET | Refills: 0 | Status: SHIPPED | OUTPATIENT
Start: 2020-07-31 | End: 2020-08-31 | Stop reason: SDUPTHER

## 2020-07-31 RX ORDER — AMLODIPINE BESYLATE 5 MG/1
5 TABLET ORAL DAILY
Qty: 90 TABLET | Refills: 1 | Status: SHIPPED | OUTPATIENT
Start: 2020-07-31 | End: 2021-02-01 | Stop reason: SDUPTHER

## 2020-07-31 NOTE — PROGRESS NOTES
Subjective   Lissette Hawthorne is a 45 y.o. female.   You have chosen to receive care through a telehealth visit.  Do you consent to use a video/audio connection for your medical care today? Yes    History of Present Illness   Lissette Hawthorne 45 y.o. female who presents for ADD/ADHD follow up. They are well controlled on their current Rx.  No new problems with insomnia, tachycardia,or weight loss---she has worked hard to lose 70lbs with diet and exercise. The patient has read and signed the [facility] Controlled Substance Contract.  I will continue to see patient for regular follow up appointments and give the lowest effective dose.  They are well controlled on their medication at the lowest effective dose.  She has a previous history of a diagnosis of  ADD/ADHD.   ASIF has been reviewed by me and is updated every 3 months. The patient is aware of the potential for addiction and dependence.  The Rx continues to help them concentrate, finish tasks in timely manor, and succeed.  She denies current suicidal and homicidal ideation.  Reviewed causes for potential of discontinuation of stimulant medication,  including but not limited to consideration for diversion, obtaining other controlled substances from other license practitioners, or  inappropriate office behavior.  Patient understands to use a controlled substance as prescribed by physician and to avoid improper use of controlled substances.  Patient will also verbalized understanding that prescriptions to be filled at the same pharmacy  unless office staff is made aware of this.  Patient understands they can be submitted to random urine drug screens and/or random pill counts on any request.  Patient understands they are not to receive early refills.  The patient must produce an official police report for any effort to replace controlled substances that are lost or stolen.  No use of illegal street drugs while receiving controlled substances from this  prescribing provider.  The patient is to take medication as prescribed  and not deviate from the normal schedule without consultation from the provider.  Patient is not to share the medication with others or to take medication with alcohol or other sedatives.  Use caution when driving or operating machinery.  Must always keep the prescription in the original container.  Patient is to store controlled substances in a locked cabinet or other secure storage unit that is cool, dry and out of sunlight.  Patient must immediately notify office if this controlled substances is  improperly taken by another individual.  Reviewed risk for physical dependence, tolerance and addiction.          [facility] yung 7-1-15  Thyroid was checked and in range  On asthma Rx and works well     Since the last visit, she has overall felt fairly well.  She has Essential Hypertension and well controlled on current medication, GERD controlled on PPI Rx, Hyperlipidemia and working on this with diet and exercise, Seasonal allergies and doing well on their medication , Asthma well controlled and not requiring rescue Albuterol > 2 times a week and Vitamin D deficiency and will update labs for continued management.  she has been compliant with current medications have reviewed them.  The patient denies medication side effects.  Will refill medications. LMP  (LMP Unknown)     Results for orders placed or performed in visit on 10/16/19   Comprehensive Metabolic Panel   Result Value Ref Range    Glucose 84 65 - 99 mg/dL    BUN 18 6 - 20 mg/dL    Creatinine 0.75 0.57 - 1.00 mg/dL    eGFR Non African Am 84 >60 mL/min/1.73    eGFR African Am 101 >60 mL/min/1.73    BUN/Creatinine Ratio 24.0 7.0 - 25.0    Sodium 138 136 - 145 mmol/L    Potassium 4.2 3.5 - 5.2 mmol/L    Chloride 100 98 - 107 mmol/L    Total CO2 24.6 22.0 - 29.0 mmol/L    Calcium 9.7 8.6 - 10.5 mg/dL    Total Protein 7.9 6.0 - 8.5 g/dL    Albumin 5.30 (H) 3.50 - 5.20 g/dL     Globulin 2.6 gm/dL    A/G Ratio 2.0 g/dL    Total Bilirubin 0.6 0.2 - 1.2 mg/dL    Alkaline Phosphatase 64 39 - 117 U/L    AST (SGOT) 13 1 - 32 U/L    ALT (SGPT) 21 1 - 33 U/L   Lipid Panel   Result Value Ref Range    Total Cholesterol 202 (H) 0 - 200 mg/dL    Triglycerides 49 0 - 150 mg/dL    HDL Cholesterol 87 (H) 40 - 60 mg/dL    VLDL Cholesterol 9.8 mg/dL    LDL Cholesterol  105 (H) 0 - 100 mg/dL   T4, Free   Result Value Ref Range    Free T4 1.19 0.93 - 1.70 ng/dL   Folate   Result Value Ref Range    Folate 10.70 4.78 - 24.20 ng/mL   TSH   Result Value Ref Range    TSH 2.650 0.270 - 4.200 uIU/mL   Vitamin D 25 Hydroxy   Result Value Ref Range    25 Hydroxy, Vitamin D 35.3 30.0 - 100.0 ng/ml   Vitamin B12   Result Value Ref Range    Vitamin B-12 394 211 - 946 pg/mL   T3, Free   Result Value Ref Range    T3, Free 2.4 2.0 - 4.4 pg/mL   CBC & Differential   Result Value Ref Range    WBC 6.48 3.40 - 10.80 10*3/mm3    RBC 4.41 3.77 - 5.28 10*6/mm3    Hemoglobin 14.2 12.0 - 15.9 g/dL    Hematocrit 40.7 34.0 - 46.6 %    MCV 92.3 79.0 - 97.0 fL    MCH 32.2 26.6 - 33.0 pg    MCHC 34.9 31.5 - 35.7 g/dL    RDW 12.1 (L) 12.3 - 15.4 %    Platelets 378 140 - 450 10*3/mm3    Neutrophil Rel % 71.6 42.7 - 76.0 %    Lymphocyte Rel % 19.9 19.6 - 45.3 %    Monocyte Rel % 6.2 5.0 - 12.0 %    Eosinophil Rel % 1.2 0.3 - 6.2 %    Basophil Rel % 0.8 0.0 - 1.5 %    Neutrophils Absolute 4.64 1.70 - 7.00 10*3/mm3    Lymphocytes Absolute 1.29 0.70 - 3.10 10*3/mm3    Monocytes Absolute 0.40 0.10 - 0.90 10*3/mm3    Eosinophils Absolute 0.08 0.00 - 0.40 10*3/mm3    Basophils Absolute 0.05 0.00 - 0.20 10*3/mm3    Immature Granulocyte Rel % 0.3 0.0 - 0.5 %    Immature Grans Absolute 0.02 0.00 - 0.05 10*3/mm3    nRBC 0.0 0.0 - 0.2 /100 WBC         The following portions of the patient's history were reviewed and updated as appropriate: allergies, current medications, past family history, past medical history, past social history, past surgical  history and problem list.    Review of Systems   Constitutional: Negative for activity change, appetite change and unexpected weight change.   HENT: Negative for nosebleeds and trouble swallowing.    Eyes: Negative for pain and visual disturbance.   Respiratory: Negative for chest tightness, shortness of breath and wheezing.    Cardiovascular: Negative for chest pain and palpitations.   Gastrointestinal: Negative for abdominal pain and blood in stool.   Endocrine: Negative.    Genitourinary: Negative for difficulty urinating and hematuria.   Musculoskeletal: Negative for joint swelling.   Skin: Negative for color change and rash.   Allergic/Immunologic: Negative.    Neurological: Negative for syncope and speech difficulty.   Hematological: Negative for adenopathy.   Psychiatric/Behavioral: Positive for decreased concentration. Negative for agitation and confusion.   All other systems reviewed and are negative.      Objective   Physical Exam   Constitutional: She is oriented to person, place, and time.   Pulmonary/Chest: Effort normal. No respiratory distress.   Neurological: She is alert and oriented to person, place, and time.   Psychiatric: She has a normal mood and affect. Her behavior is normal. Judgment and thought content normal.       Assessment/Plan   There are no diagnoses linked to this encounter.  Plan, Lissette Hawthorne, was seen today.  she was seen for HTN and continue medication, GERD and will continue on PPI medication, Hyperlipidemia and will work on this with diet and exercise, Seasonal allergies and is doing well on their medication PRN, Asthma and is well controlled on medication.   and Vitamin D deficiency and will update labs .  Labs Oct  Time spent 15 minutes

## 2020-07-31 NOTE — PATIENT INSTRUCTIONS
Amphetamine; Dextroamphetamine tablets  What is this medicine?  AMPHETAMINE; DEXTROAMPHETAMINE(am FET a meen; dex troe am FET a meen) is used to treat attention-deficit hyperactivity disorder (ADHD). It may also be used for narcolepsy. Federal law prohibits giving this medicine to any person other than the person for whom it was prescribed. Do not share this medicine with anyone else.  This medicine may be used for other purposes; ask your health care provider or pharmacist if you have questions.  COMMON BRAND NAME(S): Chung  What should I tell my health care provider before I take this medicine?  They need to know if you have any of these conditions:  · anxiety or panic attacks  · circulation problems in fingers and toes  · glaucoma  · hardening or blockages of the arteries or heart blood vessels  · heart disease or a heart defect  · high blood pressure  · history of a drug or alcohol abuse problem  · history of stroke  · kidney disease  · liver disease  · mental illness  · seizures  · suicidal thoughts, plans, or attempt; a previous suicide attempt by you or a family member  · thyroid disease  · Tourette's syndrome  · an unusual or allergic reaction to dextroamphetamine, other amphetamines, other medicines, foods, dyes, or preservatives  · pregnant or trying to get pregnant  · breast-feeding  How should I use this medicine?  Take this medicine by mouth with a glass of water. Follow the directions on the prescription label. Take your doses at regular intervals. Do not take your medicine more often than directed. Do not suddenly stop your medicine. You must gradually reduce the dose or you may feel withdrawal effects. Ask your doctor or health care professional for advice.  Talk to your pediatrician regarding the use of this medicine in children. Special care may be needed. While this drug may be prescribed for children as young as 3 years for selected conditions, precautions do apply.  Overdosage: If you think  you have taken too much of this medicine contact a poison control center or emergency room at once.  NOTE: This medicine is only for you. Do not share this medicine with others.  What if I miss a dose?  If you miss a dose, take it as soon as you can. If it is almost time for your next dose, take only that dose. Do not take double or extra doses.  What may interact with this medicine?  Do not take this medicine with any of the following medications:  · MAOIs like Carbex, Eldepryl, Marplan, Nardil, and Parnate  · other stimulant medicines for attention disorders  This medicine may also interact with the following medications:  · acetazolamide  · ammonium chloride  · antacids  · ascorbic acid  · atomoxetine  · caffeine  · certain medicines for blood pressure  · certain medicines for depression, anxiety, or psychotic disturbances  · certain medicines for seizures like carbamazepine, phenobarbital, phenytoin  · certain medicines for stomach problems like cimetidine, ranitidine, famotidine, esomeprazole, omeprazole, lansoprazole, pantoprazole  · lithium  · medicines for colds and breathing difficulties  · medicines for diabetes  · medicines or dietary supplements for weight loss or to stay awake  · methenamine  · narcotic medicines for pain  · quinidine  · ritonavir  · sodium bicarbonate  · [place]'s wort  This list may not describe all possible interactions. Give your health care provider a list of all the medicines, herbs, non-prescription drugs, or dietary supplements you use. Also tell them if you smoke, drink alcohol, or use illegal drugs. Some items may interact with your medicine.  What should I watch for while using this medicine?  Visit your doctor or health care professional for regular checks on your progress. This prescription requires that you follow special procedures with your doctor and pharmacy. You will need to have a new written prescription from your doctor every time you need a refill.  This medicine  may affect your concentration, or hide signs of tiredness. Until you know how this medicine affects you, do not drive, ride a bicycle, use machinery, or do anything that needs mental alertness.  Tell your doctor or health care professional if this medicine loses its effects, or if you feel you need to take more than the prescribed amount. Do not change the dosage without talking to your doctor or health care professional.  Decreased appetite is a common side effect when starting this medicine. Eating small, frequent meals or snacks can help. Talk to your doctor if you continue to have poor eating habits. Height and weight growth of a child taking this medicine will be monitored closely.  Do not take this medicine close to bedtime. It may prevent you from sleeping.  If you are going to need surgery, a MRI, CT scan, or other procedure, tell your doctor that you are taking this medicine. You may need to stop taking this medicine before the procedure.  Tell your doctor or healthcare professional right away if you notice unexplained wounds on your fingers and toes while taking this medicine. You should also tell your healthcare provider if you experience numbness or pain, changes in the skin color, or sensitivity to temperature in your fingers or toes.  What side effects may I notice from receiving this medicine?  Side effects that you should report to your doctor or health care professional as soon as possible:  · allergic reactions like skin rash, itching or hives, swelling of the face, lips, or tongue  · anxious  · breathing problems  · changes in emotions or moods  · changes in vision  · chest pain or chest tightness  · fast, irregular heartbeat  · fingers or toes feel numb, cool, painful  · hallucination, loss of contact with reality  · high blood pressure  · males: prolonged or painful erection  · seizures  · signs and symptoms of serotonin syndrome like confusion, increased sweating, fever, tremor, stiff muscles,  diarrhea  · signs and symptoms of a stroke like changes in vision; confusion; trouble speaking or understanding; severe headaches; sudden numbness or weakness of the face, arm or leg; trouble walking; dizziness; loss of balance or coordination  · suicidal thoughts or other mood changes  · uncontrollable head, mouth, neck, arm, or leg movements  Side effects that usually do not require medical attention (report to your doctor or health care professional if they continue or are bothersome):  · dry mouth  · headache  · irritability  · loss of appetite  · nausea  · trouble sleeping  · weight loss  This list may not describe all possible side effects. Call your doctor for medical advice about side effects. You may report side effects to FDA at 1-105-CRE-4996.  Where should I keep my medicine?  Keep out of the reach of children. This medicine can be abused. Keep your medicine in a safe place to protect it from theft. Do not share this medicine with anyone. Selling or giving away this medicine is dangerous and against the law.  Store at room temperature between 15 and 30 degrees C (59 and 86 degrees F). Keep container tightly closed. Throw away any unused medicine after the expiration date. Dispose of properly. This medicine may cause accidental overdose and death if it is taken by other adults, children, or pets. Mix any unused medicine with a substance like cat litter or coffee grounds. Then throw the medicine away in a sealed container like a sealed bag or a coffee can with a lid. Do not use the medicine after the expiration date.  NOTE: This sheet is a summary. It may not cover all possible information. If you have questions about this medicine, talk to your doctor, pharmacist, or health care provider.  © 2020 [employer] (2018-02-09 16:28:23)

## 2020-08-06 RX ORDER — ESOMEPRAZOLE MAGNESIUM 40 MG/1
40 CAPSULE, DELAYED RELEASE ORAL
Qty: 90 CAPSULE | Refills: 3 | Status: SHIPPED | OUTPATIENT
Start: 2020-08-06 | End: 2021-05-26

## 2020-08-31 RX ORDER — DEXTROAMPHETAMINE SACCHARATE, AMPHETAMINE ASPARTATE, DEXTROAMPHETAMINE SULFATE AND AMPHETAMINE SULFATE 7.5; 7.5; 7.5; 7.5 MG/1; MG/1; MG/1; MG/1
30 TABLET ORAL 2 TIMES DAILY
Qty: 60 TABLET | Refills: 0 | Status: SHIPPED | OUTPATIENT
Start: 2020-08-31 | End: 2020-10-02 | Stop reason: SDUPTHER

## 2020-08-31 NOTE — TELEPHONE ENCOUNTER
----- Message from Lissette Hawthorne sent at 8/31/2020  6:29 AM EDT -----  Regarding: Prescription Question  Contact: [phone]  Good Morning,    Will you please send my Adderall prescription to [facility] I have 2 days left of my current prescription.     Thank you   Arina Ibanez

## 2020-09-03 ENCOUNTER — OFFICE VISIT (OUTPATIENT)
Dept: FAMILY MEDICINE CLINIC | Facility: CLINIC | Age: 45
End: 2020-09-03

## 2020-09-03 VITALS
WEIGHT: 150 LBS | HEART RATE: 86 BPM | OXYGEN SATURATION: 100 % | BODY MASS INDEX: 27.6 KG/M2 | HEIGHT: 62 IN | SYSTOLIC BLOOD PRESSURE: 132 MMHG | TEMPERATURE: 97.3 F | DIASTOLIC BLOOD PRESSURE: 78 MMHG | RESPIRATION RATE: 16 BRPM

## 2020-09-03 DIAGNOSIS — M79.645 PAIN IN FINGER OF LEFT HAND: Primary | ICD-10-CM

## 2020-09-03 PROCEDURE — 73140 X-RAY EXAM OF FINGER(S): CPT | Performed by: PHYSICIAN ASSISTANT

## 2020-09-03 PROCEDURE — 99213 OFFICE O/P EST LOW 20 MIN: CPT | Performed by: PHYSICIAN ASSISTANT

## 2020-09-03 NOTE — PROGRESS NOTES
"Subjective   Lissette Hawthorne is a 45 y.o. female.     History of Present Illness   Lissette Hawthorne 45 y.o. female /78 (BP Location: Right arm, Patient Position: Sitting, Cuff Size: Adult)   Pulse 86   Temp 97.3 °F (36.3 °C) (Skin)   Resp 16   Ht 157.5 cm (62\")   Wt 68 kg (150 lb)   LMP  (LMP Unknown)   SpO2 100%   BMI 27.44 kg/m²  who presents today for possible fx 2nd left finger at PIP and MCP joint; walking dog last PM and pulled her on the leash handle---not plastic. Not sure if hyperflexed finger; heard pop and instant edema and pain; more bruising and edema today; red area MCP left 2nd ; more spread down finger. Pain is MCP and PIP left second; constant pain; ice and Advil-some help  she has a history of   Patient Active Problem List   Diagnosis   • ADD (attention deficit disorder)   • Essential hypertension   • Migraine without aura and without status migrainosus, not intractable   • Impaired visual perception   • Gastroesophageal reflux disease without esophagitis   • Abnormal electroencephalogram (EEG)   • Lumbar radiculopathy   • Degeneration of intervertebral disc of lumbar region   • Spinal stenosis of lumbar region   • Herniated lumbar intervertebral disc   • Postoperative state   • Moderate asthma without complication   • Seasonal allergic rhinitis   • Ankylosis of lumbar spine   • Status post lumbar spine operation   • Abnormal finding on MRI of brain   • Hyperosmia   • Dysosmia   .    No prior fx  Pain worse to touch and ROM, some N/T  X-Ray  Interpretation report in house X-rays that I personally viewed    Relevant Clinical Issues/Diagnoses/Indications:  Finger trauma 2nd left      Clinical Findings:  Reviewed Xray with Dr Rubalcava; no fx; still splint; get official read; no mass, no fx          Comparative Data:  none          Date of Previous X-ray:    Change on current X-ray:      The following portions of the patient's history were reviewed and updated as appropriate: " allergies, current medications, past family history, past medical history, past social history, past surgical history and problem list.    Review of Systems   Constitutional: Negative for activity change, appetite change and unexpected weight change.   HENT: Negative for nosebleeds and trouble swallowing.    Eyes: Negative for pain and visual disturbance.   Respiratory: Negative for chest tightness, shortness of breath and wheezing.    Cardiovascular: Negative for chest pain and palpitations.   Gastrointestinal: Negative for abdominal pain and blood in stool.   Endocrine: Negative.    Genitourinary: Negative for difficulty urinating and hematuria.   Musculoskeletal: Positive for joint swelling.   Skin: Negative for color change and rash.   Allergic/Immunologic: Negative.    Neurological: Negative for syncope and speech difficulty.   Hematological: Negative for adenopathy.   Psychiatric/Behavioral: Negative for agitation and confusion.   All other systems reviewed and are negative.      Objective   Physical Exam   Constitutional: She is oriented to person, place, and time. She appears well-developed and well-nourished. No distress.   Musculoskeletal: She exhibits tenderness. She exhibits no edema or deformity.   Pink over dorsal MCP left second with edema and bruising; decreased ROM d/t pain; same with PIP; sore and more purple color; limited ROM d/t pain; sensation intact    She is able to flex and extend finger at her PIP, DIP, MCP joint  Sensation intact  Nail bed normal   Neurological: She is alert and oriented to person, place, and time. No sensory deficit. She exhibits normal muscle tone.   Skin: Skin is warm. Capillary refill takes less than 2 seconds. No rash noted. She is not diaphoretic. There is erythema.   Psychiatric: She has a normal mood and affect. Her behavior is normal. Judgment and thought content normal.   Nursing note and vitals reviewed.      Assessment/Plan   Diagnoses and all orders for this  visit:    Pain in finger of left hand  -     XR Hand 3+ View Left (In Office)  -     XR Finger 2+ View Left (In Office)      D/w Dr Rubalcava and splint left 2nd finger to immobilize left 2nd PIP and MCP; remove at night and perform ROM  Refer hand if worse, no help, or change   APAP or Advil PRN Pain  Ice  rest  elevate     Answers for HPI/ROS submitted by the patient on 9/3/2020   What is the primary reason for your visit?: Other  Please describe your symptoms.: Possible broken finger - swelling and bruising spreading up finger with some redness and warm to touch  Have you had these symptoms before?: No  How long have you been having these symptoms?: 1-4 days

## 2020-09-03 NOTE — PATIENT INSTRUCTIONS
Finger Sprain, Adult  A finger sprain is a tear or stretch in a ligament in a finger. Ligaments are tissues that connect bones to each other.  What are the causes?  Finger sprains happen when something makes the bones in the hand move in an abnormal way. They are often caused by a fall or accident.  What increases the risk?  This condition is more likely to develop in people who:  · Participate in sports in which it is easy to fall, such as skiing.  · Play sports that involve catching an object, such as basketball.  · Have poor strength and flexibility.  What are the signs or symptoms?  Symptoms of this condition include:  · Pain or tenderness in the finger.  · Swelling in the finger.  · Bluish appearance to the finger.  · Bruising.  · Difficulty bending and flexing the finger.  How is this diagnosed?  This condition is diagnosed with an exam of your finger. Your health care provider may do an X-ray to see if any bones are broken or dislocated.  How is this treated?  Treatment for this condition depends on how severe the sprain is. It may involve:  · Preventing the finger from moving for a period of time. Your finger may be wrapped in a bandage (dressing), splint, or cast, or your finger may be taped to the fingers beside it (rachel taping).  · Keeping the hand raised (elevated) above the level of the heart during rest and sleep.  · Medicines for pain.  · Exercises to strengthen the finger. These may be recommended when the finger has healed.  · Surgery to reconnect the ligament to a bone. This may be done if the ligament was torn all the way.  Follow these instructions at home:  If you have a splint:  · Do not put pressure on any part of the splint until it is fully hardened. This may take several hours.  · Wear the splint as told by your health care provider. Remove it only as told by your health care provider.  · Loosen the splint if your fingers tingle, become numb, or turn cold and blue.  · Keep the splint  clean.  · If the splint is not waterproof:  ? Do not let it get wet.  ? Cover it with a watertight covering when you take a bath or a shower.  If you have a cast:  · Do not put pressure on any part of the cast until it is fully hardened. This may take several hours.  · Do not stick anything inside the cast to scratch your skin. Doing that increases your risk of infection.  · Check the skin around the cast every day. Tell your health care provider about any concerns.  · You may put lotion on dry skin around the edges of the cast. Do not put lotion on the skin underneath the cast.  · Keep the cast clean.  · If the cast is not waterproof:  ? Do not let it get wet.  ? Cover it with a watertight covering when you take a bath or shower.  Managing pain, stiffness, and swelling  · If directed, put ice on the injured area:  ? If you have a removable splint, remove it as told by your health care provider.  ? Put ice in a plastic bag.  ? Place a towel between your skin and the bag or between your cast and the bag.  ? Leave the ice on for 20 minutes, 2-3 times a day.  · Gently move your fingers often to avoid stiffness and to lessen swelling.  · Elevate the injured area above the level of your heart while you are sitting or lying down.  Medicines  · Take over-the-counter and prescription medicines only as told by your health care provider.  · Do not drive or use heavy machinery while taking prescription pain medicine.  General instructions  · Keep any dressings dry until your health care provider says they can be removed.  · Do exercises as told by your health care provider or physical therapist.  · Do not wear rings on your injured finger.  · Keep all follow-up visits as told by your health care provider. This is important.  Get help right away if:  · Your pain is not controlled with medicine.  · Your bruising or swelling gets worse.  · Your splint or cast is damaged.  · Your finger is numb or blue.  · Your finger feels colder  to the touch than normal.  · You develop a fever.  Summary  · A finger sprain is a tear or stretch in a ligament in a finger. Ligaments are tissues that connect bones to each other.  · Finger sprains happen when something makes the bones in the hand move in an abnormal way. They are often caused by a fall or accident.  · This condition is diagnosed with an exam of your finger. Your health care provider may do an X-ray to see if any bones are broken or dislocated.  · Treatment for this condition depends on how severe the sprain is. Treatment may involve wearing a splint or cast. Surgery to reconnect the ligament to a bone may be needed if the ligament was torn all the way.  This information is not intended to replace advice given to you by your health care provider. Make sure you discuss any questions you have with your health care provider.  Document Released: 01/25/2006 Document Revised: 11/30/2018 Document Reviewed: 03/09/2018  Elsevier Patient Education © 2020 [employer]

## 2020-10-01 ENCOUNTER — RESULTS ENCOUNTER (OUTPATIENT)
Dept: FAMILY MEDICINE CLINIC | Facility: CLINIC | Age: 45
End: 2020-10-01

## 2020-10-01 DIAGNOSIS — F98.8 ATTENTION DEFICIT DISORDER (ADD) WITHOUT HYPERACTIVITY: ICD-10-CM

## 2020-10-01 DIAGNOSIS — H53.9 IMPAIRED VISUAL PERCEPTION: ICD-10-CM

## 2020-10-01 DIAGNOSIS — I10 ESSENTIAL HYPERTENSION: ICD-10-CM

## 2020-10-01 DIAGNOSIS — E55.9 VITAMIN D DEFICIENCY: ICD-10-CM

## 2020-10-01 DIAGNOSIS — J45.40 MODERATE PERSISTENT ASTHMA WITHOUT COMPLICATION: ICD-10-CM

## 2020-10-01 DIAGNOSIS — K21.9 GASTROESOPHAGEAL REFLUX DISEASE WITHOUT ESOPHAGITIS: ICD-10-CM

## 2020-10-02 ENCOUNTER — OFFICE VISIT (OUTPATIENT)
Dept: FAMILY MEDICINE CLINIC | Facility: CLINIC | Age: 45
End: 2020-10-02

## 2020-10-02 ENCOUNTER — HOSPITAL ENCOUNTER (OUTPATIENT)
Dept: GENERAL RADIOLOGY | Facility: HOSPITAL | Age: 45
Discharge: HOME | End: 2020-10-02
Admitting: PHYSICIAN ASSISTANT

## 2020-10-02 VITALS
RESPIRATION RATE: 16 BRPM | BODY MASS INDEX: 6.26 KG/M2 | DIASTOLIC BLOOD PRESSURE: 72 MMHG | OXYGEN SATURATION: 100 % | WEIGHT: 34 LBS | SYSTOLIC BLOOD PRESSURE: 132 MMHG | HEIGHT: 62 IN | HEART RATE: 91 BPM | TEMPERATURE: 97 F

## 2020-10-02 DIAGNOSIS — M79.644 PAIN OF FINGER OF RIGHT HAND: Primary | ICD-10-CM

## 2020-10-02 PROCEDURE — 73140 X-RAY EXAM OF FINGER(S): CPT

## 2020-10-02 PROCEDURE — 99213 OFFICE O/P EST LOW 20 MIN: CPT | Performed by: PHYSICIAN ASSISTANT

## 2020-10-02 RX ORDER — DEXTROAMPHETAMINE SACCHARATE, AMPHETAMINE ASPARTATE, DEXTROAMPHETAMINE SULFATE AND AMPHETAMINE SULFATE 7.5; 7.5; 7.5; 7.5 MG/1; MG/1; MG/1; MG/1
30 TABLET ORAL 2 TIMES DAILY
Qty: 60 TABLET | Refills: 0 | Status: SHIPPED | OUTPATIENT
Start: 2020-10-02 | End: 2020-11-03 | Stop reason: SDUPTHER

## 2020-10-02 NOTE — PROGRESS NOTES
"Subjective   Lissette Hawthorne is a 45 y.o. female.     History of Present Illness   Lissette Hawthorne 45 y.o. female /72 (BP Location: Right arm, Patient Position: Sitting, Cuff Size: Adult)   Pulse 91   Temp 97 °F (36.1 °C) (Temporal)   Resp 16   Ht 157.5 cm (62\")   Wt 15.4 kg (34 lb)   LMP  (LMP Unknown)   SpO2 100%   BMI 6.22 kg/m²  who presents today for continued left second finger pain. Sore in her MCP # 2 and now with inability to fully flex finger.  Has pain with flexion and reduced ROM. Had seen me 9-3-20 ---day after accident of feeling and hearing pop in second finger with walking the dog and pulled leash.   she has a history of   Patient Active Problem List   Diagnosis   • ADD (attention deficit disorder)   • Essential hypertension   • Migraine without aura and without status migrainosus, not intractable   • Impaired visual perception   • Gastroesophageal reflux disease without esophagitis   • Abnormal electroencephalogram (EEG)   • Lumbar radiculopathy   • Degeneration of intervertebral disc of lumbar region   • Spinal stenosis of lumbar region   • Herniated lumbar intervertebral disc   • Postoperative state   • Moderate asthma without complication   • Seasonal allergic rhinitis   • Ankylosis of lumbar spine   • Status post lumbar spine operation   • Abnormal finding on MRI of brain   • Hyperosmia   • Dysosmia   .        The following portions of the patient's history were reviewed and updated as appropriate: allergies, current medications, past family history, past medical history, past social history, past surgical history and problem list.    Review of Systems   Constitutional: Negative for activity change, appetite change and unexpected weight change.   HENT: Negative for nosebleeds and trouble swallowing.    Eyes: Negative for pain and visual disturbance.   Respiratory: Negative for chest tightness, shortness of breath and wheezing.    Cardiovascular: Negative for chest pain and " palpitations.   Gastrointestinal: Negative for abdominal pain and blood in stool.   Endocrine: Negative.    Genitourinary: Negative for difficulty urinating and hematuria.   Musculoskeletal: Positive for arthralgias. Negative for joint swelling.   Skin: Negative for color change and rash.   Allergic/Immunologic: Negative.    Neurological: Negative for syncope and speech difficulty.   Hematological: Negative for adenopathy.   Psychiatric/Behavioral: Negative for agitation and confusion.   All other systems reviewed and are negative.      Objective   Physical Exam  Vitals signs and nursing note reviewed.   Constitutional:       General: She is not in acute distress.     Appearance: She is well-developed. She is not ill-appearing or toxic-appearing.   HENT:      Head: Normocephalic.      Right Ear: External ear normal.      Left Ear: External ear normal.      Nose: Nose normal.      Mouth/Throat:      Pharynx: Oropharynx is clear.   Eyes:      General: No scleral icterus.     Conjunctiva/sclera: Conjunctivae normal.      Pupils: Pupils are equal, round, and reactive to light.   Neck:      Musculoskeletal: Normal range of motion and neck supple.      Thyroid: No thyromegaly.   Pulmonary:      Effort: Pulmonary effort is normal. No respiratory distress.      Breath sounds: No stridor.   Musculoskeletal:         General: Swelling and tenderness (right 2nd MCP, DIP, PIP and medial 3rd finger sore to palp) present. No deformity.      Comments: Unable to fully flex left 2nd finger and is sore   Skin:     General: Skin is warm and dry.      Findings: No rash.   Neurological:      General: No focal deficit present.      Mental Status: She is alert and oriented to person, place, and time. Mental status is at baseline.   Psychiatric:         Mood and Affect: Mood normal.         Behavior: Behavior normal.         Thought Content: Thought content normal.         Judgment: Judgment normal.         Assessment/Plan   Lissette was  seen today for hand pain.    Diagnoses and all orders for this visit:    Pain of finger of right hand  -     Ambulatory Referral to Hand Surgery  -     XR Finger 2+ View Left      Will re xray finger--sent to [facility]  Send to hand surgeon ASAP

## 2020-10-07 ENCOUNTER — TELEPHONE (OUTPATIENT)
Dept: FAMILY MEDICINE CLINIC | Facility: CLINIC | Age: 45
End: 2020-10-07

## 2020-10-07 NOTE — TELEPHONE ENCOUNTER
----- Message from Trang Dohrety PA-C sent at 10/2/2020 12:32 PM EDT -----  Still no fracture; see DR Licea next; he can decide if need MRI    So sorry about Dad.

## 2020-11-03 ENCOUNTER — OFFICE VISIT (OUTPATIENT)
Dept: FAMILY MEDICINE CLINIC | Facility: CLINIC | Age: 45
End: 2020-11-03

## 2020-11-03 VITALS
TEMPERATURE: 97.3 F | HEIGHT: 62 IN | WEIGHT: 139 LBS | HEART RATE: 83 BPM | DIASTOLIC BLOOD PRESSURE: 80 MMHG | SYSTOLIC BLOOD PRESSURE: 136 MMHG | BODY MASS INDEX: 25.58 KG/M2

## 2020-11-03 DIAGNOSIS — F98.8 ATTENTION DEFICIT DISORDER (ADD) WITHOUT HYPERACTIVITY: ICD-10-CM

## 2020-11-03 DIAGNOSIS — K21.9 GASTROESOPHAGEAL REFLUX DISEASE WITHOUT ESOPHAGITIS: ICD-10-CM

## 2020-11-03 DIAGNOSIS — I10 ESSENTIAL HYPERTENSION: ICD-10-CM

## 2020-11-03 DIAGNOSIS — J45.40 MODERATE PERSISTENT ASTHMA WITHOUT COMPLICATION: Primary | ICD-10-CM

## 2020-11-03 PROCEDURE — 99213 OFFICE O/P EST LOW 20 MIN: CPT | Performed by: PHYSICIAN ASSISTANT

## 2020-11-03 RX ORDER — DEXTROAMPHETAMINE SACCHARATE, AMPHETAMINE ASPARTATE, DEXTROAMPHETAMINE SULFATE AND AMPHETAMINE SULFATE 7.5; 7.5; 7.5; 7.5 MG/1; MG/1; MG/1; MG/1
30 TABLET ORAL 2 TIMES DAILY
Qty: 60 TABLET | Refills: 0 | Status: SHIPPED | OUTPATIENT
Start: 2020-11-03 | End: 2020-11-30 | Stop reason: SDUPTHER

## 2020-11-03 NOTE — PROGRESS NOTES
Subjective   Lissette Hawthorne is a 45 y.o. female.     History of Present Illness   Lissette Hawthorne 45 y.o. female who presents for ADD/ADHD follow up. They are well controlled on their current Rx.  No new problems with insomnia, tachycardia,or weight loss---she has worked hard to lose 70lbs with diet and exercise. The patient has read and signed the [facility] Controlled Substance Contract.  I will continue to see patient for regular follow up appointments and give the lowest effective dose.  They are well controlled on their medication at the lowest effective dose.  She has a previous history of a diagnosis of  ADD/ADHD.   ASIF has been reviewed by me and is updated every 3 months. The patient is aware of the potential for addiction and dependence.  The Rx continues to help them concentrate, finish tasks in timely manor, and succeed.  She denies current suicidal and homicidal ideation.  Reviewed causes for potential of discontinuation of stimulant medication,  including but not limited to consideration for diversion, obtaining other controlled substances from other license practitioners, or  inappropriate office behavior.  Patient understands to use a controlled substance as prescribed by physician and to avoid improper use of controlled substances.  Patient will also verbalized understanding that prescriptions to be filled at the same pharmacy  unless office staff is made aware of this.  Patient understands they can be submitted to random urine drug screens and/or random pill counts on any request.  Patient understands they are not to receive early refills.  The patient must produce an official police report for any effort to replace controlled substances that are lost or stolen.  No use of illegal street drugs while receiving controlled substances from this prescribing provider.  The patient is to take medication as prescribed  and not deviate from the normal schedule without consultation from the  provider.  Patient is not to share the medication with others or to take medication with alcohol or other sedatives.  Use caution when driving or operating machinery.  Must always keep the prescription in the original container.  Patient is to store controlled substances in a locked cabinet or other secure storage unit that is cool, dry and out of sunlight.  Patient must immediately notify office if this controlled substances is  improperly taken by another individual.  Reviewed risk for physical dependence, tolerance and addiction.          [employer] starla yung 7-1-15    Saw hand doc--injected finger==not much help  On asthma Rx and works well     Needs note for work verifying that she has a chronic medical condition which is mild asthma, also need to state that she cares for a parent with COPD  The following portions of the patient's history were reviewed and updated as appropriate: allergies, current medications, past family history, past medical history, past social history, past surgical history and problem list.    Review of Systems   Constitutional: Negative for activity change, appetite change and unexpected weight change.   HENT: Negative for nosebleeds and trouble swallowing.    Eyes: Negative for pain and visual disturbance.   Respiratory: Negative for chest tightness, shortness of breath and wheezing.    Cardiovascular: Negative for chest pain and palpitations.   Gastrointestinal: Negative for abdominal pain and blood in stool.   Endocrine: Negative.    Genitourinary: Negative for difficulty urinating and hematuria.   Musculoskeletal: Negative for joint swelling.   Skin: Negative for color change and rash.   Allergic/Immunologic: Negative.    Neurological: Negative for syncope and speech difficulty.   Hematological: Negative for adenopathy.   Psychiatric/Behavioral: Positive for decreased concentration. Negative for agitation and confusion.   All other systems reviewed and are  negative.      Objective   Physical Exam  Vitals signs and nursing note reviewed.   Constitutional:       General: She is not in acute distress.     Appearance: She is well-developed. She is not ill-appearing or toxic-appearing.   HENT:      Head: Normocephalic.      Right Ear: External ear normal.      Left Ear: External ear normal.      Nose: Nose normal.      Mouth/Throat:      Pharynx: Oropharynx is clear.   Eyes:      General: No scleral icterus.     Conjunctiva/sclera: Conjunctivae normal.      Pupils: Pupils are equal, round, and reactive to light.   Neck:      Musculoskeletal: Normal range of motion and neck supple.      Thyroid: No thyromegaly.   Cardiovascular:      Rate and Rhythm: Normal rate and regular rhythm.      Heart sounds: Normal heart sounds. No murmur.   Pulmonary:      Effort: Pulmonary effort is normal. No respiratory distress.      Breath sounds: Normal breath sounds.   Musculoskeletal: Normal range of motion.         General: No deformity.   Skin:     General: Skin is warm and dry.      Findings: No rash.   Neurological:      General: No focal deficit present.      Mental Status: She is alert and oriented to person, place, and time. Mental status is at baseline.   Psychiatric:         Mood and Affect: Mood normal.         Behavior: Behavior normal.         Thought Content: Thought content normal.         Judgment: Judgment normal.         Assessment/Plan   Diagnoses and all orders for this visit:    1. Moderate persistent asthma without complication (Primary)    2. Attention deficit disorder (ADD) without hyperactivity    3. Gastroesophageal reflux disease without esophagitis    4. Essential hypertension      Note for work  Cont GERD RX and thyroid Rx  Cont asthma meds---working  Cont Adderall for ADD  Labs  bp borderline         Answers for HPI/ROS submitted by the patient on 10/29/2020   What is the primary reason for your visit?: Other  Please describe your symptoms.: 3 month follow up  for ADD  Have you had these symptoms before?: Yes  How long have you been having these symptoms?: Greater than 2 weeks  Please list any medications you are currently taking for this condition.: Adderall

## 2020-11-30 RX ORDER — DEXTROAMPHETAMINE SACCHARATE, AMPHETAMINE ASPARTATE, DEXTROAMPHETAMINE SULFATE AND AMPHETAMINE SULFATE 7.5; 7.5; 7.5; 7.5 MG/1; MG/1; MG/1; MG/1
30 TABLET ORAL 2 TIMES DAILY
Qty: 60 TABLET | Refills: 0 | Status: SHIPPED | OUTPATIENT
Start: 2020-11-30 | End: 2020-12-30 | Stop reason: SDUPTHER

## 2020-11-30 NOTE — TELEPHONE ENCOUNTER
----- Message from Lissette Hawthorne sent at 11/30/2020  6:48 AM EST -----  Regarding: Prescription Question  Contact: [phone]  Good Morning,    I only have 3 days left of my Adderall if you could send a new prescription to [facility].     Thank you!

## 2020-12-30 RX ORDER — DEXTROAMPHETAMINE SACCHARATE, AMPHETAMINE ASPARTATE, DEXTROAMPHETAMINE SULFATE AND AMPHETAMINE SULFATE 7.5; 7.5; 7.5; 7.5 MG/1; MG/1; MG/1; MG/1
30 TABLET ORAL 2 TIMES DAILY
Qty: 60 TABLET | Refills: 0 | Status: SHIPPED | OUTPATIENT
Start: 2020-12-30 | End: 2021-02-01 | Stop reason: SDUPTHER

## 2020-12-30 NOTE — TELEPHONE ENCOUNTER
----- Message from Lissette Hawthorne sent at 12/30/2020  7:43 AM EST -----  Regarding: Prescription Question  Contact: [phone]  Good Morning,    I only have 3 days left of my Adderall prescription can you please send my prescription to [facility].     Thank you,  Arina Hawthorne

## 2021-02-01 ENCOUNTER — TELEMEDICINE (OUTPATIENT)
Dept: FAMILY MEDICINE CLINIC | Facility: CLINIC | Age: 46
End: 2021-02-01

## 2021-02-01 DIAGNOSIS — E55.9 VITAMIN D DEFICIENCY: Chronic | ICD-10-CM

## 2021-02-01 DIAGNOSIS — E78.2 HYPERLIPIDEMIA, MIXED: Chronic | ICD-10-CM

## 2021-02-01 DIAGNOSIS — J45.40 MODERATE PERSISTENT ASTHMA WITHOUT COMPLICATION: Chronic | ICD-10-CM

## 2021-02-01 DIAGNOSIS — K21.9 GASTROESOPHAGEAL REFLUX DISEASE WITHOUT ESOPHAGITIS: Chronic | ICD-10-CM

## 2021-02-01 DIAGNOSIS — J30.9 CHRONIC ALLERGIC RHINITIS: Chronic | ICD-10-CM

## 2021-02-01 DIAGNOSIS — F98.8 ATTENTION DEFICIT DISORDER (ADD) WITHOUT HYPERACTIVITY: Chronic | ICD-10-CM

## 2021-02-01 DIAGNOSIS — I10 ESSENTIAL HYPERTENSION: Primary | Chronic | ICD-10-CM

## 2021-02-01 PROCEDURE — 99214 OFFICE O/P EST MOD 30 MIN: CPT | Performed by: PHYSICIAN ASSISTANT

## 2021-02-01 RX ORDER — DEXTROAMPHETAMINE SACCHARATE, AMPHETAMINE ASPARTATE, DEXTROAMPHETAMINE SULFATE AND AMPHETAMINE SULFATE 7.5; 7.5; 7.5; 7.5 MG/1; MG/1; MG/1; MG/1
30 TABLET ORAL 2 TIMES DAILY
Qty: 60 TABLET | Refills: 0 | Status: SHIPPED | OUTPATIENT
Start: 2021-02-01 | End: 2021-03-01 | Stop reason: SDUPTHER

## 2021-02-01 RX ORDER — AMLODIPINE BESYLATE 5 MG/1
5 TABLET ORAL DAILY
Qty: 90 TABLET | Refills: 1 | Status: SHIPPED | OUTPATIENT
Start: 2021-02-01 | End: 2021-08-17 | Stop reason: SDUPTHER

## 2021-02-01 RX ORDER — TOLTERODINE 2 MG/1
2 CAPSULE, EXTENDED RELEASE ORAL DAILY
COMMUNITY
Start: 2020-11-16

## 2021-02-01 NOTE — PROGRESS NOTES
Subjective   Lissette Hawthorne is a 45 y.o. female.     History of Present Illness   Lissette Hawthorne 45 y.o. female who presents for ADD/ADHD follow up. They are well controlled on their current Rx.  She is grieving her Dad. .  No new problems with insomnia, tachycardia,or weight loss---she has worked hard to lose 70lbs with diet and exercise. The patient has read and signed the [facility] Controlled Substance Contract.  I will continue to see patient for regular follow up appointments and give the lowest effective dose.  They are well controlled on their medication at the lowest effective dose.  She has a previous history of a diagnosis of  ADD/ADHD.   ASIF has been reviewed by me and is updated every 3 months. The patient is aware of the potential for addiction and dependence.  The Rx continues to help them concentrate, finish tasks in timely manor, and succeed.  She denies current suicidal and homicidal ideation.  Reviewed causes for potential of discontinuation of stimulant medication,  including but not limited to consideration for diversion, obtaining other controlled substances from other license practitioners, or  inappropriate office behavior.  Patient understands to use a controlled substance as prescribed by physician and to avoid improper use of controlled substances.  Patient will also verbalized understanding that prescriptions to be filled at the same pharmacy  unless office staff is made aware of this.  Patient understands they can be submitted to random urine drug screens and/or random pill counts on any request.  Patient understands they are not to receive early refills.  The patient must produce an official police report for any effort to replace controlled substances that are lost or stolen.  No use of illegal street drugs while receiving controlled substances from this prescribing provider.  The patient is to take medication as prescribed  and not deviate from the normal schedule  without consultation from the provider.  Patient is not to share the medication with others or to take medication with alcohol or other sedatives.  Use caution when driving or operating machinery.  Must always keep the prescription in the original container.  Patient is to store controlled substances in a locked cabinet or other secure storage unit that is cool, dry and out of sunlight.  Patient must immediately notify office if this controlled substances is  improperly taken by another individual.  Reviewed risk for physical dependence, tolerance and addiction.       [facility] 7[facility]1-15  On asthma Rx and works well  Labs ordered  GERD is controlled on PPI Rx.   Since the last visit, she has overall felt fairly well.  She has Essential Hypertension and well controlled on current medication, GERD controlled on PPI Rx, Hyperlipidemia and working on this with diet and exercise, Seasonal allergies and doing well on their medication , Asthma well controlled and not requiring rescue Albuterol > 2 times a week and Vitamin D deficiency and will update labs for continued management.  she has been compliant with current medications have reviewed them.  The patient denies medication side effects.  Will refill medications. LMP  (LMP Unknown)     Results for orders placed or performed in visit on 10/16/19   Comprehensive Metabolic Panel   Result Value Ref Range    Glucose 84 65 - 99 mg/dL    BUN 18 6 - 20 mg/dL    Creatinine 0.75 0.57 - 1.00 mg/dL    eGFR Non African Am 84 >60 mL/min/1.73    eGFR African Am 101 >60 mL/min/1.73    BUN/Creatinine Ratio 24.0 7.0 - 25.0    Sodium 138 136 - 145 mmol/L    Potassium 4.2 3.5 - 5.2 mmol/L    Chloride 100 98 - 107 mmol/L    Total CO2 24.6 22.0 - 29.0 mmol/L    Calcium 9.7 8.6 - 10.5 mg/dL    Total Protein 7.9 6.0 - 8.5 g/dL    Albumin 5.30 (H) 3.50 - 5.20 g/dL    Globulin 2.6 gm/dL    A/G Ratio 2.0 g/dL    Total Bilirubin 0.6 0.2 - 1.2 mg/dL    Alkaline Phosphatase 64  39 - 117 U/L    AST (SGOT) 13 1 - 32 U/L    ALT (SGPT) 21 1 - 33 U/L   Lipid Panel   Result Value Ref Range    Total Cholesterol 202 (H) 0 - 200 mg/dL    Triglycerides 49 0 - 150 mg/dL    HDL Cholesterol 87 (H) 40 - 60 mg/dL    VLDL Cholesterol 9.8 mg/dL    LDL Cholesterol  105 (H) 0 - 100 mg/dL   T4, Free   Result Value Ref Range    Free T4 1.19 0.93 - 1.70 ng/dL   Folate   Result Value Ref Range    Folate 10.70 4.78 - 24.20 ng/mL   TSH   Result Value Ref Range    TSH 2.650 0.270 - 4.200 uIU/mL   Vitamin D 25 Hydroxy   Result Value Ref Range    25 Hydroxy, Vitamin D 35.3 30.0 - 100.0 ng/ml   Vitamin B12   Result Value Ref Range    Vitamin B-12 394 211 - 946 pg/mL   T3, Free   Result Value Ref Range    T3, Free 2.4 2.0 - 4.4 pg/mL   CBC & Differential   Result Value Ref Range    WBC 6.48 3.40 - 10.80 10*3/mm3    RBC 4.41 3.77 - 5.28 10*6/mm3    Hemoglobin 14.2 12.0 - 15.9 g/dL    Hematocrit 40.7 34.0 - 46.6 %    MCV 92.3 79.0 - 97.0 fL    MCH 32.2 26.6 - 33.0 pg    MCHC 34.9 31.5 - 35.7 g/dL    RDW 12.1 (L) 12.3 - 15.4 %    Platelets 378 140 - 450 10*3/mm3    Neutrophil Rel % 71.6 42.7 - 76.0 %    Lymphocyte Rel % 19.9 19.6 - 45.3 %    Monocyte Rel % 6.2 5.0 - 12.0 %    Eosinophil Rel % 1.2 0.3 - 6.2 %    Basophil Rel % 0.8 0.0 - 1.5 %    Neutrophils Absolute 4.64 1.70 - 7.00 10*3/mm3    Lymphocytes Absolute 1.29 0.70 - 3.10 10*3/mm3    Monocytes Absolute 0.40 0.10 - 0.90 10*3/mm3    Eosinophils Absolute 0.08 0.00 - 0.40 10*3/mm3    Basophils Absolute 0.05 0.00 - 0.20 10*3/mm3    Immature Granulocyte Rel % 0.3 0.0 - 0.5 %    Immature Grans Absolute 0.02 0.00 - 0.05 10*3/mm3    nRBC 0.0 0.0 - 0.2 /100 WBC     Angioedema on ACE  Migraine---OTC works  Will get mammo  Weight is about the same  The following portions of the patient's history were reviewed and updated as appropriate: allergies, current medications, past family history, past medical history, past social history, past surgical history and problem  list.    Review of Systems   Constitutional: Negative for activity change, appetite change and fever.   HENT: Negative for nosebleeds and trouble swallowing.    Eyes: Negative for visual disturbance.   Respiratory: Negative for choking and stridor.    Cardiovascular: Negative for chest pain.   Gastrointestinal: Negative for blood in stool.   Endocrine: Negative for polydipsia.   Genitourinary: Negative for genital sores and hematuria.   Musculoskeletal: Negative for joint swelling.   Skin: Negative for color change and rash.   Allergic/Immunologic: Negative for immunocompromised state.   Neurological: Negative for seizures, facial asymmetry and speech difficulty.   Hematological: Negative for adenopathy.   Psychiatric/Behavioral: Positive for decreased concentration. Negative for behavioral problems, dysphoric mood, self-injury and suicidal ideas.       Objective   Physical Exam  Constitutional:       General: She is not in acute distress.     Appearance: She is well-developed. She is not diaphoretic.   HENT:      Head: Normocephalic and atraumatic.   Eyes:      Conjunctiva/sclera: Conjunctivae normal.   Neck:      Musculoskeletal: Normal range of motion.   Pulmonary:      Effort: Pulmonary effort is normal. No respiratory distress.      Breath sounds: No stridor.   Musculoskeletal: Normal range of motion.   Skin:     General: Skin is dry.   Neurological:      General: No focal deficit present.      Mental Status: She is alert and oriented to person, place, and time.      Coordination: Coordination normal.   Psychiatric:         Mood and Affect: Mood normal.         Behavior: Behavior normal.         Thought Content: Thought content normal.         Judgment: Judgment normal.         Assessment/Plan   Diagnoses and all orders for this visit:    1. Essential hypertension (Primary)    2. Moderate persistent asthma without complication    3. Gastroesophageal reflux disease without esophagitis    4. Attention deficit  disorder (ADD) without hyperactivity    5. Chronic allergic rhinitis    6. Vitamin D deficiency    7. Hyperlipidemia, mixed      Plan, Lissette Hawthorne, was seen today.  she was seen for HTN and continue medication, GERD and will continue on PPI medication, Hyperlipidemia and will work on this with diet and exercise, Seasonal allergies and is doing well on their medication PRN, Asthma and is well controlled on medication.   and Vitamin D deficiency and will update labs .  Getting labs  Cont Adderall for ADD  mammo  Let me know if want colon cancer screen       Answers for HPI/ROS submitted by the patient on 1/31/2021   What is the primary reason for your visit?: Other  Please describe your symptoms.: 3 month follow up for ADD  Have you had these symptoms before?: Yes  How long have you been having these symptoms?: Greater than 2 weeks  Please list any medications you are currently taking for this condition.: Adderall

## 2021-02-15 ENCOUNTER — APPOINTMENT (OUTPATIENT)
Dept: WOMENS IMAGING | Facility: HOSPITAL | Age: 46
End: 2021-02-15

## 2021-02-15 PROCEDURE — 77067 SCR MAMMO BI INCL CAD: CPT | Performed by: RADIOLOGY

## 2021-02-15 PROCEDURE — 77063 BREAST TOMOSYNTHESIS BI: CPT | Performed by: RADIOLOGY

## 2021-03-01 RX ORDER — DEXTROAMPHETAMINE SACCHARATE, AMPHETAMINE ASPARTATE, DEXTROAMPHETAMINE SULFATE AND AMPHETAMINE SULFATE 7.5; 7.5; 7.5; 7.5 MG/1; MG/1; MG/1; MG/1
30 TABLET ORAL 2 TIMES DAILY
Qty: 60 TABLET | Refills: 0 | Status: SHIPPED | OUTPATIENT
Start: 2021-03-01 | End: 2021-04-01 | Stop reason: SDUPTHER

## 2021-03-01 NOTE — TELEPHONE ENCOUNTER
----- Message from Lissette Hawthorne sent at 3/1/2021  6:34 AM EST -----  Regarding: Prescription Question  Contact: [phone]  Good Morning,    Can you please send my Adderall prescription to pharmacy.     Thank you.

## 2021-04-01 RX ORDER — DEXTROAMPHETAMINE SACCHARATE, AMPHETAMINE ASPARTATE, DEXTROAMPHETAMINE SULFATE AND AMPHETAMINE SULFATE 7.5; 7.5; 7.5; 7.5 MG/1; MG/1; MG/1; MG/1
30 TABLET ORAL 2 TIMES DAILY
Qty: 60 TABLET | Refills: 0 | Status: SHIPPED | OUTPATIENT
Start: 2021-04-01 | End: 2021-05-04 | Stop reason: SDUPTHER

## 2021-04-01 NOTE — TELEPHONE ENCOUNTER
----- Message from Lissette Hawthorne sent at 4/1/2021 10:18 AM EDT -----  Regarding: Prescription Question  Contact: [phone]  Good Morning,    Will you please send my Adderall prescription in.     Thank you!

## 2021-04-02 ENCOUNTER — BULK ORDERING (OUTPATIENT)
Dept: CASE MANAGEMENT | Facility: OTHER | Age: 46
End: 2021-04-02

## 2021-04-02 DIAGNOSIS — Z23 IMMUNIZATION DUE: ICD-10-CM

## 2021-05-04 ENCOUNTER — TELEMEDICINE (OUTPATIENT)
Dept: FAMILY MEDICINE CLINIC | Facility: CLINIC | Age: 46
End: 2021-05-04

## 2021-05-04 DIAGNOSIS — F98.8 ATTENTION DEFICIT DISORDER (ADD) WITHOUT HYPERACTIVITY: Primary | ICD-10-CM

## 2021-05-04 DIAGNOSIS — I10 ESSENTIAL HYPERTENSION: ICD-10-CM

## 2021-05-04 PROCEDURE — 99213 OFFICE O/P EST LOW 20 MIN: CPT | Performed by: PHYSICIAN ASSISTANT

## 2021-05-04 RX ORDER — ALBUTEROL SULFATE 90 UG/1
2 AEROSOL, METERED RESPIRATORY (INHALATION) EVERY 4 HOURS PRN
Qty: 54 G | Refills: 3 | Status: SHIPPED | OUTPATIENT
Start: 2021-05-04 | End: 2021-08-17 | Stop reason: SDUPTHER

## 2021-05-04 RX ORDER — DEXTROAMPHETAMINE SACCHARATE, AMPHETAMINE ASPARTATE, DEXTROAMPHETAMINE SULFATE AND AMPHETAMINE SULFATE 7.5; 7.5; 7.5; 7.5 MG/1; MG/1; MG/1; MG/1
30 TABLET ORAL 2 TIMES DAILY
Qty: 60 TABLET | Refills: 0 | Status: SHIPPED | OUTPATIENT
Start: 2021-05-04 | End: 2021-06-02 | Stop reason: SDUPTHER

## 2021-05-04 NOTE — PATIENT INSTRUCTIONS
Attention Deficit Hyperactivity Disorder, Adult  Attention deficit hyperactivity disorder (ADHD) is a mental health disorder that starts during childhood (neurodevelopmental disorder). For many people with ADHD, the disorder continues into the adult years. Treatment can help you manage your symptoms.  What are the causes?  The exact cause of ADHD is not known. Most experts believe genetics and environmental factors contribute to ADHD.  What increases the risk?  The following factors may make you more likely to develop this condition:  · Having a family history of ADHD.  · Being male.  · Being born to a mother who smoked or drank alcohol during pregnancy.  · Being exposed to lead or other toxins in the womb or early in life.  · Being born before 37 weeks of pregnancy (prematurely) or at a low birth weight.  · Having experienced a brain injury.  What are the signs or symptoms?  Symptoms of this condition depend on the type of ADHD. The two main types are inattentive and hyperactive-impulsive. Some people may have symptoms of both types.  Symptoms of the inattentive type include:  · Difficulty paying attention.  · Making careless mistakes.  · Not following instructions.  · Being disorganized.  · Avoiding tasks that require time and attention.  · Losing and forgetting things.  · Being easily distracted.  Symptoms of the hyperactive-impulsive type include:  · Restlessness.  · Talking too much.  · Interrupting.  · Difficulty with:  ? Sitting still.  ? Feeling motivated.  ? Relaxing.  ? Waiting in line or waiting for a turn.  In adults, this condition may lead to certain problems, such as:  · Keeping jobs.  · Performing tasks at work.  · Having stable relationships.  · Being on time or keeping to a schedule.  How is this diagnosed?  This condition is diagnosed based on your current symptoms and your history of symptoms. The diagnosis can be made by a health care provider such as a primary care provider or a mental health  care specialist.  Your health care provider may use a symptom checklist or a behavior rating scale to evaluate your symptoms. He or she may also want to talk with people who have observed your behaviors throughout your life.  How is this treated?  This condition can be treated with medicines and behavior therapy. Medicines may be the best option to reduce impulsive behaviors and improve attention. Your health care provider may recommend:  · Stimulant medicines. These are the most common medicines used for adult ADHD. They affect certain chemicals in the brain (neurotransmitters) and improve your ability to control your symptoms.  · A non-stimulant medicine for adult ADHD (atomoxetine). This medicine increases a neurotransmitter called norepinephrine. It may take weeks to months to see effects from this medicine.  Counseling and behavioral management are also important for treating ADHD. Counseling is often used along with medicine. Your health care provider may suggest:  · Cognitive behavioral therapy (CBT). This type of therapy teaches you to replace negative thoughts and actions with positive thoughts and actions. When used as part of ADHD treatment, this therapy may also include:  ? Coping strategies for organization, time management, impulse control, and stress reduction.  ? Mindfulness and meditation training.  · Behavioral management. You may work with a  who is specially trained to help people with ADHD manage and organize activities and function more effectively.  Follow these instructions at home:  Medicines    · Take over-the-counter and prescription medicines only as told by your health care provider.  · Talk with your health care provider about the possible side effects of your medicines and how to manage them.  Lifestyle    · Do not use drugs.  · Do not drink alcohol if:  ? Your health care provider tells you not to drink.  ? You are pregnant, may be pregnant, or are planning to become  pregnant.  · If you drink alcohol:  ? Limit how much you use to:  § 0-1 drink a day for women.  § 0-2 drinks a day for men.  ? Be aware of how much alcohol is in your drink. In the [place] one drink equals one 12 oz bottle of beer (355 mL), one 5 oz glass of wine (148 mL), or one 1½ oz glass of hard liquor (44 mL).  · Get enough sleep.  · Eat a healthy diet.  · Exercise regularly. Exercise can help to reduce stress and anxiety.  General instructions  · Learn as much as you can about adult ADHD, and work closely with your health care providers to find the treatments that work best for you.  · Follow the same schedule each day.  · Use reminder devices like notes, calendars, and phone apps to stay on time and organized.  · Keep all follow-up visits as told by your health care provider and therapist. This is important.  Where to find more information  A health care provider may be able to recommend resources that are available online or over the phone. You could start with:  · Attention Deficit Disorder Association (ADDA): www.add.org  · National [place] of Mental Health (NIM): www.nimh.nih.gov  Contact a health care provider if:  · Your symptoms continue to cause problems.  · You have side effects from your medicine, such as:  ? Repeated muscle twitches, coughing, or speech outbursts.  ? Sleep problems.  ? Loss of appetite.  ? Dizziness.  ? Unusually fast heartbeat.  ? Stomach pains.  ? Headaches.  · You are struggling with anxiety, depression, or substance abuse.  Get help right away if you:  · Have a severe reaction to a medicine.  If you ever feel like you may hurt yourself or others, or have thoughts about taking your own life, get help right away. You can go to the nearest emergency department or call:  · Your local emergency services (911 in the [place]  · A suicide crisis helpline, such as the National Suicide Prevention Lifeline at 1-467.219.7329. This is open 24 hours a day.  Summary  · ADHD is a mental health  disorder that starts during childhood (neurodevelopmental disorder) and often continues into the adult years.  · The exact cause of ADHD is not known. Most experts believe genetics and environmental factors contribute to ADHD.  · There is no cure for ADHD, but treatment with medicine, cognitive behavioral therapy, or behavioral management can help you manage your condition.  This information is not intended to replace advice given to you by your health care provider. Make sure you discuss any questions you have with your health care provider.  Document Revised: 05/11/2020 Document Reviewed: 05/11/2020  [employer] Patient Education © 2021 [employer]

## 2021-05-26 RX ORDER — ESOMEPRAZOLE MAGNESIUM 40 MG/1
CAPSULE, DELAYED RELEASE ORAL
Qty: 90 CAPSULE | Refills: 3 | Status: SHIPPED | OUTPATIENT
Start: 2021-05-26 | End: 2022-05-17 | Stop reason: SDUPTHER

## 2021-06-03 RX ORDER — DEXTROAMPHETAMINE SACCHARATE, AMPHETAMINE ASPARTATE, DEXTROAMPHETAMINE SULFATE AND AMPHETAMINE SULFATE 7.5; 7.5; 7.5; 7.5 MG/1; MG/1; MG/1; MG/1
30 TABLET ORAL 2 TIMES DAILY
Qty: 60 TABLET | Refills: 0 | Status: SHIPPED | OUTPATIENT
Start: 2021-06-03 | End: 2021-07-02 | Stop reason: SDUPTHER

## 2021-06-11 DIAGNOSIS — H53.9 IMPAIRED VISUAL PERCEPTION: ICD-10-CM

## 2021-06-11 DIAGNOSIS — F98.8 ATTENTION DEFICIT DISORDER (ADD) WITHOUT HYPERACTIVITY: Primary | ICD-10-CM

## 2021-06-11 DIAGNOSIS — N95.1 MENOPAUSAL SYMPTOMS: ICD-10-CM

## 2021-06-11 DIAGNOSIS — J45.40 MODERATE PERSISTENT ASTHMA WITHOUT COMPLICATION: ICD-10-CM

## 2021-06-11 DIAGNOSIS — I10 ESSENTIAL HYPERTENSION: ICD-10-CM

## 2021-06-11 DIAGNOSIS — E55.9 VITAMIN D DEFICIENCY: ICD-10-CM

## 2021-06-11 DIAGNOSIS — E78.2 HYPERLIPIDEMIA, MIXED: ICD-10-CM

## 2021-06-11 DIAGNOSIS — K21.9 GASTROESOPHAGEAL REFLUX DISEASE WITHOUT ESOPHAGITIS: ICD-10-CM

## 2021-07-02 RX ORDER — DEXTROAMPHETAMINE SACCHARATE, AMPHETAMINE ASPARTATE, DEXTROAMPHETAMINE SULFATE AND AMPHETAMINE SULFATE 7.5; 7.5; 7.5; 7.5 MG/1; MG/1; MG/1; MG/1
30 TABLET ORAL 2 TIMES DAILY
Qty: 60 TABLET | Refills: 0 | Status: SHIPPED | OUTPATIENT
Start: 2021-07-02 | End: 2021-08-17 | Stop reason: SDUPTHER

## 2021-07-02 NOTE — TELEPHONE ENCOUNTER
----- Message from Lissette Hawthorne sent at 7/2/2021  7:45 AM EDT -----  Regarding: Prescription Question  Contact: [phone]  Momo Costello,    Will you please send my Adderall prescription to the pharmacy.     Thank you.   Terbinafine Pregnancy And Lactation Text: This medication is Pregnancy Category B and is considered safe during pregnancy. It is also excreted in breast milk and breast feeding isn't recommended.

## 2021-07-13 RX ORDER — MONTELUKAST SODIUM 10 MG/1
TABLET ORAL
Qty: 90 TABLET | Refills: 3 | Status: SHIPPED | OUTPATIENT
Start: 2021-07-13 | End: 2022-07-20

## 2021-08-03 RX ORDER — DEXTROAMPHETAMINE SACCHARATE, AMPHETAMINE ASPARTATE, DEXTROAMPHETAMINE SULFATE AND AMPHETAMINE SULFATE 7.5; 7.5; 7.5; 7.5 MG/1; MG/1; MG/1; MG/1
30 TABLET ORAL 2 TIMES DAILY
Qty: 60 TABLET | Refills: 0 | OUTPATIENT
Start: 2021-08-03

## 2021-08-03 NOTE — TELEPHONE ENCOUNTER
Caller: Lissette Hawthorne    Relationship: Self    Best call back number: [phone]    Medication needed:   Requested Prescriptions     Pending Prescriptions Disp Refills   • amphetamine-dextroamphetamine (Adderall) 30 MG tablet 60 tablet 0     Sig: Take 1 tablet by mouth 2 (Two) Times a Day. For ADD       When do you need the refill by: ASAP    Does the patient have less than a 3 day supply:  [x] Yes  [] No    What is the patient's preferred pharmacy: [facility] [address] [address] [phone] [facility] [phone] FX

## 2021-08-17 ENCOUNTER — OFFICE VISIT (OUTPATIENT)
Dept: FAMILY MEDICINE CLINIC | Facility: CLINIC | Age: 46
End: 2021-08-17

## 2021-08-17 VITALS
DIASTOLIC BLOOD PRESSURE: 70 MMHG | HEIGHT: 62 IN | OXYGEN SATURATION: 100 % | HEART RATE: 89 BPM | BODY MASS INDEX: 27.64 KG/M2 | SYSTOLIC BLOOD PRESSURE: 124 MMHG | RESPIRATION RATE: 16 BRPM | WEIGHT: 150.2 LBS | TEMPERATURE: 97.5 F

## 2021-08-17 DIAGNOSIS — H53.9 IMPAIRED VISUAL PERCEPTION: ICD-10-CM

## 2021-08-17 DIAGNOSIS — E55.9 VITAMIN D DEFICIENCY: ICD-10-CM

## 2021-08-17 DIAGNOSIS — G43.009 MIGRAINE WITHOUT AURA AND WITHOUT STATUS MIGRAINOSUS, NOT INTRACTABLE: ICD-10-CM

## 2021-08-17 DIAGNOSIS — K21.9 GASTROESOPHAGEAL REFLUX DISEASE WITHOUT ESOPHAGITIS: ICD-10-CM

## 2021-08-17 DIAGNOSIS — F98.8 ATTENTION DEFICIT DISORDER (ADD) WITHOUT HYPERACTIVITY: ICD-10-CM

## 2021-08-17 DIAGNOSIS — J45.40 MODERATE PERSISTENT ASTHMA WITHOUT COMPLICATION: ICD-10-CM

## 2021-08-17 DIAGNOSIS — J30.9 CHRONIC ALLERGIC RHINITIS: ICD-10-CM

## 2021-08-17 DIAGNOSIS — I10 ESSENTIAL HYPERTENSION: Primary | ICD-10-CM

## 2021-08-17 DIAGNOSIS — Z12.11 SCREEN FOR COLON CANCER: ICD-10-CM

## 2021-08-17 PROCEDURE — 99214 OFFICE O/P EST MOD 30 MIN: CPT | Performed by: PHYSICIAN ASSISTANT

## 2021-08-17 RX ORDER — ALBUTEROL SULFATE 90 UG/1
2 AEROSOL, METERED RESPIRATORY (INHALATION) EVERY 4 HOURS PRN
Qty: 54 G | Refills: 3 | Status: SHIPPED | OUTPATIENT
Start: 2021-08-17 | End: 2022-06-12

## 2021-08-17 RX ORDER — AMLODIPINE BESYLATE 5 MG/1
5 TABLET ORAL DAILY
Qty: 90 TABLET | Refills: 1 | Status: SHIPPED | OUTPATIENT
Start: 2021-08-17 | End: 2022-01-22

## 2021-08-17 RX ORDER — DEXTROAMPHETAMINE SACCHARATE, AMPHETAMINE ASPARTATE, DEXTROAMPHETAMINE SULFATE AND AMPHETAMINE SULFATE 7.5; 7.5; 7.5; 7.5 MG/1; MG/1; MG/1; MG/1
30 TABLET ORAL 2 TIMES DAILY
Qty: 60 TABLET | Refills: 0 | Status: SHIPPED | OUTPATIENT
Start: 2021-08-17 | End: 2021-09-16 | Stop reason: SDUPTHER

## 2021-08-17 RX ORDER — ALBUTEROL SULFATE 2.5 MG/3ML
2.5 SOLUTION RESPIRATORY (INHALATION) EVERY 4 HOURS PRN
Qty: 120 EACH | Refills: 3 | Status: SHIPPED | OUTPATIENT
Start: 2021-08-17

## 2021-08-17 NOTE — PROGRESS NOTES
"Subjective   Lissette Hawthorne is a 46 y.o. female.     History of Present Illness    Since the last visit, she has overall felt fairly well.  She has GERD controlled on PPI Rx, Hyperlipidemia and working on this with diet and exercise, Seasonal allergies and doing well on their medication , Asthma well controlled and not requiring rescue Albuterol > 2 times a week and Vitamin D deficiency and will update labs for continued management.  she has been compliant with current medications have reviewed them.  The patient denies medication side effects.  Will refill medications. /65 (BP Location: Left arm, Patient Position: Sitting, Cuff Size: Adult)   Pulse 89   Temp 97.5 °F (36.4 °C)   Resp 16   Ht 157.5 cm (62.01\")   Wt 68.1 kg (150 lb 3.2 oz)   LMP  (LMP Unknown)   SpO2 100%   BMI 27.46 kg/m²   Has migraines---takes OTC  Wants labs for menopause    Results for orders placed or performed in visit on 10/16/19   Comprehensive Metabolic Panel   Result Value Ref Range    Glucose 84 65 - 99 mg/dL    BUN 18 6 - 20 mg/dL    Creatinine 0.75 0.57 - 1.00 mg/dL    eGFR Non African Am 84 >60 mL/min/1.73    eGFR African Am 101 >60 mL/min/1.73    BUN/Creatinine Ratio 24.0 7.0 - 25.0    Sodium 138 136 - 145 mmol/L    Potassium 4.2 3.5 - 5.2 mmol/L    Chloride 100 98 - 107 mmol/L    Total CO2 24.6 22.0 - 29.0 mmol/L    Calcium 9.7 8.6 - 10.5 mg/dL    Total Protein 7.9 6.0 - 8.5 g/dL    Albumin 5.30 (H) 3.50 - 5.20 g/dL    Globulin 2.6 gm/dL    A/G Ratio 2.0 g/dL    Total Bilirubin 0.6 0.2 - 1.2 mg/dL    Alkaline Phosphatase 64 39 - 117 U/L    AST (SGOT) 13 1 - 32 U/L    ALT (SGPT) 21 1 - 33 U/L   Lipid Panel   Result Value Ref Range    Total Cholesterol 202 (H) 0 - 200 mg/dL    Triglycerides 49 0 - 150 mg/dL    HDL Cholesterol 87 (H) 40 - 60 mg/dL    VLDL Cholesterol 9.8 mg/dL    LDL Cholesterol  105 (H) 0 - 100 mg/dL   T4, Free   Result Value Ref Range    Free T4 1.19 0.93 - 1.70 ng/dL   Folate   Result Value Ref Range "    Folate 10.70 4.78 - 24.20 ng/mL   TSH   Result Value Ref Range    TSH 2.650 0.270 - 4.200 uIU/mL   Vitamin D 25 Hydroxy   Result Value Ref Range    25 Hydroxy, Vitamin D 35.3 30.0 - 100.0 ng/ml   Vitamin B12   Result Value Ref Range    Vitamin B-12 394 211 - 946 pg/mL   T3, Free   Result Value Ref Range    T3, Free 2.4 2.0 - 4.4 pg/mL   CBC & Differential   Result Value Ref Range    WBC 6.48 3.40 - 10.80 10*3/mm3    RBC 4.41 3.77 - 5.28 10*6/mm3    Hemoglobin 14.2 12.0 - 15.9 g/dL    Hematocrit 40.7 34.0 - 46.6 %    MCV 92.3 79.0 - 97.0 fL    MCH 32.2 26.6 - 33.0 pg    MCHC 34.9 31.5 - 35.7 g/dL    RDW 12.1 (L) 12.3 - 15.4 %    Platelets 378 140 - 450 10*3/mm3    Neutrophil Rel % 71.6 42.7 - 76.0 %    Lymphocyte Rel % 19.9 19.6 - 45.3 %    Monocyte Rel % 6.2 5.0 - 12.0 %    Eosinophil Rel % 1.2 0.3 - 6.2 %    Basophil Rel % 0.8 0.0 - 1.5 %    Neutrophils Absolute 4.64 1.70 - 7.00 10*3/mm3    Lymphocytes Absolute 1.29 0.70 - 3.10 10*3/mm3    Monocytes Absolute 0.40 0.10 - 0.90 10*3/mm3    Eosinophils Absolute 0.08 0.00 - 0.40 10*3/mm3    Basophils Absolute 0.05 0.00 - 0.20 10*3/mm3    Immature Granulocyte Rel % 0.3 0.0 - 0.5 %    Immature Grans Absolute 0.02 0.00 - 0.05 10*3/mm3    nRBC 0.0 0.0 - 0.2 /100 WBC     Lissette HUNTER Marine 46 y.o. female who presents for ADD/ADHD follow up. They are well controlled on their current Rx.  No new problems with insomnia, tachycardia, chest pain, or weight loss.---she has been working hard on losing weight for last few years. The patient has read and signed the [facility] Controlled Substance Contract.  I will continue to see patient for regular follow up appointments and give the lowest effective dose.  They are well controlled on their medication at the lowest effective dose.  She has a previous history of a diagnosis of  ADD/ADHD.   ASIF has been reviewed by me and is updated every 3 months. The patient is aware of the potential for addiction and dependence.  The Rx  continues to help them concentrate, finish tasks in timely manor, and succeed.  She denies current suicidal and homicidal ideation.  Reviewed causes for potential of discontinuation of stimulant medication,  including but not limited to consideration for diversion, obtaining other controlled substances from other license practitioners, or  inappropriate office behavior.  Patient understands to use a controlled substance as prescribed by physician and to avoid improper use of controlled substances.  Patient will also verbalized understanding that prescriptions to be filled at the same pharmacy  unless office staff is made aware of this.  Patient understands they can be submitted to random urine drug screens and/or random pill counts on any request.  Patient understands they are not to receive early refills.  The patient must produce an official police report for any effort to replace controlled substances that are lost or stolen.  No use of illegal street drugs while receiving controlled substances from this prescribing provider.  The patient is to take medication as prescribed  and not deviate from the normal schedule without consultation from the provider.  Patient is not to share the medication with others or to take medication with alcohol or other sedatives.  Use caution when driving or operating machinery.  Must always keep the prescription in the original container.  Patient is to store controlled substances in a locked cabinet or other secure storage unit that is cool, dry and out of sunlight.  Patient must immediately notify office if this controlled substances is  improperly taken by another individual.  Reviewed risk for physical dependence, tolerance and addiction.    There is no evidence of aberrant behavior or any red flags.  .     [facility] eval 7-1-15  The following portions of the patient's history were reviewed and updated as appropriate: allergies, current medications, past  family history, past medical history, past social history, past surgical history and problem list.    Review of Systems   Constitutional: Negative for activity change, appetite change and unexpected weight change.   HENT: Negative for nosebleeds and trouble swallowing.    Eyes: Negative for pain and visual disturbance.   Respiratory: Negative for chest tightness, shortness of breath and wheezing.    Cardiovascular: Negative for chest pain and palpitations.   Gastrointestinal: Negative for abdominal pain and blood in stool.   Endocrine: Negative.    Genitourinary: Negative for difficulty urinating and hematuria.   Musculoskeletal: Negative for joint swelling.   Skin: Negative for color change and rash.   Allergic/Immunologic: Negative.    Neurological: Negative for syncope and speech difficulty.   Hematological: Negative for adenopathy.   Psychiatric/Behavioral: Positive for decreased concentration. Negative for agitation and confusion.   All other systems reviewed and are negative.      Objective   Physical Exam  Vitals and nursing note reviewed.   Constitutional:       General: She is not in acute distress.     Appearance: Normal appearance. She is well-developed. She is not ill-appearing or toxic-appearing.   HENT:      Head: Normocephalic.      Right Ear: External ear normal.      Left Ear: External ear normal.      Nose: Nose normal.      Mouth/Throat:      Pharynx: Oropharynx is clear.   Eyes:      General: No scleral icterus.     Conjunctiva/sclera: Conjunctivae normal.      Pupils: Pupils are equal, round, and reactive to light.   Neck:      Thyroid: No thyromegaly.      Vascular: No carotid bruit.   Cardiovascular:      Rate and Rhythm: Normal rate and regular rhythm.      Pulses: Normal pulses.      Heart sounds: Normal heart sounds. No murmur heard.     Pulmonary:      Effort: Pulmonary effort is normal. No respiratory distress.      Breath sounds: Normal breath sounds. No rales.   Musculoskeletal:          General: No deformity. Normal range of motion.      Cervical back: Normal range of motion and neck supple.      Right lower leg: No edema.      Left lower leg: No edema.   Skin:     General: Skin is warm and dry.      Coloration: Skin is not jaundiced.      Findings: No rash.   Neurological:      General: No focal deficit present.      Mental Status: She is alert and oriented to person, place, and time. Mental status is at baseline.   Psychiatric:         Mood and Affect: Mood normal.         Behavior: Behavior normal.         Thought Content: Thought content normal.         Judgment: Judgment normal.         Assessment/Plan   Diagnoses and all orders for this visit:    1. Essential hypertension (Primary)    2. Attention deficit disorder (ADD) without hyperactivity    3. Impaired visual perception    4. Chronic allergic rhinitis    5. Migraine without aura and without status migrainosus, not intractable    6. Moderate persistent asthma without complication    7. Gastroesophageal reflux disease without esophagitis    Other orders  -     albuterol (PROVENTIL) (2.5 MG/3ML) 0.083% nebulizer solution; Take 2.5 mg by nebulization Every 4 (Four) Hours As Needed for Wheezing.  Dispense: 120 each; Refill: 3  -     albuterol sulfate HFA (ProAir HFA) 108 (90 Base) MCG/ACT inhaler; Inhale 2 puffs Every 4 (Four) Hours As Needed for Wheezing.  Dispense: 54 g; Refill: 3  -     Fluticasone Furoate-Vilanterol (Breo Ellipta) 100-25 MCG/INH inhaler; Inhale 1 puff Daily. For asthma and rinse mouth after use  Dispense: 60 each; Refill: 11  -     amLODIPine (NORVASC) 5 MG tablet; Take 1 tablet by mouth Daily. One PO daily for BP;  Dispense: 90 tablet; Refill: 1      Plan, Lissette Hawthorne, was seen today.  she was seen for GERD and will continue on PPI medication, Hyperlipidemia and will work on this with diet and exercise, Seasonal allergies and is doing well on their medication PRN, Asthma and is well controlled on medication.   and  Vitamin D deficiency and will update labs .  Takes OTC Motrin for migraines  Cont Adderall for ADD---works  Great job on weight  Labs today           Answers for HPI/ROS submitted by the patient on 8/17/2021  Please describe your symptoms.: 3 month follow up for ADD  Have you had these symptoms before?: Yes  How long have you been having these symptoms?: Greater than 2 weeks  Please list any medications you are currently taking for this condition.: Adderall  What is the primary reason for your visit?: Other

## 2021-08-18 LAB
25(OH)D3+25(OH)D2 SERPL-MCNC: 63.3 NG/ML (ref 30–100)
ALBUMIN SERPL-MCNC: 4.4 G/DL (ref 3.5–5.2)
ALBUMIN/GLOB SERPL: 1.6 G/DL
ALP SERPL-CCNC: 58 U/L (ref 39–117)
ALT SERPL-CCNC: 20 U/L (ref 1–33)
AST SERPL-CCNC: 18 U/L (ref 1–32)
BASOPHILS # BLD AUTO: 0.05 10*3/MM3 (ref 0–0.2)
BASOPHILS NFR BLD AUTO: 1 % (ref 0–1.5)
BILIRUB SERPL-MCNC: 0.4 MG/DL (ref 0–1.2)
BUN SERPL-MCNC: 20 MG/DL (ref 6–20)
BUN/CREAT SERPL: 26.7 (ref 7–25)
CALCIUM SERPL-MCNC: 9.5 MG/DL (ref 8.6–10.5)
CHLORIDE SERPL-SCNC: 102 MMOL/L (ref 98–107)
CHOLEST SERPL-MCNC: 182 MG/DL (ref 0–200)
CO2 SERPL-SCNC: 29 MMOL/L (ref 22–29)
CREAT SERPL-MCNC: 0.75 MG/DL (ref 0.57–1)
EOSINOPHIL # BLD AUTO: 0.17 10*3/MM3 (ref 0–0.4)
EOSINOPHIL NFR BLD AUTO: 3.4 % (ref 0.3–6.2)
ERYTHROCYTE [DISTWIDTH] IN BLOOD BY AUTOMATED COUNT: 12.4 % (ref 12.3–15.4)
FOLATE SERPL-MCNC: 7.89 NG/ML (ref 4.78–24.2)
FSH SERPL-ACNC: 13 MIU/ML
GLOBULIN SER CALC-MCNC: 2.7 GM/DL
GLUCOSE SERPL-MCNC: 72 MG/DL (ref 65–99)
HBA1C MFR BLD: 4.9 % (ref 4.8–5.6)
HCT VFR BLD AUTO: 40.9 % (ref 34–46.6)
HDLC SERPL-MCNC: 88 MG/DL (ref 40–60)
HGB BLD-MCNC: 13.3 G/DL (ref 12–15.9)
IMM GRANULOCYTES # BLD AUTO: 0.01 10*3/MM3 (ref 0–0.05)
IMM GRANULOCYTES NFR BLD AUTO: 0.2 % (ref 0–0.5)
LDLC SERPL CALC-MCNC: 87 MG/DL (ref 0–100)
LH SERPL-ACNC: 7.8 MIU/ML
LYMPHOCYTES # BLD AUTO: 1.17 10*3/MM3 (ref 0.7–3.1)
LYMPHOCYTES NFR BLD AUTO: 23.2 % (ref 19.6–45.3)
MCH RBC QN AUTO: 31.2 PG (ref 26.6–33)
MCHC RBC AUTO-ENTMCNC: 32.5 G/DL (ref 31.5–35.7)
MCV RBC AUTO: 96 FL (ref 79–97)
MONOCYTES # BLD AUTO: 0.38 10*3/MM3 (ref 0.1–0.9)
MONOCYTES NFR BLD AUTO: 7.5 % (ref 5–12)
NEUTROPHILS # BLD AUTO: 3.26 10*3/MM3 (ref 1.7–7)
NEUTROPHILS NFR BLD AUTO: 64.7 % (ref 42.7–76)
NRBC BLD AUTO-RTO: 0 /100 WBC (ref 0–0.2)
PLATELET # BLD AUTO: 336 10*3/MM3 (ref 140–450)
POTASSIUM SERPL-SCNC: 4.3 MMOL/L (ref 3.5–5.2)
PROT SERPL-MCNC: 7.1 G/DL (ref 6–8.5)
RBC # BLD AUTO: 4.26 10*6/MM3 (ref 3.77–5.28)
SODIUM SERPL-SCNC: 138 MMOL/L (ref 136–145)
T3FREE SERPL-MCNC: 2.4 PG/ML (ref 2–4.4)
T4 FREE SERPL-MCNC: 1.11 NG/DL (ref 0.93–1.7)
TRIGL SERPL-MCNC: 33 MG/DL (ref 0–150)
TSH SERPL DL<=0.005 MIU/L-ACNC: 1.71 UIU/ML (ref 0.27–4.2)
VIT B12 SERPL-MCNC: 414 PG/ML (ref 211–946)
VLDLC SERPL CALC-MCNC: 7 MG/DL (ref 5–40)
WBC # BLD AUTO: 5.04 10*3/MM3 (ref 3.4–10.8)

## 2021-09-16 NOTE — TELEPHONE ENCOUNTER
----- Message from Lissette Hawthorne sent at 9/16/2021  6:32 AM EDT -----  Regarding: Prescription Question  Contact: [phone]  Good morning,    Will you please send my Adderall prescription to [facility]. I only have 2 days  left.     Thank you.

## 2021-09-17 RX ORDER — DEXTROAMPHETAMINE SACCHARATE, AMPHETAMINE ASPARTATE, DEXTROAMPHETAMINE SULFATE AND AMPHETAMINE SULFATE 7.5; 7.5; 7.5; 7.5 MG/1; MG/1; MG/1; MG/1
30 TABLET ORAL 2 TIMES DAILY
Qty: 60 TABLET | Refills: 0 | Status: SHIPPED | OUTPATIENT
Start: 2021-09-17 | End: 2021-10-16 | Stop reason: SDUPTHER

## 2021-10-16 RX ORDER — DEXTROAMPHETAMINE SACCHARATE, AMPHETAMINE ASPARTATE, DEXTROAMPHETAMINE SULFATE AND AMPHETAMINE SULFATE 7.5; 7.5; 7.5; 7.5 MG/1; MG/1; MG/1; MG/1
30 TABLET ORAL 2 TIMES DAILY
Qty: 60 TABLET | Refills: 0 | Status: SHIPPED | OUTPATIENT
Start: 2021-10-16 | End: 2021-11-16 | Stop reason: SDUPTHER

## 2021-10-16 NOTE — TELEPHONE ENCOUNTER
----- Message from Lissette Hawthorne sent at 10/16/2021  8:01 AM EDT -----  Regarding: Prescription Question  Contact: [phone]  Good morning,    Will you please send my Adderall prescription to the pharmacy.     Thank you   Arina Ibanez

## 2021-11-16 ENCOUNTER — OFFICE VISIT (OUTPATIENT)
Dept: FAMILY MEDICINE CLINIC | Facility: CLINIC | Age: 46
End: 2021-11-16

## 2021-11-16 VITALS
SYSTOLIC BLOOD PRESSURE: 125 MMHG | OXYGEN SATURATION: 100 % | DIASTOLIC BLOOD PRESSURE: 72 MMHG | BODY MASS INDEX: 26.13 KG/M2 | HEIGHT: 62 IN | RESPIRATION RATE: 16 BRPM | HEART RATE: 92 BPM | WEIGHT: 142 LBS | TEMPERATURE: 97.5 F

## 2021-11-16 DIAGNOSIS — F98.8 ATTENTION DEFICIT DISORDER (ADD) WITHOUT HYPERACTIVITY: ICD-10-CM

## 2021-11-16 DIAGNOSIS — K21.9 GASTROESOPHAGEAL REFLUX DISEASE WITHOUT ESOPHAGITIS: ICD-10-CM

## 2021-11-16 DIAGNOSIS — I10 ESSENTIAL HYPERTENSION: Primary | ICD-10-CM

## 2021-11-16 DIAGNOSIS — J45.40 MODERATE PERSISTENT ASTHMA WITHOUT COMPLICATION: ICD-10-CM

## 2021-11-16 DIAGNOSIS — E55.9 VITAMIN D DEFICIENCY: ICD-10-CM

## 2021-11-16 DIAGNOSIS — H53.9 IMPAIRED VISUAL PERCEPTION: ICD-10-CM

## 2021-11-16 DIAGNOSIS — G43.009 MIGRAINE WITHOUT AURA AND WITHOUT STATUS MIGRAINOSUS, NOT INTRACTABLE: ICD-10-CM

## 2021-11-16 DIAGNOSIS — Z12.11 SCREEN FOR COLON CANCER: ICD-10-CM

## 2021-11-16 PROCEDURE — 99214 OFFICE O/P EST MOD 30 MIN: CPT | Performed by: PHYSICIAN ASSISTANT

## 2021-11-16 RX ORDER — DEXTROAMPHETAMINE SACCHARATE, AMPHETAMINE ASPARTATE, DEXTROAMPHETAMINE SULFATE AND AMPHETAMINE SULFATE 7.5; 7.5; 7.5; 7.5 MG/1; MG/1; MG/1; MG/1
30 TABLET ORAL 2 TIMES DAILY
Qty: 60 TABLET | Refills: 0 | Status: SHIPPED | OUTPATIENT
Start: 2021-11-16 | End: 2021-12-19 | Stop reason: SDUPTHER

## 2021-11-16 NOTE — PROGRESS NOTES
"Danielle Hawthorne is a 46 y.o. female.     History of Present Illness    Since the last visit, she has overall felt well.  She has Essential Hypertension and well controlled on current medication, GERD controlled on PPI Rx, Seasonal allergies and doing well on their medication , Asthma well controlled and not requiring rescue Albuterol > 2 times a week, Asthma and remains under care of their specialist and Vitamin D deficiency and labs are at goal >30 ng/mL.  she has been compliant with current medications have reviewed them.  The patient denies medication side effects.  Will refill medications. /72 (BP Location: Right arm, Patient Position: Sitting, Cuff Size: Adult)   Pulse 92   Temp 97.5 °F (36.4 °C)   Resp 16   Ht 157.5 cm (62.01\")   Wt 64.4 kg (142 lb)   LMP  (LMP Unknown)   SpO2 100%   BMI 25.97 kg/m²   Walking for exercise; no sugar and low carb  Failed H2 blocker    Results for orders placed or performed in visit on 06/11/21   Comprehensive metabolic panel    Specimen: Blood   Result Value Ref Range    Glucose 72 65 - 99 mg/dL    BUN 20 6 - 20 mg/dL    Creatinine 0.75 0.57 - 1.00 mg/dL    eGFR Non African Am 83 >60 mL/min/1.73    eGFR African Am 100 >60 mL/min/1.73    BUN/Creatinine Ratio 26.7 (H) 7.0 - 25.0    Sodium 138 136 - 145 mmol/L    Potassium 4.3 3.5 - 5.2 mmol/L    Chloride 102 98 - 107 mmol/L    Total CO2 29.0 22.0 - 29.0 mmol/L    Calcium 9.5 8.6 - 10.5 mg/dL    Total Protein 7.1 6.0 - 8.5 g/dL    Albumin 4.40 3.50 - 5.20 g/dL    Globulin 2.7 gm/dL    A/G Ratio 1.6 g/dL    Total Bilirubin 0.4 0.0 - 1.2 mg/dL    Alkaline Phosphatase 58 39 - 117 U/L    AST (SGOT) 18 1 - 32 U/L    ALT (SGPT) 20 1 - 33 U/L   Lipid panel    Specimen: Blood   Result Value Ref Range    Total Cholesterol 182 0 - 200 mg/dL    Triglycerides 33 0 - 150 mg/dL    HDL Cholesterol 88 (H) 40 - 60 mg/dL    VLDL Cholesterol Jose R 7 5 - 40 mg/dL    LDL Chol Calc ([facility]) 87 0 - 100 mg/dL   TSH    Specimen: " Blood   Result Value Ref Range    TSH 1.710 0.270 - 4.200 uIU/mL   T4, Free    Specimen: Blood   Result Value Ref Range    Free T4 1.11 0.93 - 1.70 ng/dL   T3, Free    Specimen: Blood   Result Value Ref Range    T3, Free 2.4 2.0 - 4.4 pg/mL   Vitamin B12    Specimen: Blood   Result Value Ref Range    Vitamin B-12 414 211 - 946 pg/mL   Folate    Specimen: Blood   Result Value Ref Range    Folate 7.89 4.78 - 24.20 ng/mL   Vitamin D 25 Hydroxy    Specimen: Blood   Result Value Ref Range    25 Hydroxy, Vitamin D 63.3 30.0 - 100.0 ng/ml   Hemoglobin A1c    Specimen: Blood   Result Value Ref Range    Hemoglobin A1C 4.90 4.80 - 5.60 %   FSH & LH    Specimen: Blood   Result Value Ref Range    LH 7.8 mIU/mL    FSH 13.0 mIU/mL   CBC and Differential    Specimen: Blood   Result Value Ref Range    WBC 5.04 3.40 - 10.80 10*3/mm3    RBC 4.26 3.77 - 5.28 10*6/mm3    Hemoglobin 13.3 12.0 - 15.9 g/dL    Hematocrit 40.9 34.0 - 46.6 %    MCV 96.0 79.0 - 97.0 fL    MCH 31.2 26.6 - 33.0 pg    MCHC 32.5 31.5 - 35.7 g/dL    RDW 12.4 12.3 - 15.4 %    Platelets 336 140 - 450 10*3/mm3    Neutrophil Rel % 64.7 42.7 - 76.0 %    Lymphocyte Rel % 23.2 19.6 - 45.3 %    Monocyte Rel % 7.5 5.0 - 12.0 %    Eosinophil Rel % 3.4 0.3 - 6.2 %    Basophil Rel % 1.0 0.0 - 1.5 %    Neutrophils Absolute 3.26 1.70 - 7.00 10*3/mm3    Lymphocytes Absolute 1.17 0.70 - 3.10 10*3/mm3    Monocytes Absolute 0.38 0.10 - 0.90 10*3/mm3    Eosinophils Absolute 0.17 0.00 - 0.40 10*3/mm3    Basophils Absolute 0.05 0.00 - 0.20 10*3/mm3    Immature Granulocyte Rel % 0.2 0.0 - 0.5 %    Immature Grans Absolute 0.01 0.00 - 0.05 10*3/mm3    nRBC 0.0 0.0 - 0.2 /100 WBC         Has migraines---takes OTC  Has seen Dr Cristino miller----asthma controlled on meds      [facility] eval 7-1-15    [facility] has her on Detrol for bladder urgency    Lissette Hawthorne 46 y.o. female who presents for ADD/ADHD follow up. They are well controlled on their current Rx.  No new  problems with insomnia, tachycardia, chest pain, or weight loss----this is not from Rx.  Working hard on diet and walking for exercise.. The patient has read and signed the [facility] Controlled Substance Contract.  I will continue to see patient for regular follow up appointments and give the lowest effective dose.  They are well controlled on their medication at the lowest effective dose.  She has a previous history of a diagnosis of  ADD/ADHD.   ASIF has been reviewed by me and is updated every 3 months. The patient is aware of the potential for addiction and dependence.  The Rx continues to help them concentrate, finish tasks in timely manor, and succeed.  She denies current suicidal and homicidal ideation.  Reviewed causes for potential of discontinuation of stimulant medication,  including but not limited to consideration for diversion, obtaining other controlled substances from other license practitioners, or  inappropriate office behavior.  Patient understands to use a controlled substance as prescribed by physician and to avoid improper use of controlled substances.  Patient will also verbalized understanding that prescriptions to be filled at the same pharmacy  unless office staff is made aware of this.  Patient understands they can be submitted to random urine drug screens and/or random pill counts on any request.  Patient understands they are not to receive early refills.  The patient must produce an official police report for any effort to replace controlled substances that are lost or stolen.  No use of illegal street drugs while receiving controlled substances from this prescribing provider.  The patient is to take medication as prescribed  and not deviate from the normal schedule without consultation from the provider.  Patient is not to share the medication with others or to take medication with alcohol or other sedatives.  Use caution when driving or operating machinery.  Must always keep the  prescription in the original container.  Patient is to store controlled substances in a locked cabinet or other secure storage unit that is cool, dry and out of sunlight.  Patient must immediately notify office if this controlled substances is  improperly taken by another individual.  Reviewed risk for physical dependence, tolerance and addiction.    There is no evidence of aberrant behavior or any red flags.  .         The following portions of the patient's history were reviewed and updated as appropriate: allergies, current medications, past family history, past medical history, past social history, past surgical history and problem list.    Review of Systems   Constitutional: Negative for activity change, appetite change and unexpected weight change.   HENT: Negative for nosebleeds and trouble swallowing.    Eyes: Positive for visual disturbance. Negative for pain.   Respiratory: Negative for chest tightness, shortness of breath and wheezing.    Cardiovascular: Negative for chest pain and palpitations.   Gastrointestinal: Negative for abdominal pain and blood in stool.   Endocrine: Negative.    Genitourinary: Negative for difficulty urinating and hematuria.   Musculoskeletal: Negative for joint swelling.   Skin: Negative for color change and rash.   Allergic/Immunologic: Negative.    Neurological: Negative for syncope and speech difficulty.   Hematological: Negative for adenopathy.   Psychiatric/Behavioral: Positive for decreased concentration. Negative for agitation and confusion.   All other systems reviewed and are negative.      Objective   Physical Exam  Vitals and nursing note reviewed.   Constitutional:       General: She is not in acute distress.     Appearance: Normal appearance. She is well-developed. She is not ill-appearing or toxic-appearing.   HENT:      Head: Normocephalic.      Right Ear: External ear normal.      Left Ear: External ear normal.      Nose: Nose normal.      Mouth/Throat:       Pharynx: Oropharynx is clear.   Eyes:      General: No scleral icterus.     Conjunctiva/sclera: Conjunctivae normal.      Pupils: Pupils are equal, round, and reactive to light.   Neck:      Thyroid: No thyromegaly.   Cardiovascular:      Rate and Rhythm: Normal rate and regular rhythm.      Heart sounds: Normal heart sounds. No murmur heard.      Pulmonary:      Effort: Pulmonary effort is normal. No respiratory distress.      Breath sounds: Normal breath sounds.   Musculoskeletal:         General: No deformity. Normal range of motion.      Cervical back: Normal range of motion and neck supple.   Skin:     General: Skin is warm and dry.      Findings: No rash.   Neurological:      General: No focal deficit present.      Mental Status: She is alert and oriented to person, place, and time. Mental status is at baseline.   Psychiatric:         Mood and Affect: Mood normal.         Behavior: Behavior normal.         Thought Content: Thought content normal.         Judgment: Judgment normal.         Assessment/Plan   Diagnoses and all orders for this visit:    1. Essential hypertension (Primary)    2. Attention deficit disorder (ADD) without hyperactivity    3. Gastroesophageal reflux disease without esophagitis    4. Migraine without aura and without status migrainosus, not intractable    5. Moderate persistent asthma without complication    6. Screen for colon cancer  -     Cologuard - Stool, Per Rectum; Future    7. Impaired visual perception    8. Vitamin D deficiency        Cologuard for colon cancer screen  Plan, Lissette Hawthorne, was seen today.  she was seen for HTN and continue medication, Imparied fasting glucose and plan follow up labs, diet, and exercise, GERD and will continue on PPI medication, Seasonal allergies and is doing well on their medication PRN, Asthma and is well controlled on medication.   and Vitamin D deficiency and supplemented.  Has nebulizer also  Cont Adderall for ADD works          Answers for HPI/ROS submitted by the patient on 11/12/2021  Please describe your symptoms.: ADD 3 month follow up  Have you had these symptoms before?: Yes  How long have you been having these symptoms?: Greater than 2 weeks  Please list any medications you are currently taking for this condition.: Adderall  What is the primary reason for your visit?: Other

## 2021-12-19 RX ORDER — DEXTROAMPHETAMINE SACCHARATE, AMPHETAMINE ASPARTATE, DEXTROAMPHETAMINE SULFATE AND AMPHETAMINE SULFATE 7.5; 7.5; 7.5; 7.5 MG/1; MG/1; MG/1; MG/1
30 TABLET ORAL 2 TIMES DAILY
Qty: 60 TABLET | Refills: 0 | Status: SHIPPED | OUTPATIENT
Start: 2021-12-19 | End: 2022-01-17 | Stop reason: SDUPTHER

## 2021-12-19 NOTE — TELEPHONE ENCOUNTER
----- Message from Lissette Hawthorne sent at 12/19/2021  6:19 AM EST -----  Regarding: Prescription refill   Good morning    Will you please send my Adderall prescription in I’ve got 2 days left.     Thank you

## 2022-01-17 RX ORDER — DEXTROAMPHETAMINE SACCHARATE, AMPHETAMINE ASPARTATE, DEXTROAMPHETAMINE SULFATE AND AMPHETAMINE SULFATE 7.5; 7.5; 7.5; 7.5 MG/1; MG/1; MG/1; MG/1
30 TABLET ORAL 2 TIMES DAILY
Qty: 60 TABLET | Refills: 0 | Status: SHIPPED | OUTPATIENT
Start: 2022-01-17 | End: 2022-02-16 | Stop reason: SDUPTHER

## 2022-01-22 RX ORDER — AMLODIPINE BESYLATE 5 MG/1
TABLET ORAL
Qty: 90 TABLET | Refills: 1 | Status: SHIPPED | OUTPATIENT
Start: 2022-01-22 | End: 2022-08-16

## 2022-01-27 ENCOUNTER — TELEMEDICINE (OUTPATIENT)
Dept: FAMILY MEDICINE CLINIC | Facility: CLINIC | Age: 47
End: 2022-01-27

## 2022-01-27 DIAGNOSIS — J06.9 ACUTE URI: ICD-10-CM

## 2022-01-27 DIAGNOSIS — U07.1 COVID: Primary | ICD-10-CM

## 2022-01-27 PROCEDURE — 99213 OFFICE O/P EST LOW 20 MIN: CPT | Performed by: PHYSICIAN ASSISTANT

## 2022-01-27 RX ORDER — BENZONATATE 200 MG/1
200 CAPSULE ORAL 3 TIMES DAILY PRN
Qty: 30 CAPSULE | Refills: 1 | Status: SHIPPED | OUTPATIENT
Start: 2022-01-27 | End: 2022-02-16 | Stop reason: SDDI

## 2022-01-27 RX ORDER — CHLORCYCLIZINE HYDROCHLORIDE AND PSEUDOEPHEDRINE HYDROCHLORIDE 25; 60 MG/1; MG/1
TABLET ORAL
Qty: 60 TABLET | Refills: 5 | Status: SHIPPED | OUTPATIENT
Start: 2022-01-27 | End: 2022-02-16 | Stop reason: SDDI

## 2022-01-27 RX ORDER — AMOXICILLIN AND CLAVULANATE POTASSIUM 875; 125 MG/1; MG/1
1 TABLET, FILM COATED ORAL EVERY 12 HOURS SCHEDULED
Qty: 20 TABLET | Refills: 3 | Status: SHIPPED | OUTPATIENT
Start: 2022-01-27 | End: 2022-03-29 | Stop reason: SDDI

## 2022-01-27 RX ORDER — METHYLPREDNISOLONE 4 MG/1
TABLET ORAL
Qty: 21 TABLET | Refills: 3 | Status: SHIPPED | OUTPATIENT
Start: 2022-01-27 | End: 2022-02-16 | Stop reason: SDDI

## 2022-01-27 NOTE — PROGRESS NOTES
Subjective   Lissette Hawthorne is a 46 y.o. female.   You have chosen to receive care through a telehealth visit.  Do you consent to use a video/audio connection for your medical care today? Yes  BMI Readings from Last 1 Encounters:   11/16/21 25.97 kg/m²       History of Present Illness   Lissette Hawthorne 46 y.o. female who presents for evaluation of sinus pressure and congestion, sore throat, fatigue. Symptoms include ear pressure, congestion, sore throat, post nasal drip, myalgias, headache, fatigue, runny nose, ear pain and nausea.  Onset of symptoms was 1 day ago, gradually worsening since that time. Patient denies shortness of breath, wheezing, fever, loss of taste or smell.   Evaluation to date: none Treatment to date:  Advil.   Achy, sore throat, nausea, runny stuffy; ear pain left, tired, h/a, body aches  Some cough; no fever  Onset symptoms 1-26-22  + test today at her work--rapid  Had all 3 COVID vaccines  Advil          Good morning Trang,  I tested positive for Covid this morning. One of my biggest problems is my ears are hurting and feel full. Would it be possible to get a prescription called in for a steroid to help with that? Are there any vitamins/supplements I should also start to help me?   Patient has history of moderate asthma and has albuterol per inhaler nebulizer but.  Has not had symptoms of asthma flare at this time.  The following portions of the patient's history were reviewed and updated as appropriate: allergies, current medications, past family history, past medical history, past social history, past surgical history and problem list.    Review of Systems   Constitutional: Positive for activity change, appetite change, fatigue and fever. Negative for unexpected weight change.   HENT: Positive for congestion, ear pain, postnasal drip, rhinorrhea, sinus pain and sore throat. Negative for nosebleeds and trouble swallowing.    Eyes: Negative for pain and visual disturbance.   Respiratory:  Positive for cough. Negative for chest tightness, shortness of breath and wheezing.    Cardiovascular: Negative for chest pain and palpitations.   Gastrointestinal: Positive for nausea. Negative for abdominal pain, blood in stool and diarrhea.   Endocrine: Negative.    Genitourinary: Negative for difficulty urinating and hematuria.   Musculoskeletal: Negative for joint swelling.   Skin: Negative for color change and rash.   Allergic/Immunologic: Negative.    Neurological: Positive for headaches. Negative for syncope and speech difficulty.   Hematological: Negative for adenopathy.   Psychiatric/Behavioral: Negative for agitation and confusion.   All other systems reviewed and are negative.      Objective   Physical Exam  Constitutional:       General: She is not in acute distress.     Appearance: She is well-developed. She is ill-appearing. She is not diaphoretic.   HENT:      Head: Normocephalic and atraumatic.   Eyes:      Conjunctiva/sclera: Conjunctivae normal.   Pulmonary:      Effort: Pulmonary effort is normal. No respiratory distress.   Musculoskeletal:         General: Normal range of motion.      Cervical back: Normal range of motion.   Skin:     General: Skin is dry.   Neurological:      Mental Status: She is alert and oriented to person, place, and time.      Coordination: Coordination normal.   Psychiatric:         Behavior: Behavior normal.         Thought Content: Thought content normal.         Judgment: Judgment normal.         Assessment/Plan   Diagnoses and all orders for this visit:    1. COVID (Primary)    2. Acute URI    Other orders  -     methylPREDNISolone (MEDROL) 4 MG dose pack; follow package directions  Dispense: 21 tablet; Refill: 3  -     amoxicillin-clavulanate (Augmentin) 875-125 MG per tablet; Take 1 tablet by mouth Every 12 (Twelve) Hours. One PO BID for infection with food  Dispense: 20 tablet; Refill: 3  -     Chlorcyclizine-Pseudoephed (Stahist AD) 25-60 MG tablet; 1 PO Q 8 hours  PRN congestion  Dispense: 60 tablet; Refill: 5  -     benzonatate (TESSALON) 200 MG capsule; Take 1 capsule by mouth 3 (Three) Times a Day As Needed for Cough.  Dispense: 30 capsule; Refill: 1        Sent Stahist for congestion take as needed and watch drowsiness and palpitation  Concern about her ear pressure being so significant and history of asthma along with the upper respiratory congestion sent Medrol Dosepak  She does have her albuterol by nebulizer and MDI if asthma flare  Also sent Augmentin if she develops sinus infection

## 2022-02-16 ENCOUNTER — TELEMEDICINE (OUTPATIENT)
Dept: FAMILY MEDICINE CLINIC | Facility: CLINIC | Age: 47
End: 2022-02-16

## 2022-02-16 DIAGNOSIS — F98.8 ATTENTION DEFICIT DISORDER (ADD) WITHOUT HYPERACTIVITY: Primary | ICD-10-CM

## 2022-02-16 PROCEDURE — 99213 OFFICE O/P EST LOW 20 MIN: CPT | Performed by: PHYSICIAN ASSISTANT

## 2022-02-16 RX ORDER — DEXTROAMPHETAMINE SACCHARATE, AMPHETAMINE ASPARTATE, DEXTROAMPHETAMINE SULFATE AND AMPHETAMINE SULFATE 7.5; 7.5; 7.5; 7.5 MG/1; MG/1; MG/1; MG/1
30 TABLET ORAL 2 TIMES DAILY
Qty: 60 TABLET | Refills: 0 | Status: SHIPPED | OUTPATIENT
Start: 2022-02-16 | End: 2022-03-23 | Stop reason: SDUPTHER

## 2022-02-16 NOTE — PROGRESS NOTES
Subjective   Lissette Hawthorne is a 46 y.o. female.   You have chosen to receive care through a telehealth visit.  Do you consent to use a video/audio connection for your medical care today? Yes  BMI Readings from Last 1 Encounters:   11/16/21 25.97 kg/m²       History of Present Illness   Has seen Dr Cristino miller----asthma controlled on meds        [facility] neuropsych eval 7-1-15     [facility]as has her on Detrol for bladder urgency  Lissette Hawthorne 46 y.o. female who presents for ADD/ADHD follow up. They are well controlled on their current Rx.  No new problems with insomnia, tachycardia, chest pain, or weight loss----he has been aggressively working on diet and exercise to get her weight down but she does report that her weight stable and about the same as when I saw her in person in November around 142 pounds.  Is found that her mom has breast cancer and notes she is feeling stressed today. The patient has read and signed the [facility] Controlled Substance Contract.  I will continue to see patient for regular follow up appointments and give the lowest effective dose.  They are well controlled on their medication at the lowest effective dose.  She has a previous history of a diagnosis of  ADD/ADHD.   ASIF has been reviewed by me and is updated every 3 months. The patient is aware of the potential for addiction and dependence.  The Rx continues to help them concentrate, finish tasks in timely manor, and succeed.  She denies current suicidal and homicidal ideation.  Reviewed causes for potential of discontinuation of stimulant medication,  including but not limited to consideration for diversion, obtaining other controlled substances from other license practitioners, or  inappropriate office behavior.  Patient understands to use a controlled substance as prescribed by physician and to avoid improper use of controlled substances.  Patient will also verbalized understanding that prescriptions to be  filled at the same pharmacy  unless office staff is made aware of this.  Patient understands they can be submitted to random urine drug screens and/or random pill counts on any request.  Patient understands they are not to receive early refills.  The patient must produce an official police report for any effort to replace controlled substances that are lost or stolen.  No use of illegal street drugs while receiving controlled substances from this prescribing provider.  The patient is to take medication as prescribed  and not deviate from the normal schedule without consultation from the provider.  Patient is not to share the medication with others or to take medication with alcohol or other sedatives.  Use caution when driving or operating machinery.  Must always keep the prescription in the original container.  Patient is to store controlled substances in a locked cabinet or other secure storage unit that is cool, dry and out of sunlight.  Patient must immediately notify office if this controlled substances is  improperly taken by another individual.  Reviewed risk for physical dependence, tolerance and addiction.    There is no evidence of aberrant behavior or any red flags.  .     Zoloft made her gain weight  Consider Lexapro          At her 11/16/2021 visit I noted her essential hypertension was controlled well with medication.  In continue.  Also GERD spotting well to PPI therapy and continue.  Also asthma is controlled with medication and not using rescue inhaler more than twice a week.  Failed H2 blocker.  Today's visit is about refilling her ADD medication but still wanting to note that I am responsible for these other problems  The following portions of the patient's history were reviewed and updated as appropriate: allergies, current medications, past family history, past medical history, past social history, past surgical history and problem list.    Review of Systems   Constitutional: Negative for fever.    HENT: Negative for nosebleeds and trouble swallowing.    Eyes: Negative for visual disturbance.   Respiratory: Negative for choking and stridor.    Cardiovascular: Negative for chest pain.   Gastrointestinal: Negative for blood in stool.   Endocrine: Negative for polydipsia.   Genitourinary: Negative for genital sores and hematuria.   Musculoskeletal: Negative for joint swelling.   Skin: Negative for color change and rash.   Allergic/Immunologic: Negative for immunocompromised state.   Neurological: Negative for seizures, facial asymmetry and speech difficulty.   Hematological: Negative for adenopathy.   Psychiatric/Behavioral: Positive for decreased concentration. Negative for behavioral problems, self-injury and suicidal ideas.       Objective   Physical Exam  Constitutional:       General: She is not in acute distress.     Appearance: She is well-developed. She is not diaphoretic.   HENT:      Head: Normocephalic and atraumatic.   Eyes:      Conjunctiva/sclera: Conjunctivae normal.   Pulmonary:      Effort: Pulmonary effort is normal. No respiratory distress.   Musculoskeletal:         General: Normal range of motion.      Cervical back: Normal range of motion.   Skin:     General: Skin is dry.   Neurological:      Mental Status: She is alert and oriented to person, place, and time.      Coordination: Coordination normal.   Psychiatric:         Behavior: Behavior normal.         Thought Content: Thought content normal.         Judgment: Judgment normal.         Assessment/Plan   Diagnoses and all orders for this visit:    1. Attention deficit disorder (ADD) without hyperactivity (Primary)        Refill ADD Rx-----working well  Concern with stress --mom now has breast cancer  Do note patient has stable hypertension continue medication working well, GERD controlled with PPI failing H2 blocker and continue  Asthma controlled and managed by pulmonary       Answers for HPI/ROS submitted by the patient on  2/16/2022  Please describe your symptoms.: ADD 3 month check up  Have you had these symptoms before?: Yes  How long have you been having these symptoms?: Greater than 2 weeks  Please list any medications you are currently taking for this condition.: Aderrall  What is the primary reason for your visit?: Other

## 2022-02-16 NOTE — PATIENT INSTRUCTIONS
Attention Deficit Hyperactivity Disorder, Adult  Attention deficit hyperactivity disorder (ADHD) is a mental health disorder that starts during childhood (neurodevelopmental disorder). For many people with ADHD, the disorder continues into the adult years. Treatment can help you manage your symptoms.  What are the causes?  The exact cause of ADHD is not known. Most experts believe genetics and environmental factors contribute to ADHD.  What increases the risk?  The following factors may make you more likely to develop this condition:  · Having a family history of ADHD.  · Being male.  · Being born to a mother who smoked or drank alcohol during pregnancy.  · Being exposed to lead or other toxins in the womb or early in life.  · Being born before 37 weeks of pregnancy (prematurely) or at a low birth weight.  · Having experienced a brain injury.  What are the signs or symptoms?  Symptoms of this condition depend on the type of ADHD. The two main types are inattentive and hyperactive-impulsive. Some people may have symptoms of both types.  Symptoms of the inattentive type include:  · Difficulty paying attention.  · Making careless mistakes.  · Not following instructions.  · Being disorganized.  · Avoiding tasks that require time and attention.  · Losing and forgetting things.  · Being easily distracted.  Symptoms of the hyperactive-impulsive type include:  · Restlessness.  · Talking too much.  · Interrupting.  · Difficulty with:  ? Sitting still.  ? Feeling motivated.  ? Relaxing.  ? Waiting in line or waiting for a turn.  In adults, this condition may lead to certain problems, such as:  · Keeping jobs.  · Performing tasks at work.  · Having stable relationships.  · Being on time or keeping to a schedule.  How is this diagnosed?  This condition is diagnosed based on your current symptoms and your history of symptoms. The diagnosis can be made by a health care provider such as a primary care provider or a mental health  care specialist.  Your health care provider may use a symptom checklist or a behavior rating scale to evaluate your symptoms. He or she may also want to talk with people who have observed your behaviors throughout your life.  How is this treated?  This condition can be treated with medicines and behavior therapy. Medicines may be the best option to reduce impulsive behaviors and improve attention. Your health care provider may recommend:  · Stimulant medicines. These are the most common medicines used for adult ADHD. They affect certain chemicals in the brain (neurotransmitters) and improve your ability to control your symptoms.  · A non-stimulant medicine for adult ADHD (atomoxetine). This medicine increases a neurotransmitter called norepinephrine. It may take weeks to months to see effects from this medicine.  Counseling and behavioral management are also important for treating ADHD. Counseling is often used along with medicine. Your health care provider may suggest:  · Cognitive behavioral therapy (CBT). This type of therapy teaches you to replace negative thoughts and actions with positive thoughts and actions. When used as part of ADHD treatment, this therapy may also include:  ? Coping strategies for organization, time management, impulse control, and stress reduction.  ? Mindfulness and meditation training.  · Behavioral management. You may work with a  who is specially trained to help people with ADHD manage and organize activities and function more effectively.  Follow these instructions at home:  Medicines    · Take over-the-counter and prescription medicines only as told by your health care provider.  · Talk with your health care provider about the possible side effects of your medicines and how to manage them.    Lifestyle    · Do not use drugs.  · Do not drink alcohol if:  ? Your health care provider tells you not to drink.  ? You are pregnant, may be pregnant, or are planning to become  pregnant.  · If you drink alcohol:  ? Limit how much you use to:  § 0-1 drink a day for women.  § 0-2 drinks a day for men.  ? Be aware of how much alcohol is in your drink. In the [place] one drink equals one 12 oz bottle of beer (355 mL), one 5 oz glass of wine (148 mL), or one 1½ oz glass of hard liquor (44 mL).  · Get enough sleep.  · Eat a healthy diet.  · Exercise regularly. Exercise can help to reduce stress and anxiety.    General instructions  · Learn as much as you can about adult ADHD, and work closely with your health care providers to find the treatments that work best for you.  · Follow the same schedule each day.  · Use reminder devices like notes, calendars, and phone apps to stay on time and organized.  · Keep all follow-up visits as told by your health care provider and therapist. This is important.  Where to find more information  A health care provider may be able to recommend resources that are available online or over the phone. You could start with:  · Attention Deficit Disorder Association (ADDA): www.add.org  · National [place] of Mental Health (NIM): www.nimh.nih.gov  Contact a health care provider if:  · Your symptoms continue to cause problems.  · You have side effects from your medicine, such as:  ? Repeated muscle twitches, coughing, or speech outbursts.  ? Sleep problems.  ? Loss of appetite.  ? Dizziness.  ? Unusually fast heartbeat.  ? Stomach pains.  ? Headaches.  · You are struggling with anxiety, depression, or substance abuse.  Get help right away if you:  · Have a severe reaction to a medicine.  If you ever feel like you may hurt yourself or others, or have thoughts about taking your own life, get help right away. You can go to the nearest emergency department or call:  · Your local emergency services (911 in the [place]  · A suicide crisis helpline, such as the National Suicide Prevention Lifeline at 1-792.734.2820. This is open 24 hours a day.  Summary  · ADHD is a mental  health disorder that starts during childhood (neurodevelopmental disorder) and often continues into the adult years.  · The exact cause of ADHD is not known. Most experts believe genetics and environmental factors contribute to ADHD.  · There is no cure for ADHD, but treatment with medicine, cognitive behavioral therapy, or behavioral management can help you manage your condition.  This information is not intended to replace advice given to you by your health care provider. Make sure you discuss any questions you have with your health care provider.  Document Revised: 05/11/2020 Document Reviewed: 05/11/2020  [employer] Patient Education © 2021 [employer]

## 2022-02-21 ENCOUNTER — APPOINTMENT (OUTPATIENT)
Dept: WOMENS IMAGING | Facility: HOSPITAL | Age: 47
End: 2022-02-21

## 2022-02-21 PROCEDURE — 77067 SCR MAMMO BI INCL CAD: CPT | Performed by: RADIOLOGY

## 2022-02-21 PROCEDURE — 77063 BREAST TOMOSYNTHESIS BI: CPT | Performed by: RADIOLOGY

## 2022-02-22 ENCOUNTER — PRIOR AUTHORIZATION (OUTPATIENT)
Dept: FAMILY MEDICINE CLINIC | Facility: CLINIC | Age: 47
End: 2022-02-22

## 2022-02-22 NOTE — TELEPHONE ENCOUNTER
PA started for gen Adderall: faxed in to [employer] form after several attempts/failed to verify elgibiltiy online through C[facility]. I called [employer] and was transferred to the PA department in which it hung up after 20 mins.     Aliyah

## 2022-02-22 NOTE — TELEPHONE ENCOUNTER
* for next year.  I googled [employer]mark form and clicked on Adderall, printed, filled out and faxed. Got a determination in an hour.   Can't process on [facility] because of the way patients name is spelled with her insurance company.    APPROVED  2/22/22-2/22/23    Aliyah

## 2022-03-23 RX ORDER — DEXTROAMPHETAMINE SACCHARATE, AMPHETAMINE ASPARTATE, DEXTROAMPHETAMINE SULFATE AND AMPHETAMINE SULFATE 7.5; 7.5; 7.5; 7.5 MG/1; MG/1; MG/1; MG/1
30 TABLET ORAL 2 TIMES DAILY
Qty: 60 TABLET | Refills: 0 | Status: SHIPPED | OUTPATIENT
Start: 2022-03-23 | End: 2022-04-23 | Stop reason: SDUPTHER

## 2022-03-23 NOTE — TELEPHONE ENCOUNTER
----- Message from Lissette Hawthorne sent at 3/23/2022  6:35 AM EDT -----  Regarding: Adderall Prescription   Good morning,    I have 2 days of medicine left if you can send my prescription over to [facility].     Thank you,  Arina Ibanez

## 2022-03-29 ENCOUNTER — OFFICE VISIT (OUTPATIENT)
Dept: FAMILY MEDICINE CLINIC | Facility: CLINIC | Age: 47
End: 2022-03-29

## 2022-03-29 VITALS
DIASTOLIC BLOOD PRESSURE: 79 MMHG | HEART RATE: 95 BPM | SYSTOLIC BLOOD PRESSURE: 122 MMHG | TEMPERATURE: 97.5 F | HEIGHT: 62 IN | BODY MASS INDEX: 27.23 KG/M2 | WEIGHT: 148 LBS | RESPIRATION RATE: 16 BRPM | OXYGEN SATURATION: 99 %

## 2022-03-29 DIAGNOSIS — F33.1 MODERATE EPISODE OF RECURRENT MAJOR DEPRESSIVE DISORDER: Primary | ICD-10-CM

## 2022-03-29 PROCEDURE — 99213 OFFICE O/P EST LOW 20 MIN: CPT | Performed by: PHYSICIAN ASSISTANT

## 2022-03-29 RX ORDER — BUPROPION HYDROCHLORIDE 150 MG/1
150 TABLET ORAL EVERY MORNING
Qty: 30 TABLET | Refills: 2 | Status: SHIPPED | OUTPATIENT
Start: 2022-03-29 | End: 2022-04-19

## 2022-03-29 NOTE — PATIENT INSTRUCTIONS
Major Depressive Disorder, Adult  Major depressive disorder (MDD) is a mental health condition. It may also be called clinical depression or unipolar depression. MDD causes symptoms of sadness, hopelessness, and loss of interest in things. These symptoms last most of the day, almost every day, for 2 weeks. MDD can also cause physical symptoms. It can interfere with relationships and with everyday activities, such as work, school, and activities that are usually pleasant.  MDD may be mild, moderate, or severe. It may be single-episode MDD, which happens once, or recurrent MDD, which may occur multiple times.  What are the causes?  The exact cause of this condition is not known. MDD is most likely caused by a combination of things, which may include:  Your personality traits.  [place] or conditioned behaviors or thoughts or feelings that reinforce negativity.  Any alcohol or substance misuse.  Long-term (chronic) physical or mental health illness.  Going through a traumatic experience or major life changes.  What increases the risk?  The following factors may make someone more likely to develop MDD:  A family history of depression.  Being a woman.  Troubled family relationships.  Abnormally low levels of certain brain chemicals.  Traumatic or painful events in childhood, especially abuse or loss of a parent.  A lot of stress from life experiences, such as poor living conditions or discrimination.  Chronic physical illness or other mental health disorders.  What are the signs or symptoms?  The main symptoms of MDD usually include:  Constant depressed or irritable mood.  A loss of interest in things and activities.  Other symptoms include:  Sleeping or eating too much or too little.  Unexplained weight gain or weight loss.  Tiredness or low energy.  Being agitated, restless, or weak.  Feeling hopeless, worthless, or guilty.  Trouble thinking clearly or making decisions.  Thoughts of suicide or thoughts of harming  others.  Isolating oneself or avoiding other people or activities.  Trouble completing tasks, work, or any normal obligations.  Severe symptoms of this condition may include:  Psychotic depression.This may include false beliefs, or delusions. It may also include seeing, hearing, tasting, smelling, or feeling things that are not real (hallucinations).  Chronic depression or persistent depressive disorder. This is low-level depression that lasts for at least 2 years.  Melancholic depression, or feeling extremely sad and hopeless.  Catatonic depression, which includes trouble speaking and trouble moving.  How is this diagnosed?  This condition may be diagnosed based on:  Your symptoms.  Your medical and mental health history. You may be asked questions about your lifestyle, including any drug and alcohol use.  A physical exam.  Blood tests to rule out other conditions.  MDD is confirmed if you have the following symptoms most of the day, nearly every day, in a 2-week period:  Either a depressed mood or loss of interest.  At least four other MDD symptoms.  How is this treated?  This condition is usually treated by mental health professionals, such as psychologists, psychiatrists, and clinical social workers. You may need more than one type of treatment. Treatment may include:  Psychotherapy, also called talk therapy or counseling. Types of psychotherapy include:  Cognitive behavioral therapy (CBT). This teaches you to recognize unhealthy feelings, thoughts, and behaviors, and replace them with positive thoughts and actions.  Interpersonal therapy (IPT). This helps you to improve the way you communicate with others or relate to them.  Family therapy. This treatment includes members of your family.  Medicines to treat anxiety and depression. These medicines help to balance the brain chemicals that affect your emotions.  Lifestyle changes. You may be asked to:  Limit alcohol use and avoid drug use.  Get regular  exercise.  Get plenty of sleep.  Make healthy eating choices.  Spend more time outdoors.  Brain stimulation. This may be done if symptoms are very severe and other treatments have not worked. Examples of this treatment are electroconvulsive therapy and transcranial magnetic stimulation.  Follow these instructions at home:  Activity  Exercise regularly and spend time outdoors.  Find activities that you enjoy doing, and make time to do them.  Find healthy ways to manage stress, such as:  Meditation or deep breathing.  Spending time in nature.  Journaling.  Return to your normal activities as told by your health care provider. Ask your health care provider what activities are safe for you.  Alcohol and drug use  If you drink alcohol:  Limit how much you use to:  0-1 drink a day for women who are not pregnant.  0-2 drinks a day for men.  Be aware of how much alcohol is in your drink. In the [place] one drink equals one 12 oz bottle of beer (355 mL), one 5 oz glass of wine (148 mL), or one 1½ oz glass of hard liquor (44 mL).  Discuss your alcohol use with your health care provider. Alcohol can affect any antidepressant medicines you are taking.  Discuss any drug use with your health care provider.  General instructions    Take over-the-counter and prescription medicines only as told by your health care provider.  Eat a healthy diet and get plenty of sleep.  Consider joining a support group. Your health care provider may be able to recommend one.  Keep all follow-up visits as told by your health care provider. This is important.    Where to find more information  National [place] on Mental Illness: www.chani.org  [place] of Mental Health: www.nimh.nih.gov  Contact a health care provider if:  Your symptoms get worse.  You develop new symptoms.  Get help right away if:  You self-harm.  You have serious thoughts about hurting yourself or others.  You hallucinate.  If you ever feel like you may hurt yourself or  others, or have thoughts about taking your own life, get help right away. Go to your nearest emergency department or:  Call your local emergency services (971 in the [place]  Call a suicide crisis helpline, such as the National Suicide Prevention Lifeline at 1-447.613.4160. This is open 24 hours a day in the [place]  Text the Crisis Text Line at 694157 (in the [place]  Summary  Major depressive disorder (MDD) is a mental health condition. MDD causes symptoms of sadness, hopelessness, and loss of interest in things. These symptoms last most of the day, almost every day, for 2 weeks.  The symptoms of MDD can interfere with relationships and with everyday activities.  Treatments and support are available for people who develop MDD. You may need more than one type of treatment.  Get help right away if you have serious thoughts about hurting yourself or others.  This information is not intended to replace advice given to you by your health care provider. Make sure you discuss any questions you have with your health care provider.  Document Revised: 11/28/2020 Document Reviewed: 11/28/2020  Elsevier Patient Education © 2021 [employer]

## 2022-03-29 NOTE — PROGRESS NOTES
"Subjective   Lissette Hawthorne is a 46 y.o. female.     History of Present Illness     Lissette Hawthorne female 46 y.o., /79 (BP Location: Left arm, Patient Position: Sitting, Cuff Size: Adult)   Pulse 95   Temp 97.5 °F (36.4 °C)   Resp 16   Ht 157.5 cm (62.01\")   Wt 67.1 kg (148 lb)   LMP  (LMP Unknown)   SpO2 99%   BMI 27.06 kg/m²   who presents today for new evaluation and treatment of Depression.  She reports depressed mood, crying spells, difficulty falling asleep, fatigue, impaired concentration, excessive worry, unable to relax, irritable, sleep disturbance and overeating. Onset of symptoms was approximately several months ago---has had in past. She denies current suicidal and homicidal ideation. Risk factors are family history of anxiety and or depression, lifestyle of multiple roles and mom has breast cancer. Dad recently passed. Many life stresses going on.  Previous treatment includes Zoloft. She complains of the following medication side effects:weight gain. The patient declines to go to counseling..  PHQ 9 is 18.   Last labs August and goals met  Remains on Adderall working well for ADD.  Zoloft made her gain weight--last Rx 2016  Consider Lexapr  Has seen Dr Cristino miller----asthma controlled on meds  Noted at her 11/16/2021 visit essential hypertension was controlled on medication  The following portions of the patient's history were reviewed and updated as appropriate: allergies, current medications, past family history, past medical history, past social history, past surgical history and problem list.    Review of Systems   Constitutional: Positive for fatigue. Negative for activity change, appetite change and unexpected weight change.   HENT: Negative for nosebleeds and trouble swallowing.    Eyes: Negative for pain and visual disturbance.   Respiratory: Negative for chest tightness, shortness of breath and wheezing.    Cardiovascular: Negative for chest pain and palpitations. "   Gastrointestinal: Negative for abdominal pain and blood in stool.   Endocrine: Negative.    Genitourinary: Negative for difficulty urinating and hematuria.   Musculoskeletal: Negative for joint swelling.   Skin: Negative for color change and rash.   Allergic/Immunologic: Negative.    Neurological: Negative for syncope and speech difficulty.   Hematological: Negative for adenopathy.   Psychiatric/Behavioral: Positive for decreased concentration, dysphoric mood and sleep disturbance. Negative for agitation and confusion. The patient is not nervous/anxious.    All other systems reviewed and are negative.      Objective   Physical Exam  Vitals and nursing note reviewed.   Constitutional:       General: She is not in acute distress.     Appearance: She is well-developed. She is not ill-appearing or toxic-appearing.   HENT:      Head: Normocephalic.      Right Ear: External ear normal.      Left Ear: External ear normal.      Nose: Nose normal.      Mouth/Throat:      Pharynx: Oropharynx is clear.   Eyes:      General: No scleral icterus.     Conjunctiva/sclera: Conjunctivae normal.      Pupils: Pupils are equal, round, and reactive to light.   Neck:      Thyroid: No thyromegaly.      Vascular: No carotid bruit.   Cardiovascular:      Rate and Rhythm: Normal rate and regular rhythm.      Heart sounds: Normal heart sounds. No murmur heard.  Pulmonary:      Effort: Pulmonary effort is normal. No respiratory distress.      Breath sounds: Normal breath sounds. No rales.   Musculoskeletal:         General: No deformity. Normal range of motion.      Cervical back: Normal range of motion and neck supple.   Skin:     General: Skin is warm and dry.      Findings: No rash.   Neurological:      General: No focal deficit present.      Mental Status: She is alert and oriented to person, place, and time. Mental status is at baseline.   Psychiatric:         Mood and Affect: Mood normal.         Behavior: Behavior normal.          Thought Content: Thought content normal.         Judgment: Judgment normal.         Assessment/Plan   Diagnoses and all orders for this visit:    1. Moderate episode of recurrent major depressive disorder (HCC) (Primary)    Other orders  -     buPROPion XL (Wellbutrin XL) 150 MG 24 hr tablet; Take 1 tablet by mouth Every Morning. For depression  Dispense: 30 tablet; Refill: 2    Start Wellbutrin for depression.  PHQ-9 score 18 today.  Follow-up 1 month and repeat PHQ-9  Also talked about if Wellbutrin makes her anxious plan to message me and change to SSRI like Lexapro  Continue to take current meds and note blood pressur stable on medication and ADD stable on medication            Single Mechanical/Accidental Fall

## 2022-04-19 ENCOUNTER — OFFICE VISIT (OUTPATIENT)
Dept: FAMILY MEDICINE CLINIC | Facility: CLINIC | Age: 47
End: 2022-04-19

## 2022-04-19 VITALS
HEIGHT: 62 IN | WEIGHT: 150.6 LBS | SYSTOLIC BLOOD PRESSURE: 121 MMHG | BODY MASS INDEX: 27.71 KG/M2 | TEMPERATURE: 97.5 F | RESPIRATION RATE: 16 BRPM | OXYGEN SATURATION: 100 % | HEART RATE: 96 BPM | DIASTOLIC BLOOD PRESSURE: 67 MMHG

## 2022-04-19 DIAGNOSIS — F33.0 MILD EPISODE OF RECURRENT MAJOR DEPRESSIVE DISORDER: Primary | ICD-10-CM

## 2022-04-19 DIAGNOSIS — F98.8 ATTENTION DEFICIT DISORDER (ADD) WITHOUT HYPERACTIVITY: ICD-10-CM

## 2022-04-19 DIAGNOSIS — I10 ESSENTIAL HYPERTENSION: ICD-10-CM

## 2022-04-19 PROCEDURE — 99213 OFFICE O/P EST LOW 20 MIN: CPT | Performed by: PHYSICIAN ASSISTANT

## 2022-04-19 RX ORDER — BUPROPION HYDROCHLORIDE 300 MG/1
300 TABLET ORAL DAILY
Qty: 90 TABLET | Refills: 1 | Status: SHIPPED | OUTPATIENT
Start: 2022-04-19 | End: 2022-09-23

## 2022-04-19 NOTE — PROGRESS NOTES
"Subjective   Lissette Hawthorne is a 47 y.o. female.     History of Present Illness   Lissette Hawthorne female 47 y.o., /67 (BP Location: Left arm, Patient Position: Sitting, Cuff Size: Adult)   Pulse 96   Temp 97.5 °F (36.4 °C)   Resp 16   Ht 157.5 cm (62.01\")   Wt 68.3 kg (150 lb 9.6 oz)   LMP  (LMP Unknown)   SpO2 100%   BMI 27.54 kg/m²   who presents today for follow up of Depression.  She reports overall improved depression and less sad. Somewhat able to enjoyed things.  Eating less and this is great. Overall improved--compare PHQ 9---was 18 and now 10.  No anxiety.  ADD still controlled. . Onset of symptoms was approximately several months ago. She denies current suicidal and homicidal ideation. Risk factors are family history of anxiety and or depression, lifestyle of multiple roles and grief.  Previous treatment includes current Rx. She complains of the following medication side effects:none. The patient declines to go to counseling..      My notes from 3-29-22  who presents today for new evaluation and treatment of Depression.  She reports depressed mood, crying spells, difficulty falling asleep, fatigue, impaired concentration, excessive worry, unable to relax, irritable, sleep disturbance and overeating. Onset of symptoms was approximately several months ago---has had in past. She denies current suicidal and homicidal ideation. Risk factors are family history of anxiety and or depression, lifestyle of multiple roles and mom has breast cancer. Dad recently passed. Many life stresses going on.  Previous treatment includes Zoloft. She complains of the following medication side effects:weight gain. The patient declines to go to counseling..  PHQ 9 is 18.   Last labs August and goals met  Remains on Adderall working well for ADD.  Zoloft made her gain weight--last Rx 2016  Consider Lexapro  Has seen Dr Cristino miller----asthma controlled on meds  Noted at her 11/16/2021 visit essential " hypertension was controlled on medication  Start Wellbutrin for depression.  PHQ-9 score 18 today.  Follow-up 1 month and repeat PHQ-9  Also talked about if Wellbutrin makes her anxious plan to message me and change to SSRI like Lexapro  Continue to take current meds and note blood pressur stable on medication and ADD stable on medication    PHQ 9 is now 10  The following portions of the patient's history were reviewed and updated as appropriate: allergies, current medications, past family history, past medical history, past social history, past surgical history and problem list.    Review of Systems   Constitutional: Negative for activity change, appetite change and unexpected weight change.   HENT: Negative for nosebleeds and trouble swallowing.    Eyes: Negative for pain and visual disturbance.   Respiratory: Negative for chest tightness, shortness of breath and wheezing.    Cardiovascular: Negative for chest pain and palpitations.   Gastrointestinal: Negative for abdominal pain and blood in stool.   Endocrine: Negative.    Genitourinary: Negative for difficulty urinating and hematuria.   Musculoskeletal: Negative for joint swelling.   Skin: Negative for color change and rash.   Allergic/Immunologic: Negative.    Neurological: Negative for syncope and speech difficulty.   Hematological: Negative for adenopathy.   Psychiatric/Behavioral: Positive for dysphoric mood and sleep disturbance. Negative for agitation and confusion. The patient is not nervous/anxious.    All other systems reviewed and are negative.      Objective   Physical Exam  Vitals and nursing note reviewed.   Constitutional:       General: She is not in acute distress.     Appearance: She is well-developed. She is not ill-appearing or toxic-appearing.   HENT:      Head: Normocephalic.      Right Ear: External ear normal.      Left Ear: External ear normal.      Nose: Nose normal.      Mouth/Throat:      Pharynx: Oropharynx is clear.   Eyes:       General: No scleral icterus.     Conjunctiva/sclera: Conjunctivae normal.      Pupils: Pupils are equal, round, and reactive to light.   Neck:      Thyroid: No thyromegaly.      Vascular: No carotid bruit.   Cardiovascular:      Rate and Rhythm: Normal rate and regular rhythm.      Heart sounds: Normal heart sounds. No murmur heard.  Pulmonary:      Effort: Pulmonary effort is normal. No respiratory distress.      Breath sounds: Normal breath sounds.   Musculoskeletal:         General: No deformity. Normal range of motion.      Cervical back: Normal range of motion and neck supple.   Skin:     General: Skin is warm and dry.      Findings: No rash.   Neurological:      General: No focal deficit present.      Mental Status: She is alert and oriented to person, place, and time. Mental status is at baseline.   Psychiatric:         Behavior: Behavior normal.         Thought Content: Thought content normal.         Judgment: Judgment normal.         Assessment/Plan   Diagnoses and all orders for this visit:    1. Mild episode of recurrent major depressive disorder (HCC) (Primary)    2. Attention deficit disorder (ADD) without hyperactivity  Comments:  doing well on Adderall    3. Essential hypertension  Comments:  stable on Rx        PHQ 9 was 18 and now 10--now mild depression    Can try 300mg Wellbutrin XL and will send to mail order.  Patient knows to message me on my chart if it causes anxiety  Continue Adderall as noted before working well for ADD  Continue amlodipine for hypertension well-controlled       Answers for HPI/ROS submitted by the patient on 4/19/2022  Please describe your symptoms.: Follow up for Wellbutrin  Have you had these symptoms before?: Yes  How long have you been having these symptoms?: Greater than 2 weeks  Please list any medications you are currently taking for this condition.: Wellbutrin  What is the primary reason for your visit?: Other

## 2022-04-23 RX ORDER — DEXTROAMPHETAMINE SACCHARATE, AMPHETAMINE ASPARTATE, DEXTROAMPHETAMINE SULFATE AND AMPHETAMINE SULFATE 7.5; 7.5; 7.5; 7.5 MG/1; MG/1; MG/1; MG/1
30 TABLET ORAL 2 TIMES DAILY
Qty: 60 TABLET | Refills: 0 | Status: SHIPPED | OUTPATIENT
Start: 2022-04-23 | End: 2022-05-17 | Stop reason: SDUPTHER

## 2022-04-23 NOTE — TELEPHONE ENCOUNTER
----- Message from Lissette Hawthorne sent at 4/23/2022  7:52 AM EDT -----  Regarding: Adderall  Good morning     Sorry I meant to send you a message yesterday I need my prescription for Adderall.

## 2022-04-25 ENCOUNTER — PATIENT MESSAGE (OUTPATIENT)
Dept: FAMILY MEDICINE CLINIC | Facility: CLINIC | Age: 47
End: 2022-04-25

## 2022-04-25 ENCOUNTER — TELEPHONE (OUTPATIENT)
Dept: FAMILY MEDICINE CLINIC | Facility: CLINIC | Age: 47
End: 2022-04-25

## 2022-04-25 NOTE — TELEPHONE ENCOUNTER
----- Message from Lissette Hawthorne sent at 4/25/2022  6:16 PM EDT -----  Regarding: [employer]  Yes they cover it but of course it needs an PA as well

## 2022-05-17 ENCOUNTER — OFFICE VISIT (OUTPATIENT)
Dept: FAMILY MEDICINE CLINIC | Facility: CLINIC | Age: 47
End: 2022-05-17

## 2022-05-17 VITALS
HEIGHT: 62 IN | RESPIRATION RATE: 16 BRPM | WEIGHT: 148.2 LBS | HEART RATE: 93 BPM | SYSTOLIC BLOOD PRESSURE: 122 MMHG | OXYGEN SATURATION: 99 % | BODY MASS INDEX: 27.27 KG/M2 | DIASTOLIC BLOOD PRESSURE: 79 MMHG | TEMPERATURE: 97.5 F

## 2022-05-17 DIAGNOSIS — F98.8 ATTENTION DEFICIT DISORDER (ADD) WITHOUT HYPERACTIVITY: ICD-10-CM

## 2022-05-17 DIAGNOSIS — I10 ESSENTIAL HYPERTENSION: Primary | ICD-10-CM

## 2022-05-17 DIAGNOSIS — H53.9 IMPAIRED VISUAL PERCEPTION: ICD-10-CM

## 2022-05-17 DIAGNOSIS — F33.42 RECURRENT MAJOR DEPRESSIVE DISORDER, IN FULL REMISSION: ICD-10-CM

## 2022-05-17 DIAGNOSIS — F41.1 GENERALIZED ANXIETY DISORDER: ICD-10-CM

## 2022-05-17 DIAGNOSIS — K21.9 GASTROESOPHAGEAL REFLUX DISEASE WITHOUT ESOPHAGITIS: ICD-10-CM

## 2022-05-17 DIAGNOSIS — J45.40 MODERATE PERSISTENT ASTHMA WITHOUT COMPLICATION: ICD-10-CM

## 2022-05-17 DIAGNOSIS — E55.9 VITAMIN D DEFICIENCY: ICD-10-CM

## 2022-05-17 PROCEDURE — 99214 OFFICE O/P EST MOD 30 MIN: CPT | Performed by: PHYSICIAN ASSISTANT

## 2022-05-17 RX ORDER — ESOMEPRAZOLE MAGNESIUM 40 MG/1
40 CAPSULE, DELAYED RELEASE ORAL
Qty: 90 CAPSULE | Refills: 3 | Status: SHIPPED | OUTPATIENT
Start: 2022-05-17

## 2022-05-17 RX ORDER — DEXTROAMPHETAMINE SACCHARATE, AMPHETAMINE ASPARTATE, DEXTROAMPHETAMINE SULFATE AND AMPHETAMINE SULFATE 7.5; 7.5; 7.5; 7.5 MG/1; MG/1; MG/1; MG/1
30 TABLET ORAL 2 TIMES DAILY
Qty: 60 TABLET | Refills: 0 | Status: SHIPPED | OUTPATIENT
Start: 2022-05-17 | End: 2022-06-22 | Stop reason: SDUPTHER

## 2022-05-17 NOTE — PROGRESS NOTES
"Danielle Hawthorne is a 47 y.o. female.     History of Present Illness    Since the last visit, she has overall felt fairly well.  She has Primary Hypertension and well controlled on current medication, Impaired fasting glucose and will monitor labs to watch for DMII, GERD controlled on PPI Rx, Asthma and remains under care of their specialist and Vitamin D deficiency and will update labs for continued management.  she has been compliant with current medications have reviewed them.  The patient denies medication side effects.  Will refill medications. /79 (BP Location: Left arm, Patient Position: Sitting, Cuff Size: Adult)   Pulse 93   Temp 97.5 °F (36.4 °C)   Resp 16   Ht 157.5 cm (62.01\")   Wt 67.2 kg (148 lb 3.2 oz)   LMP  (LMP Unknown)   SpO2 99%   BMI 27.10 kg/m²     Results for orders placed or performed in visit on 06/11/21   Comprehensive metabolic panel    Specimen: Blood   Result Value Ref Range    Glucose 72 65 - 99 mg/dL    BUN 20 6 - 20 mg/dL    Creatinine 0.75 0.57 - 1.00 mg/dL    eGFR Non African Am 83 >60 mL/min/1.73    eGFR African Am 100 >60 mL/min/1.73    BUN/Creatinine Ratio 26.7 (H) 7.0 - 25.0    Sodium 138 136 - 145 mmol/L    Potassium 4.3 3.5 - 5.2 mmol/L    Chloride 102 98 - 107 mmol/L    Total CO2 29.0 22.0 - 29.0 mmol/L    Calcium 9.5 8.6 - 10.5 mg/dL    Total Protein 7.1 6.0 - 8.5 g/dL    Albumin 4.40 3.50 - 5.20 g/dL    Globulin 2.7 gm/dL    A/G Ratio 1.6 g/dL    Total Bilirubin 0.4 0.0 - 1.2 mg/dL    Alkaline Phosphatase 58 39 - 117 U/L    AST (SGOT) 18 1 - 32 U/L    ALT (SGPT) 20 1 - 33 U/L   Lipid panel    Specimen: Blood   Result Value Ref Range    Total Cholesterol 182 0 - 200 mg/dL    Triglycerides 33 0 - 150 mg/dL    HDL Cholesterol 88 (H) 40 - 60 mg/dL    VLDL Cholesterol Jose R 7 5 - 40 mg/dL    LDL Chol Calc (NIH) 87 0 - 100 mg/dL   TSH    Specimen: Blood   Result Value Ref Range    TSH 1.710 0.270 - 4.200 uIU/mL   T4, Free    Specimen: Blood   Result " "Value Ref Range    Free T4 1.11 0.93 - 1.70 ng/dL   T3, Free    Specimen: Blood   Result Value Ref Range    T3, Free 2.4 2.0 - 4.4 pg/mL   Vitamin B12    Specimen: Blood   Result Value Ref Range    Vitamin B-12 414 211 - 946 pg/mL   Folate    Specimen: Blood   Result Value Ref Range    Folate 7.89 4.78 - 24.20 ng/mL   Vitamin D 25 Hydroxy    Specimen: Blood   Result Value Ref Range    25 Hydroxy, Vitamin D 63.3 30.0 - 100.0 ng/ml   Hemoglobin A1c    Specimen: Blood   Result Value Ref Range    Hemoglobin A1C 4.90 4.80 - 5.60 %   FSH & LH    Specimen: Blood   Result Value Ref Range    LH 7.8 mIU/mL    FSH 13.0 mIU/mL   CBC and Differential    Specimen: Blood   Result Value Ref Range    WBC 5.04 3.40 - 10.80 10*3/mm3    RBC 4.26 3.77 - 5.28 10*6/mm3    Hemoglobin 13.3 12.0 - 15.9 g/dL    Hematocrit 40.9 34.0 - 46.6 %    MCV 96.0 79.0 - 97.0 fL    MCH 31.2 26.6 - 33.0 pg    MCHC 32.5 31.5 - 35.7 g/dL    RDW 12.4 12.3 - 15.4 %    Platelets 336 140 - 450 10*3/mm3    Neutrophil Rel % 64.7 42.7 - 76.0 %    Lymphocyte Rel % 23.2 19.6 - 45.3 %    Monocyte Rel % 7.5 5.0 - 12.0 %    Eosinophil Rel % 3.4 0.3 - 6.2 %    Basophil Rel % 1.0 0.0 - 1.5 %    Neutrophils Absolute 3.26 1.70 - 7.00 10*3/mm3    Lymphocytes Absolute 1.17 0.70 - 3.10 10*3/mm3    Monocytes Absolute 0.38 0.10 - 0.90 10*3/mm3    Eosinophils Absolute 0.17 0.00 - 0.40 10*3/mm3    Basophils Absolute 0.05 0.00 - 0.20 10*3/mm3    Immature Granulocyte Rel % 0.2 0.0 - 0.5 %    Immature Grans Absolute 0.01 0.00 - 0.05 10*3/mm3    nRBC 0.0 0.0 - 0.2 /100 WBC           Lissetetfabienne Hawthorne female 47 y.o., /79 (BP Location: Left arm, Patient Position: Sitting, Cuff Size: Adult)   Pulse 93   Temp 97.5 °F (36.4 °C)   Resp 16   Ht 157.5 cm (62.01\")   Wt 67.2 kg (148 lb 3.2 oz)   LMP  (LMP Unknown)   SpO2 99%   BMI 27.10 kg/m²   who presents today for follow up of Depression and Anxiety.  She reports as noted on last visit she was having breakthrough depression " and anxiety is controlled and not exacerbated by taking Wellbutrin.  Avoiding SSRIs due to past weight gain.  PHQ-9 last visit went from 18-10 did increase Wellbutrin due to the PHQ-9 score and that is working well..  Overall depression does feel well controlled on this medication. Onset of symptoms was approximately several months ago. She denies current suicidal and homicidal ideation. Risk factors are family history of anxiety and or depression, lifestyle of multiple roles and caring for elderly parent.  Previous treatment includes current Rx. She complains of the following medication side effects:none. The patient declines to go to counseling..  PHQ-9 had been and 18 last visit was 10 and did increase Wellbutrin again due to breakthrough depression on that 4/19/2022 visit and this is working very well.  Her PHQ-9 is now 6,  Last labs August and goals met  Remains on Adderall working well for ADD.  Zoloft made her gain weight--last Rx 2016  Consider Lexapr  Has seen Dr Cristino miller----asthma controlled on meds  The following portions of the patient's history were reviewed and updated as appropriate: allergies, current medications, past family history, past medical history, past social history, past surgical history and problem list.    Review of Systems   Constitutional: Negative for activity change, appetite change and unexpected weight change.   HENT: Negative for nosebleeds and trouble swallowing.    Eyes: Negative for pain and visual disturbance.   Respiratory: Negative for chest tightness, shortness of breath and wheezing.    Cardiovascular: Negative for chest pain and palpitations.   Gastrointestinal: Negative for abdominal pain and blood in stool.   Endocrine: Negative.    Genitourinary: Negative for difficulty urinating and hematuria.   Musculoskeletal: Negative for joint swelling.   Skin: Negative for color change and rash.   Allergic/Immunologic: Negative.    Neurological: Negative for syncope and  speech difficulty.   Hematological: Negative for adenopathy.   Psychiatric/Behavioral: Negative for agitation, confusion and dysphoric mood. The patient is not nervous/anxious.    All other systems reviewed and are negative.      Objective   Physical Exam  Vitals and nursing note reviewed.   Constitutional:       General: She is not in acute distress.     Appearance: She is well-developed. She is not ill-appearing or toxic-appearing.   HENT:      Head: Normocephalic.      Right Ear: External ear normal.      Left Ear: External ear normal.      Nose: Nose normal.      Mouth/Throat:      Pharynx: Oropharynx is clear.   Eyes:      General: No scleral icterus.     Conjunctiva/sclera: Conjunctivae normal.      Pupils: Pupils are equal, round, and reactive to light.   Neck:      Thyroid: No thyromegaly.   Cardiovascular:      Rate and Rhythm: Normal rate and regular rhythm.      Heart sounds: Normal heart sounds. No murmur heard.  Pulmonary:      Effort: Pulmonary effort is normal. No respiratory distress.      Breath sounds: Normal breath sounds. No rales.   Musculoskeletal:         General: No deformity. Normal range of motion.      Cervical back: Normal range of motion and neck supple.   Skin:     General: Skin is warm and dry.      Findings: No rash.   Neurological:      General: No focal deficit present.      Mental Status: She is alert and oriented to person, place, and time. Mental status is at baseline.   Psychiatric:         Mood and Affect: Mood normal.         Behavior: Behavior normal.         Thought Content: Thought content normal.         Judgment: Judgment normal.         Assessment & Plan   There are no diagnoses linked to this encounter.  Continue Wellbutrin for anxiety depression note PHQ-9 is much improved now 6  Continue Adderall for ADD diagnosis working well  Plan, Lissette Hawthorne, was seen today.  she was seen for HTN and continue medication, Imparied fasting glucose and plan follow up labs,  diet, and exercise, GERD and will continue on PPI medication, Asthma and is well controlled on medication.   and Vitamin D deficiency and will update labs .

## 2022-06-12 RX ORDER — ALBUTEROL SULFATE 90 UG/1
AEROSOL, METERED RESPIRATORY (INHALATION)
Qty: 18 G | Refills: 3 | Status: SHIPPED | OUTPATIENT
Start: 2022-06-12

## 2022-06-22 ENCOUNTER — PATIENT MESSAGE (OUTPATIENT)
Dept: FAMILY MEDICINE CLINIC | Facility: CLINIC | Age: 47
End: 2022-06-22

## 2022-06-22 RX ORDER — DEXTROAMPHETAMINE SACCHARATE, AMPHETAMINE ASPARTATE, DEXTROAMPHETAMINE SULFATE AND AMPHETAMINE SULFATE 7.5; 7.5; 7.5; 7.5 MG/1; MG/1; MG/1; MG/1
30 TABLET ORAL 2 TIMES DAILY
Qty: 60 TABLET | Refills: 0 | Status: SHIPPED | OUTPATIENT
Start: 2022-06-22 | End: 2022-07-21 | Stop reason: SDUPTHER

## 2022-06-22 NOTE — TELEPHONE ENCOUNTER
From: Lissette Hawthorne  To: Trang Doherty PA-C  Sent: 6/22/2022 6:19 AM EDT  Subject: Refill    Good morning,    Will you please send my Adderall prescription to [facility].     Thank you.

## 2022-07-01 NOTE — ADDENDUM NOTE
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 The patient was recently discharged from [facility] on 6/28 following an extended stay due to multiple complex diagnoses as noted above. Patient notes she is on 2L O2 at SNF. Overnight she developed worsening shortness of breath and chest pain with inspiration. WBCs were checked at the nursing home today and were elevated to 24,000 and EMS was contacted. Per EMS, the patient was found to be hypoxic and required 4L O2 during transport. On arrival, the patient was transitioned to BiPAP due to respiratory distress. Of note, the patient currently has a PICC line in place for current antibiotic management of MAC. She endorses diarrhea which has been chronic since she started her MAC antibiotic regimen. She notes she has had a couple episodes of reflux, however denies vomiting. She denies fevers or chills as well.        Past Medical History:   Diagnosis Date   • Abnormal finding on breast imaging 09/10/2020   • Acute renal failure (ARF) (CMS/HCC)    • Anxiety attack 6/30/2022   • Asthma    • Benign colonic polyp 06/04/2013   • Cholecystitis 02/03/2021   • Cholecystitis    • COVID-19 long hauler    • COVID-19 virus infection 01/11/2022   • Fibroids 09/15/2020   • Herpes zoster without complication 01/24/2022    Right side of back.   • History of cholecystectomy    • Hypokalemic periodic paralysis    • Iron deficiency anemia due to chronic blood loss 12/05/2020   • Lupus nephritis  Addended by: VINCE LANE on: 8/21/2018 07:25 AM     Modules accepted: Orders     (CMS/HCC)    • Lymph node histopathology and flow cytometry consistent with benign reactive lymphadenitis from Sjogren's disease.  2021   • On prednisone therapy    • Pneumonia of right lower lobe due to infectious organism 2022   • Renal tubular acidosis, type 1    • Secondary hyperparathyroidism of renal origin (CMS/HCC) 2/10/2022   • Seropositive rheumatoid arthritis (CMS/HCC)    • Sjogren's syndrome (CMS/HCC)    • Systemic lupus erythematosus (CMS/HCC)    • Thrush 2022       Past Surgical History:   Procedure Laterality Date   • Appendectomy     • Breast biopsy Right    • Colonoscopy     • Egd     • Hysterectomy     • Intussusception repair      PE Tubes   • Laparoscopic cholecystectomy  2021    ROBOTIC-ASSISTED LAPAROSCOPIC CHOLECYSTECTOMY, -[facility]   • Tubal ligation          Family History   Problem Relation Age of Onset   • Diabetes Mother    • Hypertension Mother    • Cancer Father         lung   • Hypertension Sister    • Diabetes Sister    • Hypertension Sister    • Diabetes Sister    • Hypertension Sister    • Diabetes Sister    • Hypertension Maternal Grandmother    • Stroke Maternal Grandmother    • Other Neg Hx         periodic paralysis   • Cancer, Breast Neg Hx    • Cancer, Colon Neg Hx    • Cancer, Ovarian Neg Hx        Social History     Tobacco Use   • Smoking status: Former Smoker     Packs/day: 0.00     Years: 29.00     Pack years: 0.00     Types: Cigarettes     Start date: 1993     Quit date: 10/11/2021     Years since quittin.7   • Smokeless tobacco: Never Used   Substance Use Topics   • Alcohol use: Never        Current Facility-Administered Medications   Medication Dose Route Frequency Provider Last Rate Last Admin   • vancomycin 1,250 mg in sodium chloride 0.9% 250 mL IVPB  1,250 mg Intravenous Once Mallory Leung MD       • meropenem (MERREM) 500 mg in sodium chloride 0.9 % 100 mL IVPB  500 mg Intravenous Once Mallory Leung   mL/hr at 07/01/22 1250 500 mg at 07/01/22 1250     Current Outpatient Medications   Medication Sig Dispense Refill   • FOSCARNET SODIUM IV Inject 2.1 g into the vein 3 days a week.     • loperamide (IMODIUM) 2 MG capsule Take 2 mg by mouth 4 times daily as needed.     • famotidine (PEPCID) 20 MG tablet Take 20 mg by mouth daily.     • acetaminophen (TYLENOL) 325 MG tablet Take 2 tablets by mouth every 4 hours as needed for Pain. 30 tablet 0   • albuterol (VENTOLIN) (5 MG/ML) 0.5% nebulizer solution Take 0.5 mLs by nebulization Every 4 hours as needed for Shortness of Breath or Wheezing. 60 mL 12   • hydroxychloroquine (PLAQUENIL) 200 MG tablet Take 1 tablet by mouth daily (with breakfast). Do not start before June 29, 2022.     • azithromycin (ZITHROMAX) 500 MG tablet Take 1 tablet by mouth.     • ethambutol (MYAMBUTOL) 400 MG tablet 2 tablets by OG Tube route 3 days a week.     • lisinopril (ZESTRIL) 20 MG tablet Take 1 tablet by mouth every evening.     • LORazepam (ATIVAN) 0.5 MG tablet Take 0.5 mg by mouth 3 times daily as needed.     • metoPROLOL tartrate (LOPRESSOR) 100 MG tablet Take 1 tablet by mouth every 12 hours.     • rifAMPin, RIFADIN, 25 MG/ML (compounded) suspension 24 mLs by OG Tube route. [facility]: Contact [facility] for compounding recipe, if needed.     • famotidine (PEPCID) 20 MG tablet Take 1 tablet by mouth daily. 90 tablet 0   • predniSONE (DELTASONE) 5 MG tablet Take 1 tablet by mouth daily.     • DISPENSE Inject 2,100 mg into the vein 3 days a week for 15 days. Indications: Cytomegalovirus Foscarnet 2.1 g IV MWF after HD, end date: 7/14 1 each 0   • bumetanide (BUMEX) 2 MG tablet Take 2 mg by mouth 4 days a week.     • B complex-vitamin C-folic acid (NEPHRO-DEAN) 0.8 MG Tab Take 1 tablet by mouth daily. Do not start before April 27, 2022. 30 tablet 1   • erythromycin (ILOTYCIN) ophthalmic ointment Place into left eye 4 times daily. 3.5 g 0   • CARBOXYMethylcellulose (REFRESH PLUS) 0.5 %  ophthalmic solution Place 1 drop into both eyes 3 times daily. (Patient taking differently: Place 1 drop into both eyes 3 times daily as needed. ) 70 each 11        ALLERGIES:   Allergen Reactions   • Bee Sting ANAPHYLAXIS   • Shellfish Allergy   (Food Or Med) SHORTNESS OF BREATH   • Dapsone Other (See Comments)     methemoglobinemia   • Alcohol   (Food Or Med) RASH   • Fish   (Food Or Med) RASH       Review of Systems:   Constitutional: Negative for activity change.   HEENT: Negative for congestion, eye pain, sore throat.     Respiratory: Endorses shortness of breath, cough, chest pain with inspiration  Cardiovascular: Negative for palpitations  Gastrointestinal: Endorses diarrhea and reflux, negative for abdominal pain, blood in stool  Genitourinary: Negative for dysuria, hematuria, polyuria  Musculoskeletal: Negative for back pain.   Skin: Negative for rashes, lesions, or color change.   Neurological: Negative for headaches.  Psychiatric/Behavioral: Negative for confusion.     Objective:   Physical Exam:  Visit Vitals  BP (!) 148/104   Pulse (!) 102   Temp 97.3 °F (36.3 °C) (Tympanic)   Resp (!) 31   Ht 5' 3\" (1.6 m)   Wt 50 kg (110 lb 3.7 oz)   LMP 09/08/2020 (LMP Unknown) Comment: hysterectomy   SpO2 100%   BMI 19.53 kg/m²        General: Ill appearing, in acute distress  HEENT: EMOI, BiPAP in place  CV:  Tachycardic, regular rhythm, no murmurs, rubs or gallops  Lungs: Left sided end inspiratory crackles, tachypneic, supraclavicular retractions  Abd: Nontender, nondistended, PEG tub in place, no erythema or exudate  Ext: No edema  Skin: Warm and dry  Neuro: A&Ox3, no focal deficit     Lab/Data Reviewed:  Recent Results (from the past 24 hour(s))   Electrocardiogram 12-Lead    Collection Time: 07/01/22 10:34 AM   Result Value Ref Range    Ventricular Rate EKG/Min (BPM) 106     Atrial Rate (BPM) 106     MD-Interval (MSEC) 164     QRS-Interval (MSEC) 84     QT-Interval (MSEC) 358     QTc 476     P Axis (Degrees)  80     R Axis (Degrees) 61     T Axis (Degrees) 61     REPORT TEXT       Sinus tachycardia  Consider left ventricular hypertrophy     COVID/Flu/RSV panel    Collection Time: 07/01/22 10:41 AM   Result Value Ref Range    Rapid SARS-COV-2 by PCR Not Detected Not Detected / Detected / Presumptive Positive / Inhibitors present    Influenza A by PCR Not Detected Not Detected    Influenza B by PCR Not Detected Not Detected    RSV BY PCR Not Detected Not Detected    Isolation Guidelines      Procedural Comment     Basic Metabolic Panel    Collection Time: 07/01/22 10:51 AM   Result Value Ref Range    Fasting Status      Sodium 126 (L) 135 - 145 mmol/L    Potassium 3.9 3.4 - 5.1 mmol/L    Chloride 93 (L) 97 - 110 mmol/L    Carbon Dioxide 22 21 - 32 mmol/L    Anion Gap 15 7 - 19 mmol/L    Glucose 120 (H) 70 - 99 mg/dL    BUN 49 (H) 6 - 20 mg/dL    Creatinine 2.57 (H) 0.51 - 0.95 mg/dL    Glomerular Filtration Rate 22 (L) >=60    BUN/ Creatinine Ratio 19 7 - 25    Calcium 10.3 (H) 8.4 - 10.2 mg/dL   Lactic Acid Venous With Reflex    Collection Time: 07/01/22 10:51 AM   Result Value Ref Range    Lactate, Venous 1.8 0.0 - 2.0 mmol/L   TROPONIN I, HIGH SENSITIVITY    Collection Time: 07/01/22 10:51 AM   Result Value Ref Range    Troponin I, High Sensitivity 14 <52 ng/L   NT proBNP    Collection Time: 07/01/22 10:51 AM   Result Value Ref Range    NT-proBNP 40,708 (H) <=125 pg/mL   Partial Thromboplastin Time    Collection Time: 07/01/22 10:51 AM   Result Value Ref Range    PTT 35 (H) 22 - 30 sec   Prothrombin Time    Collection Time: 07/01/22 10:51 AM   Result Value Ref Range    Prothrombin Time 11.4 9.7 - 11.8 sec    INR 1.1     CBC with Automated Differential (performable only)    Collection Time: 07/01/22 10:51 AM   Result Value Ref Range    WBC 42.3 (H) 4.2 - 11.0 K/mcL    RBC 3.00 (L) 4.00 - 5.20 mil/mcL    HGB 9.5 (L) 12.0 - 15.5 g/dL    HCT 28.1 (L) 36.0 - 46.5 %    MCV 93.7 78.0 - 100.0 fl    MCH 31.7 26.0 - 34.0 pg     MCHC 33.8 32.0 - 36.5 g/dL    RDW-CV 17.9 (H) 11.0 - 15.0 %    RDW-SD 59.8 (H) 39.0 - 50.0 fL     (H) 140 - 450 K/mcL    NRBC 0 <=0 /100 WBC    Neutrophil, Percent 94 %    Lymphocytes, Percent 1 %    Mono, Percent 2 %    Eosinophils, Percent 0 %    Basophils, Percent 0 %    Immature Granulocytes 3 %    Absolute Neutrophils 39.2 (H) 1.8 - 7.7 K/mcL    Absolute Lymphocytes 0.6 (L) 1.0 - 4.8 K/mcL    Absolute Monocytes 1.0 (H) 0.3 - 0.9 K/mcL    Absolute Eosinophils  0.1 0.0 - 0.5 K/mcL    Absolute Basophils 0.1 0.0 - 0.3 K/mcL    Absolute Immmature Granulocytes 1.3 (H) 0.0 - 0.2 K/mcL   Blood Gas, Venous    Collection Time: 07/01/22 10:51 AM   Result Value Ref Range    pH, Venous 7.37 7.35 - 7.45 Units    pCO2, Venous 33 (L) 38 - 51 mm Hg    pO2, Venous 134 (H) 35 - 42 mm Hg    HCO3, Venous 19 (L) 22 - 28 mmol/L    Base Excess/ Deficit, Venous -6 (L) -2 - 2 mmol/L    O2 Saturation, Venous 99 (H) 60 - 80 %    Oxyhemoglobin, Venous 96 (H) 60 - 80 %    Hemoglobin, Blood Gas 9.5 (L) 12.0 - 15.5 g/dL     EXAM: XR CHEST PA OR AP 1 VIEW     CLINICAL INDICATION: Dyspnea     COMPARISON: 06/20/2022      TECHNIQUE: Single frontal view of the chest was performed.     FINDINGS: There are extensive diffuse bilateral pulmonary airspace and  interstitial opacities. There are bilateral loculated pleural effusions.  Both findings are unchanged compared to prior exam. There is no  pneumothorax.     Central venous catheter is unchanged in position.     IMPRESSION:      Stable diffuse bilateral pulmonary opacities and bilateral pleural  effusions compared to 06/20/2022.        Electronically Signed by: BLANCA ELLIS M.D.   Signed on: 7/1/2022 12:14 PM          IMPRESSION   Sepsis with acute hypoxic respiratory failure secondary to pneumonia   -Chest XR showing stable diffuse bilateral pulmonary infiltrates compared to previous imaging on 6/20   -Pt with recent history of ESBL-Klebsiella pna and MAC pna   -Pt tachycardic, tachypneic,  elevated WBC to 42.3   -Procal elevated, lactic acid WNL    Leukocytosis  Thrombocytosis  Recent disseminated VZV  Resent ESBL klebsiella sepsis  Lupus nephritis with ESRD on HD  Dysphagia s/p PEG tube on 6/21     PLAN   Continue Vancomycin and Meropenem   Pt receiving Foscarnet 3 days a week, consider continuing  Consider checking CMV blood levels   Going for urgent HD today  Pt transferred to ICU for close monitoring    Will discuss the above plan with the Attending Physician. Please wait for final recommendations.     Yousif Minor, [address]

## 2022-07-20 RX ORDER — MONTELUKAST SODIUM 10 MG/1
TABLET ORAL
Qty: 90 TABLET | Refills: 3 | Status: SHIPPED | OUTPATIENT
Start: 2022-07-20

## 2022-07-21 RX ORDER — DEXTROAMPHETAMINE SACCHARATE, AMPHETAMINE ASPARTATE, DEXTROAMPHETAMINE SULFATE AND AMPHETAMINE SULFATE 7.5; 7.5; 7.5; 7.5 MG/1; MG/1; MG/1; MG/1
30 TABLET ORAL 2 TIMES DAILY
Qty: 60 TABLET | Refills: 0 | Status: SHIPPED | OUTPATIENT
Start: 2022-07-21 | End: 2022-08-19 | Stop reason: SDUPTHER

## 2022-07-21 NOTE — TELEPHONE ENCOUNTER
----- Message from Lissette Hawthorne sent at 7/21/2022  6:04 AM EDT -----  Regarding: Prescription refill   Good morning,    Can you please send my Adderall prescription to [facility]?    Thank you

## 2022-08-16 RX ORDER — AMLODIPINE BESYLATE 5 MG/1
TABLET ORAL
Qty: 90 TABLET | Refills: 1 | Status: SHIPPED | OUTPATIENT
Start: 2022-08-16 | End: 2022-11-06 | Stop reason: SDUPTHER

## 2022-08-19 ENCOUNTER — OFFICE VISIT (OUTPATIENT)
Dept: FAMILY MEDICINE CLINIC | Facility: CLINIC | Age: 47
End: 2022-08-19

## 2022-08-19 VITALS
HEIGHT: 62 IN | HEART RATE: 96 BPM | OXYGEN SATURATION: 100 % | RESPIRATION RATE: 16 BRPM | SYSTOLIC BLOOD PRESSURE: 122 MMHG | BODY MASS INDEX: 25.95 KG/M2 | WEIGHT: 141 LBS | TEMPERATURE: 97.5 F | DIASTOLIC BLOOD PRESSURE: 67 MMHG

## 2022-08-19 DIAGNOSIS — F98.8 ATTENTION DEFICIT DISORDER (ADD) WITHOUT HYPERACTIVITY: Primary | ICD-10-CM

## 2022-08-19 DIAGNOSIS — I10 ESSENTIAL HYPERTENSION: ICD-10-CM

## 2022-08-19 PROCEDURE — 99213 OFFICE O/P EST LOW 20 MIN: CPT | Performed by: PHYSICIAN ASSISTANT

## 2022-08-19 RX ORDER — DEXTROAMPHETAMINE SACCHARATE, AMPHETAMINE ASPARTATE, DEXTROAMPHETAMINE SULFATE AND AMPHETAMINE SULFATE 7.5; 7.5; 7.5; 7.5 MG/1; MG/1; MG/1; MG/1
30 TABLET ORAL 2 TIMES DAILY
Qty: 60 TABLET | Refills: 0 | Status: SHIPPED | OUTPATIENT
Start: 2022-08-19 | End: 2022-09-20 | Stop reason: SDUPTHER

## 2022-08-19 NOTE — PROGRESS NOTES
Subjective   Lissette Hawthorne is a 47 y.o. female.     History of Present Illness   Lissette Hawthorne 47 y.o. female who presents for ADD/ADHD follow up. They are well controlled on their current Rx.  No new problems with insomnia, tachycardia, chest pain, or weight loss does not explain by her vigorous effort at diet and exercise. The patient has read and signed the [facility] Controlled Substance Contract.  I will continue to see patient for regular follow up appointments and give the lowest effective dose.  They are well controlled on their medication at the lowest effective dose.  She has a previous history of a diagnosis of  ADD/ADHD.   ASIF has been reviewed by me and is updated every 3 months. The patient is aware of the potential for addiction and dependence.  The Rx continues to help them concentrate, finish tasks in timely manor, and succeed.  She denies current suicidal and homicidal ideation.  Reviewed causes for potential of discontinuation of stimulant medication,  including but not limited to consideration for diversion, obtaining other controlled substances from other license practitioners, or  inappropriate office behavior.  Patient understands to use a controlled substance as prescribed by physician and to avoid improper use of controlled substances.  Patient will also verbalized understanding that prescriptions to be filled at the same pharmacy  unless office staff is made aware of this.  Patient understands they can be submitted to random urine drug screens and/or random pill counts on any request.  Patient understands they are not to receive early refills.  The patient must produce an official police report for any effort to replace controlled substances that are lost or stolen.  No use of illegal street drugs while receiving controlled substances from this prescribing provider.  The patient is to take medication as prescribed  and not deviate from the normal schedule without  consultation from the provider.  Patient is not to share the medication with others or to take medication with alcohol or other sedatives.  Use caution when driving or operating machinery.  Must always keep the prescription in the original container.  Patient is to store controlled substances in a locked cabinet or other secure storage unit that is cool, dry and out of sunlight.  Patient must immediately notify office if this controlled substances is  improperly taken by another individual.  Reviewed risk for physical dependence, tolerance and addiction.    There is no evidence of aberrant behavior or any red flags.  .       GERD is controlled on PPI Rx.  Here for follow up for Essential Hypertension and well controlled on medication and continue Rx.    Wellbutrin XL working well for her depression and stress    Last labs August and goals met  Remains on Adderall working well for ADD.  Zoloft made her gain weight--last Rx 2016  Remains on Adderall working well for ADD.  The following portions of the patient's history were reviewed and updated as appropriate: allergies, current medications, past family history, past medical history, past social history, past surgical history and problem list.    Review of Systems   Constitutional: Negative for activity change, appetite change and unexpected weight change.   HENT: Negative for nosebleeds and trouble swallowing.    Eyes: Negative for pain and visual disturbance.   Respiratory: Negative for chest tightness, shortness of breath and wheezing.    Cardiovascular: Negative for chest pain and palpitations.   Gastrointestinal: Negative for abdominal pain and blood in stool.   Endocrine: Negative.    Genitourinary: Negative for difficulty urinating and hematuria.   Musculoskeletal: Negative for joint swelling.   Skin: Negative for color change and rash.   Allergic/Immunologic: Negative.    Neurological: Negative for syncope and speech difficulty.   Hematological: Negative for  adenopathy.   Psychiatric/Behavioral: Positive for decreased concentration. Negative for agitation and confusion.   All other systems reviewed and are negative.      Objective   Physical Exam  Vitals and nursing note reviewed.   Constitutional:       General: She is not in acute distress.     Appearance: She is well-developed. She is not ill-appearing or toxic-appearing.   HENT:      Head: Normocephalic.      Right Ear: External ear normal.      Left Ear: External ear normal.      Nose: Nose normal.      Mouth/Throat:      Pharynx: Oropharynx is clear.   Eyes:      General: No scleral icterus.     Conjunctiva/sclera: Conjunctivae normal.      Pupils: Pupils are equal, round, and reactive to light.   Neck:      Thyroid: No thyromegaly.   Cardiovascular:      Rate and Rhythm: Normal rate and regular rhythm.      Heart sounds: Normal heart sounds. No murmur heard.  Pulmonary:      Effort: Pulmonary effort is normal. No respiratory distress.      Breath sounds: Normal breath sounds.   Musculoskeletal:         General: No deformity. Normal range of motion.      Cervical back: Normal range of motion and neck supple.   Skin:     General: Skin is warm and dry.      Findings: No rash.   Neurological:      General: No focal deficit present.      Mental Status: She is alert and oriented to person, place, and time. Mental status is at baseline.   Psychiatric:         Mood and Affect: Mood normal.         Behavior: Behavior normal.         Thought Content: Thought content normal.         Judgment: Judgment normal.         Assessment & Plan   Diagnoses and all orders for this visit:    1. Attention deficit disorder (ADD) without hyperactivity (Primary)    2. Essential hypertension  Comments:  stable on Amlodipine      Continue amlodipine for hypertension working well  Pending lab order need to update labs--not on thyroid  Remains on Adderall working well for ADD.  Wellbutrin XL working well for her depression and stress      Answers for HPI/ROS submitted by the patient on 8/18/2022  Please describe your symptoms.: ADD check up  Have you had these symptoms before?: Yes  How long have you been having these symptoms?: Greater than 2 weeks  What is the primary reason for your visit?: Other

## 2022-08-19 NOTE — PATIENT INSTRUCTIONS
Attention Deficit Hyperactivity Disorder, Adult  Attention deficit hyperactivity disorder (ADHD) is a mental health disorder that starts during childhood (neurodevelopmental disorder). For many people with ADHD, the disorder continues into the adult years. Treatment can help you manage your symptoms.  What are the causes?  The exact cause of ADHD is not known. Most experts believe genetics and environmental factors contribute to ADHD.  What increases the risk?  The following factors may make you more likely to develop this condition:  Having a family history of ADHD.  Being male.  Being born to a mother who smoked or drank alcohol during pregnancy.  Being exposed to lead or other toxins in the womb or early in life.  Being born before 37 weeks of pregnancy (prematurely) or at a low birth weight.  Having experienced a brain injury.  What are the signs or symptoms?  Symptoms of this condition depend on the type of ADHD. The two main types are inattentive and hyperactive-impulsive. Some people may have symptoms of both types.  Symptoms of the inattentive type include:  Difficulty paying attention.  Making careless mistakes.  Not following instructions.  Being disorganized.  Avoiding tasks that require time and attention.  Losing and forgetting things.  Being easily distracted.  Symptoms of the hyperactive-impulsive type include:  Restlessness.  Talking too much.  Interrupting.  Difficulty with:  Sitting still.  Feeling motivated.  Relaxing.  Waiting in line or waiting for a turn.  In adults, this condition may lead to certain problems, such as:  Keeping jobs.  Performing tasks at work.  Having stable relationships.  Being on time or keeping to a schedule.  How is this diagnosed?  This condition is diagnosed based on your current symptoms and your history of symptoms. The diagnosis can be made by a health care provider such as a primary care provider or a mental health care specialist.  Your health care provider may use  a symptom checklist or a behavior rating scale to evaluate your symptoms. He or she may also want to talk with people who have observed your behaviors throughout your life.  How is this treated?  This condition can be treated with medicines and behavior therapy. Medicines may be the best option to reduce impulsive behaviors and improve attention. Your health care provider may recommend:  Stimulant medicines. These are the most common medicines used for adult ADHD. They affect certain chemicals in the brain (neurotransmitters) and improve your ability to control your symptoms.  A non-stimulant medicine for adult ADHD (atomoxetine). This medicine increases a neurotransmitter called norepinephrine. It may take weeks to months to see effects from this medicine.  Counseling and behavioral management are also important for treating ADHD. Counseling is often used along with medicine. Your health care provider may suggest:  Cognitive behavioral therapy (CBT). This type of therapy teaches you to replace negative thoughts and actions with positive thoughts and actions. When used as part of ADHD treatment, this therapy may also include:  Coping strategies for organization, time management, impulse control, and stress reduction.  Mindfulness and meditation training.  Behavioral management. You may work with a  who is specially trained to help people with ADHD manage and organize activities and function more effectively.  Follow these instructions at home:  Medicines    Take over-the-counter and prescription medicines only as told by your health care provider.  Talk with your health care provider about the possible side effects of your medicines and how to manage them.    Lifestyle    Do not use drugs.  Do not drink alcohol if:  Your health care provider tells you not to drink.  You are pregnant, may be pregnant, or are planning to become pregnant.  If you drink alcohol:  Limit how much you use to:  0-1 drink a day for  women.  0-2 drinks a day for men.  Be aware of how much alcohol is in your drink. In the [place] one drink equals one 12 oz bottle of beer (355 mL), one 5 oz glass of wine (148 mL), or one 1½ oz glass of hard liquor (44 mL).  Get enough sleep.  Eat a healthy diet.  Exercise regularly. Exercise can help to reduce stress and anxiety.    General instructions  Learn as much as you can about adult ADHD, and work closely with your health care providers to find the treatments that work best for you.  Follow the same schedule each day.  Use reminder devices like notes, calendars, and phone apps to stay on time and organized.  Keep all follow-up visits as told by your health care provider and therapist. This is important.  Where to find more information  A health care provider may be able to recommend resources that are available online or over the phone. You could start with:  Attention Deficit Disorder Association (ADDA): www.add.org  National [place] of Mental Health (NIM): www.nimh.nih.gov  Contact a health care provider if:  Your symptoms continue to cause problems.  You have side effects from your medicine, such as:  Repeated muscle twitches, coughing, or speech outbursts.  Sleep problems.  Loss of appetite.  Dizziness.  Unusually fast heartbeat.  Stomach pains.  Headaches.  You are struggling with anxiety, depression, or substance abuse.  Get help right away if you:  Have a severe reaction to a medicine.  If you ever feel like you may hurt yourself or others, or have thoughts about taking your own life, get help right away. You can go to the nearest emergency department or call:  Your local emergency services (911 in the [place]  A suicide crisis helpline, such as the National Suicide Prevention Lifeline at 1-394.311.2483. This is open 24 hours a day.  Summary  ADHD is a mental health disorder that starts during childhood (neurodevelopmental disorder) and often continues into the adult years.  The exact cause of ADHD  is not known. Most experts believe genetics and environmental factors contribute to ADHD.  There is no cure for ADHD, but treatment with medicine, cognitive behavioral therapy, or behavioral management can help you manage your condition.  This information is not intended to replace advice given to you by your health care provider. Make sure you discuss any questions you have with your health care provider.  Document Revised: 05/11/2020 Document Reviewed: 05/11/2020  [employer] Patient Education © 2021 [employer]

## 2022-08-20 LAB
25(OH)D3+25(OH)D2 SERPL-MCNC: 32.5 NG/ML (ref 30–100)
ALBUMIN SERPL-MCNC: 4.6 G/DL (ref 3.8–4.8)
ALBUMIN/GLOB SERPL: 2.2 {RATIO} (ref 1.2–2.2)
ALP SERPL-CCNC: 63 IU/L (ref 44–121)
ALT SERPL-CCNC: 16 IU/L (ref 0–32)
AST SERPL-CCNC: 17 IU/L (ref 0–40)
BASOPHILS # BLD AUTO: 0.1 X10E3/UL (ref 0–0.2)
BASOPHILS NFR BLD AUTO: 1 %
BILIRUB SERPL-MCNC: 0.4 MG/DL (ref 0–1.2)
BUN SERPL-MCNC: 13 MG/DL (ref 6–24)
BUN/CREAT SERPL: 14 (ref 9–23)
CALCIUM SERPL-MCNC: 9.2 MG/DL (ref 8.7–10.2)
CHLORIDE SERPL-SCNC: 102 MMOL/L (ref 96–106)
CHOLEST SERPL-MCNC: 170 MG/DL (ref 100–199)
CO2 SERPL-SCNC: 25 MMOL/L (ref 20–29)
CREAT SERPL-MCNC: 0.95 MG/DL (ref 0.57–1)
EGFRCR-CYS SERPLBLD CKD-EPI 2021: 74 ML/MIN/1.73
EOSINOPHIL # BLD AUTO: 0.2 X10E3/UL (ref 0–0.4)
EOSINOPHIL NFR BLD AUTO: 3 %
ERYTHROCYTE [DISTWIDTH] IN BLOOD BY AUTOMATED COUNT: 12.5 % (ref 11.7–15.4)
FOLATE SERPL-MCNC: 10.4 NG/ML
GLOBULIN SER CALC-MCNC: 2.1 G/DL (ref 1.5–4.5)
GLUCOSE SERPL-MCNC: 86 MG/DL (ref 65–99)
HBA1C MFR BLD: 5.1 % (ref 4.8–5.6)
HCT VFR BLD AUTO: 39.6 % (ref 34–46.6)
HDLC SERPL-MCNC: 90 MG/DL
HGB BLD-MCNC: 13.2 G/DL (ref 11.1–15.9)
IMM GRANULOCYTES # BLD AUTO: 0 X10E3/UL (ref 0–0.1)
IMM GRANULOCYTES NFR BLD AUTO: 0 %
LDLC SERPL CALC-MCNC: 71 MG/DL (ref 0–99)
LYMPHOCYTES # BLD AUTO: 0.7 X10E3/UL (ref 0.7–3.1)
LYMPHOCYTES NFR BLD AUTO: 13 %
MAGNESIUM SERPL-MCNC: 2.2 MG/DL (ref 1.6–2.3)
MCH RBC QN AUTO: 30.8 PG (ref 26.6–33)
MCHC RBC AUTO-ENTMCNC: 33.3 G/DL (ref 31.5–35.7)
MCV RBC AUTO: 93 FL (ref 79–97)
MONOCYTES # BLD AUTO: 0.4 X10E3/UL (ref 0.1–0.9)
MONOCYTES NFR BLD AUTO: 7 %
NEUTROPHILS # BLD AUTO: 4 X10E3/UL (ref 1.4–7)
NEUTROPHILS NFR BLD AUTO: 76 %
PLATELET # BLD AUTO: 313 X10E3/UL (ref 150–450)
POTASSIUM SERPL-SCNC: 4.4 MMOL/L (ref 3.5–5.2)
PROT SERPL-MCNC: 6.7 G/DL (ref 6–8.5)
RBC # BLD AUTO: 4.28 X10E6/UL (ref 3.77–5.28)
SODIUM SERPL-SCNC: 140 MMOL/L (ref 134–144)
T3FREE SERPL-MCNC: 2.9 PG/ML (ref 2–4.4)
T4 FREE SERPL-MCNC: 1.16 NG/DL (ref 0.82–1.77)
TRIGL SERPL-MCNC: 44 MG/DL (ref 0–149)
TSH SERPL DL<=0.005 MIU/L-ACNC: 2.2 UIU/ML (ref 0.45–4.5)
VIT B12 SERPL-MCNC: 613 PG/ML (ref 232–1245)
VLDLC SERPL CALC-MCNC: 9 MG/DL (ref 5–40)
WBC # BLD AUTO: 5.3 X10E3/UL (ref 3.4–10.8)

## 2022-09-13 ENCOUNTER — OFFICE VISIT (OUTPATIENT)
Dept: FAMILY MEDICINE CLINIC | Facility: CLINIC | Age: 47
End: 2022-09-13

## 2022-09-13 VITALS
WEIGHT: 142 LBS | HEIGHT: 62 IN | OXYGEN SATURATION: 99 % | DIASTOLIC BLOOD PRESSURE: 62 MMHG | TEMPERATURE: 97.5 F | BODY MASS INDEX: 26.13 KG/M2 | HEART RATE: 98 BPM | SYSTOLIC BLOOD PRESSURE: 125 MMHG | RESPIRATION RATE: 16 BRPM

## 2022-09-13 DIAGNOSIS — Z00.00 ROUTINE GENERAL MEDICAL EXAMINATION AT A HEALTH CARE FACILITY: Primary | ICD-10-CM

## 2022-09-13 PROCEDURE — 99396 PREV VISIT EST AGE 40-64: CPT | Performed by: PHYSICIAN ASSISTANT

## 2022-09-13 NOTE — PROGRESS NOTES
Subjective   Lissette Hawthorne is a 47 y.o. female.     History of Present Illness     Lissette Hawthorne 47 y.o. female who presents for an Annual Wellness Visit.  she has a history of   Patient Active Problem List   Diagnosis   • ADD (attention deficit disorder)   • Essential hypertension   • Migraine without aura and without status migrainosus, not intractable   • Impaired visual perception   • Gastroesophageal reflux disease without esophagitis   • Abnormal electroencephalogram (EEG)   • Lumbar radiculopathy   • Degeneration of intervertebral disc of lumbar region   • Spinal stenosis of lumbar region   • Herniated lumbar intervertebral disc   • Postoperative state   • Moderate asthma without complication   • Seasonal allergic rhinitis   • Ankylosis of lumbar spine   • Status post lumbar spine operation   • Abnormal finding on MRI of brain   • Hyperosmia   • Dysosmia   • Lumbar degenerative disc disease   • Acute lumbar radiculopathy   .  she has been feeling well.   I  reviewed health maintenance with her as part of my preventative care plan.  She is walking for exercise 5 days a week.  Cont to have low glycemic diet and keeping weight down.  Has history of primary hypertension and impaired fasting glucose noting that with her fantastic job at diet and exercise lowered dose of amlodipine and still well controlled and also A1c was fantastic and normal at 5.1%    Do advise Prevnar 20 d/t hx asthma---pulm DR Gregg  Lab Results   Component Value Date    GLUCOSE 86 08/19/2022    BUN 13 08/19/2022    CREATININE 0.95 08/19/2022    EGFRIFNONA 83 08/17/2021    EGFRIFAFRI 100 08/17/2021    BCR 14 08/19/2022    K 4.4 08/19/2022    CO2 25 08/19/2022    CALCIUM 9.2 08/19/2022    PROTENTOTREF 6.7 08/19/2022    ALBUMIN 4.6 08/19/2022    LABIL2 2.2 08/19/2022    AST 17 08/19/2022    ALT 16 08/19/2022     Lab Results   Component Value Date    HGBA1C 5.1 08/19/2022     Lab Results   Component Value Date    CHLPL 170 08/19/2022     TRIG 44 08/19/2022    HDL 90 08/19/2022    LDL 71 08/19/2022     The 10-year ASCVD risk score (Dmitriy JOSEPH Jr., et al., 2013) is: 0.5%    Values used to calculate the score:      Age: 47 years      Sex: Female      Is Non- : No      Diabetic: No      Tobacco smoker: No      Systolic Blood Pressure: 125 mmHg      Is BP treated: Yes      HDL Cholesterol: 90 mg/dL      Total Cholesterol: 170 mg/dL   Continues to do well on Wellbutrin for depression  Continues PPI and controls GERD  Continues allergy and asthma medicines working well  Sees GYN; does  mammo    The following portions of the patient's history were reviewed and updated as appropriate: allergies, current medications, past family history, past medical history, past social history, past surgical history and problem list.    Review of Systems   Constitutional: Negative for activity change, appetite change and fever.   HENT: Negative for nosebleeds and trouble swallowing.    Eyes: Negative for visual disturbance.   Respiratory: Negative for choking and stridor.    Cardiovascular: Negative for chest pain.   Gastrointestinal: Negative for blood in stool.   Endocrine: Negative for polydipsia.   Genitourinary: Negative for genital sores and hematuria.   Musculoskeletal: Negative for arthralgias and joint swelling.   Skin: Negative for color change and rash.   Allergic/Immunologic: Negative for immunocompromised state.   Neurological: Negative for seizures, facial asymmetry and speech difficulty.   Hematological: Negative for adenopathy.   Psychiatric/Behavioral: Negative for behavioral problems, self-injury and suicidal ideas.       Objective   Physical Exam  Vitals and nursing note reviewed.   Constitutional:       General: She is not in acute distress.     Appearance: Normal appearance. She is well-developed. She is not ill-appearing or toxic-appearing.   HENT:      Head: Normocephalic.      Right Ear: Tympanic membrane, ear canal and  external ear normal.      Left Ear: Tympanic membrane, ear canal and external ear normal.      Ears:      Comments: scarring     Nose: Nose normal.      Mouth/Throat:      Pharynx: Oropharynx is clear.   Eyes:      General: No scleral icterus.     Conjunctiva/sclera: Conjunctivae normal.      Pupils: Pupils are equal, round, and reactive to light.   Neck:      Thyroid: No thyromegaly.      Vascular: No carotid bruit.   Cardiovascular:      Rate and Rhythm: Normal rate and regular rhythm.      Pulses: Normal pulses.      Heart sounds: Normal heart sounds. No murmur heard.  Pulmonary:      Effort: Pulmonary effort is normal. No respiratory distress.      Breath sounds: Normal breath sounds. No rales.   Abdominal:      General: Bowel sounds are normal.      Palpations: Abdomen is soft. There is no mass.      Tenderness: There is no abdominal tenderness. There is no guarding.   Musculoskeletal:         General: No tenderness or deformity. Normal range of motion.      Cervical back: Normal range of motion and neck supple. No tenderness.      Right lower leg: No edema.      Left lower leg: No edema.   Lymphadenopathy:      Cervical: Cervical adenopathy present.   Skin:     General: Skin is warm and dry.      Coloration: Skin is not jaundiced.      Findings: No rash.   Neurological:      General: No focal deficit present.      Mental Status: She is alert and oriented to person, place, and time. Mental status is at baseline.      Cranial Nerves: No cranial nerve deficit.      Sensory: No sensory deficit.      Motor: No weakness.      Coordination: Coordination normal.      Gait: Gait normal.      Deep Tendon Reflexes: Reflexes normal.   Psychiatric:         Mood and Affect: Mood normal.         Behavior: Behavior normal.         Thought Content: Thought content normal.         Judgment: Judgment normal.         Assessment & Plan   Diagnoses and all orders for this visit:    1. Routine general medical examination at [facility]  care facility (Primary)      Low glycemic index diet  Exercise 30 minutes most days of the week  Make sure you get results on any labs or tests we ordered today  We discussed medications and how to take them as prescribed  Sleep 6-8 hours each night if possible  If you have not signed up for MyChart, please activate your code ASAP from your After Visit Summary today    LDL goal <100  HDL goal >60  Triglyceride goal <150  BP goal =<130/80  Fasting glucose <100    Great job diet and exercise and also noting  Plan, Lissette Hawthorne, was seen today.  she was seen for HTN and continue medication, GERD and will continue on PPI medication and Vitamin D deficiency and supplemented.      Do advise Prevnar 20 d/t hx asthma---puldaniel Gregg---we are out

## 2022-09-20 RX ORDER — DEXTROAMPHETAMINE SACCHARATE, AMPHETAMINE ASPARTATE, DEXTROAMPHETAMINE SULFATE AND AMPHETAMINE SULFATE 7.5; 7.5; 7.5; 7.5 MG/1; MG/1; MG/1; MG/1
30 TABLET ORAL 2 TIMES DAILY
Qty: 60 TABLET | Refills: 0 | Status: SHIPPED | OUTPATIENT
Start: 2022-09-20 | End: 2022-10-28 | Stop reason: SDUPTHER

## 2022-09-20 NOTE — TELEPHONE ENCOUNTER
----- Message from Lissette Hawthorne sent at 9/20/2022  6:12 AM EDT -----  Regarding: Adderall   Good morning   Will you please send my Adderall prescription to [facility].     Thank you.

## 2022-09-23 RX ORDER — BUPROPION HYDROCHLORIDE 300 MG/1
TABLET ORAL
Qty: 90 TABLET | Refills: 3 | Status: SHIPPED | OUTPATIENT
Start: 2022-09-23

## 2022-10-28 ENCOUNTER — OFFICE VISIT (OUTPATIENT)
Dept: FAMILY MEDICINE CLINIC | Facility: CLINIC | Age: 47
End: 2022-10-28

## 2022-10-28 VITALS
SYSTOLIC BLOOD PRESSURE: 125 MMHG | DIASTOLIC BLOOD PRESSURE: 80 MMHG | RESPIRATION RATE: 16 BRPM | OXYGEN SATURATION: 100 % | TEMPERATURE: 97.5 F | HEART RATE: 97 BPM | WEIGHT: 149.6 LBS | HEIGHT: 62 IN | BODY MASS INDEX: 27.53 KG/M2

## 2022-10-28 DIAGNOSIS — M51.36 DDD (DEGENERATIVE DISC DISEASE), LUMBAR: ICD-10-CM

## 2022-10-28 DIAGNOSIS — F98.8 ATTENTION DEFICIT DISORDER (ADD) WITHOUT HYPERACTIVITY: Primary | ICD-10-CM

## 2022-10-28 DIAGNOSIS — M54.31 RIGHT SCIATIC NERVE PAIN: ICD-10-CM

## 2022-10-28 DIAGNOSIS — I10 ESSENTIAL HYPERTENSION: ICD-10-CM

## 2022-10-28 PROCEDURE — 90677 PCV20 VACCINE IM: CPT | Performed by: PHYSICIAN ASSISTANT

## 2022-10-28 PROCEDURE — 99213 OFFICE O/P EST LOW 20 MIN: CPT | Performed by: PHYSICIAN ASSISTANT

## 2022-10-28 PROCEDURE — 90471 IMMUNIZATION ADMIN: CPT | Performed by: PHYSICIAN ASSISTANT

## 2022-10-28 RX ORDER — METHYLPREDNISOLONE 4 MG/1
TABLET ORAL
Qty: 21 TABLET | Refills: 0 | Status: SHIPPED | OUTPATIENT
Start: 2022-10-28 | End: 2023-01-16

## 2022-10-28 NOTE — PROGRESS NOTES
"Danielle Hawthorne is a 47 y.o. female.     History of Present Illness    Since the last visit, she has overall felt well.  She has Primary hypertension well-controlled on amlodipine.  Patient here for refill on Adderall for ADD..  We will make sure blood pressure stays controlled with this Rx and note labs are current..  she has been compliant with current medications have reviewed them.  The patient denies medication side effects.   /80 (BP Location: Left arm, Patient Position: Sitting, Cuff Size: Adult)   Pulse 97   Temp 97.5 °F (36.4 °C) (Oral)   Resp 16   Ht 157.5 cm (62.01\")   Wt 67.9 kg (149 lb 9.6 oz)   LMP  (LMP Unknown)   SpO2 100%   BMI 27.36 kg/m²   2 prior lumbar surgeries  Recent sciatica right.  Seeing chiropractor and helping----plan to Rx Medrol for tx.  Results for orders placed or performed in visit on 05/17/22   Comprehensive metabolic panel    Specimen: Blood   Result Value Ref Range    Glucose 86 65 - 99 mg/dL    BUN 13 6 - 24 mg/dL    Creatinine 0.95 0.57 - 1.00 mg/dL    EGFR Result 74 >59 mL/min/1.73    BUN/Creatinine Ratio 14 9 - 23    Sodium 140 134 - 144 mmol/L    Potassium 4.4 3.5 - 5.2 mmol/L    Chloride 102 96 - 106 mmol/L    Total CO2 25 20 - 29 mmol/L    Calcium 9.2 8.7 - 10.2 mg/dL    Total Protein 6.7 6.0 - 8.5 g/dL    Albumin 4.6 3.8 - 4.8 g/dL    Globulin 2.1 1.5 - 4.5 g/dL    A/G Ratio 2.2 1.2 - 2.2    Total Bilirubin 0.4 0.0 - 1.2 mg/dL    Alkaline Phosphatase 63 44 - 121 IU/L    AST (SGOT) 17 0 - 40 IU/L    ALT (SGPT) 16 0 - 32 IU/L   Lipid panel    Specimen: Blood   Result Value Ref Range    Total Cholesterol 170 100 - 199 mg/dL    Triglycerides 44 0 - 149 mg/dL    HDL Cholesterol 90 >39 mg/dL    VLDL Cholesterol Jose R 9 5 - 40 mg/dL    LDL Chol Calc (NIH) 71 0 - 99 mg/dL   TSH    Specimen: Blood   Result Value Ref Range    TSH 2.200 0.450 - 4.500 uIU/mL   Hemoglobin A1c    Specimen: Blood   Result Value Ref Range    Hemoglobin A1C 5.1 4.8 - 5.6 % "   Magnesium    Specimen: Blood   Result Value Ref Range    Magnesium 2.2 1.6 - 2.3 mg/dL   T3, Free    Specimen: Blood   Result Value Ref Range    T3, Free 2.9 2.0 - 4.4 pg/mL   T4, Free    Specimen: Blood   Result Value Ref Range    Free T4 1.16 0.82 - 1.77 ng/dL   Vitamin B12    Specimen: Blood   Result Value Ref Range    Vitamin B-12 613 232 - 1,245 pg/mL   Folate    Specimen: Blood   Result Value Ref Range    Folate 10.4 >3.0 ng/mL   Vitamin D 25 Hydroxy    Specimen: Blood   Result Value Ref Range    25 Hydroxy, Vitamin D 32.5 30.0 - 100.0 ng/mL   CBC and Differential    Specimen: Blood   Result Value Ref Range    WBC 5.3 3.4 - 10.8 x10E3/uL    RBC 4.28 3.77 - 5.28 x10E6/uL    Hemoglobin 13.2 11.1 - 15.9 g/dL    Hematocrit 39.6 34.0 - 46.6 %    MCV 93 79 - 97 fL    MCH 30.8 26.6 - 33.0 pg    MCHC 33.3 31.5 - 35.7 g/dL    RDW 12.5 11.7 - 15.4 %    Platelets 313 150 - 450 x10E3/uL    Neutrophil Rel % 76 Not Estab. %    Lymphocyte Rel % 13 Not Estab. %    Monocyte Rel % 7 Not Estab. %    Eosinophil Rel % 3 Not Estab. %    Basophil Rel % 1 Not Estab. %    Neutrophils Absolute 4.0 1.4 - 7.0 x10E3/uL    Lymphocytes Absolute 0.7 0.7 - 3.1 x10E3/uL    Monocytes Absolute 0.4 0.1 - 0.9 x10E3/uL    Eosinophils Absolute 0.2 0.0 - 0.4 x10E3/uL    Basophils Absolute 0.1 0.0 - 0.2 x10E3/uL    Immature Granulocyte Rel % 0 Not Estab. %    Immature Grans Absolute 0.0 0.0 - 0.1 x10E3/uL   all labs were at goal  GERD and asthma well-controlled  Lissette Hawthorne 47 y.o. female who presents for ADD/ADHD follow up. They are well controlled on their current Rx.  No new problems with insomnia, tachycardia, chest pain, or weight loss. The patient has read and signed the [facility] Controlled Substance Contract. Adderall works well.  I will continue to see patient for regular follow up appointments and give the lowest effective dose.  They are well controlled on their medication at the lowest effective dose.  She has a previous  history of a diagnosis of  ADD/ADHD.   ASIF has been reviewed by me and is updated every 3 months. The patient is aware of the potential for addiction and dependence.  The Rx continues to help them concentrate, finish tasks in timely manor, and succeed.  She denies current suicidal and homicidal ideation.  Reviewed causes for potential of discontinuation of stimulant medication,  including but not limited to consideration for diversion, obtaining other controlled substances from other license practitioners, or  inappropriate office behavior.  Patient understands to use a controlled substance as prescribed by physician and to avoid improper use of controlled substances.  Patient will also verbalized understanding that prescriptions to be filled at the same pharmacy  unless office staff is made aware of this.  Patient understands they can be submitted to random urine drug screens and/or random pill counts on any request.  Patient understands they are not to receive early refills.  The patient must produce an official police report for any effort to replace controlled substances that are lost or stolen.  No use of illegal street drugs while receiving controlled substances from this prescribing provider.  The patient is to take medication as prescribed  and not deviate from the normal schedule without consultation from the provider.  Patient is not to share the medication with others or to take medication with alcohol or other sedatives.  Use caution when driving or operating machinery.  Must always keep the prescription in the original container.  Patient is to store controlled substances in a locked cabinet or other secure storage unit that is cool, dry and out of sunlight.  Patient must immediately notify office if this controlled substances is  improperly taken by another individual.  Reviewed risk for physical dependence, tolerance and addiction.    There is no evidence of aberrant behavior or any red flags.  .          The following portions of the patient's history were reviewed and updated as appropriate: allergies, current medications, past family history, past medical history, past social history, past surgical history and problem list.    Review of Systems    Objective   Physical Exam  Vitals and nursing note reviewed.   Constitutional:       General: She is not in acute distress.     Appearance: She is well-developed. She is not ill-appearing or toxic-appearing.   HENT:      Head: Normocephalic.      Right Ear: External ear normal.      Left Ear: External ear normal.      Nose: Nose normal.      Mouth/Throat:      Pharynx: Oropharynx is clear.   Eyes:      General: No scleral icterus.     Conjunctiva/sclera: Conjunctivae normal.      Pupils: Pupils are equal, round, and reactive to light.   Neck:      Thyroid: No thyromegaly.   Cardiovascular:      Rate and Rhythm: Normal rate and regular rhythm.      Heart sounds: Normal heart sounds. No murmur heard.  Pulmonary:      Effort: Pulmonary effort is normal. No respiratory distress.      Breath sounds: Normal breath sounds.   Musculoskeletal:         General: Tenderness (right mid buttock) present. No deformity. Normal range of motion.      Cervical back: Normal range of motion and neck supple.      Right lower leg: No edema.      Left lower leg: No edema.   Skin:     General: Skin is warm and dry.      Findings: No rash.   Neurological:      General: No focal deficit present.      Mental Status: She is alert and oriented to person, place, and time. Mental status is at baseline.   Psychiatric:         Mood and Affect: Mood normal.         Behavior: Behavior normal.         Thought Content: Thought content normal.         Judgment: Judgment normal.         Assessment & Plan   Diagnoses and all orders for this visit:    1. Attention deficit disorder (ADD) without hyperactivity (Primary)  Comments:  stable and controlled with Adderall    2. Essential  hypertension  Comments:  controlled with Amlodipine    3. Right sciatic nerve pain  Comments:  sent Medrol    Other orders  -     methylPREDNISolone (MEDROL) 4 MG dose pack; follow package directions  Dispense: 21 tablet; Refill: 0  -     Pneumococcal Conjugate Vaccine 20-Valent All      bp controlled on Norvasc  Cont Adderall ---works for ADD  To see chiropractor for right sciatic pain.  Sent Medrol sima for tx  Trying to exercise and keep weight down  GERD and asthma well-controlled  10-29-22 Dr Mas is chicropractor seeing her---she had abn xray--? New disc herniation and failed therapy, need MRI lumbar spine.     Answers for HPI/ROS submitted by the patient on 10/21/2022  Please describe your symptoms.: 3 month ADD follow up  Have you had these symptoms before?: Yes  How long have you been having these symptoms?: Greater than 2 weeks  What is the primary reason for your visit?: Other

## 2022-10-28 NOTE — PROGRESS NOTES
Immunization  Immunization performed in LD by Dorcas Cam MA. Patient tolerated the procedure well without complications.  10/28/22   Dorcas Cam MA    Answers for HPI/ROS submitted by the patient on 10/21/2022  Please describe your symptoms.: 3 month ADD follow up  Have you had these symptoms before?: Yes  How long have you been having these symptoms?: Greater than 2 weeks  What is the primary reason for your visit?: Other

## 2022-10-30 RX ORDER — DEXTROAMPHETAMINE SACCHARATE, AMPHETAMINE ASPARTATE, DEXTROAMPHETAMINE SULFATE AND AMPHETAMINE SULFATE 7.5; 7.5; 7.5; 7.5 MG/1; MG/1; MG/1; MG/1
30 TABLET ORAL 2 TIMES DAILY
Qty: 60 TABLET | Refills: 0 | Status: SHIPPED | OUTPATIENT
Start: 2022-10-30 | End: 2022-11-29 | Stop reason: SDUPTHER

## 2022-11-05 ENCOUNTER — PATIENT MESSAGE (OUTPATIENT)
Dept: FAMILY MEDICINE CLINIC | Facility: CLINIC | Age: 47
End: 2022-11-05

## 2022-11-06 RX ORDER — AMLODIPINE BESYLATE 5 MG/1
5 TABLET ORAL DAILY
Qty: 90 TABLET | Refills: 1 | Status: SHIPPED | OUTPATIENT
Start: 2022-11-06

## 2022-11-06 NOTE — TELEPHONE ENCOUNTER
From: Lissette Hawthorne  To: Trang Doherty PA-C  Sent: 11/5/2022 2:08 PM EDT  Subject: Amlodipine    Hi Trang,     I would [facility]. Will you send a 90 day prescription to them. It will save me quiet a bit [facility].     I can also save a lot if I use them for my Adderall however they only fill 90 day prescriptions and I wasn’t sure that would be an option with the Adderall or not.     Thanks!

## 2022-11-23 ENCOUNTER — HOSPITAL ENCOUNTER (OUTPATIENT)
Dept: MRI IMAGING | Facility: HOSPITAL | Age: 47
Discharge: HOME | End: 2022-11-23
Admitting: PHYSICIAN ASSISTANT

## 2022-11-23 DIAGNOSIS — M54.31 RIGHT SCIATIC NERVE PAIN: ICD-10-CM

## 2022-11-23 DIAGNOSIS — M51.36 DDD (DEGENERATIVE DISC DISEASE), LUMBAR: ICD-10-CM

## 2022-11-23 PROCEDURE — 0 GADOBENATE DIMEGLUMINE 529 MG/ML SOLUTION: Performed by: PHYSICIAN ASSISTANT

## 2022-11-23 PROCEDURE — A9577 INJ MULTIHANCE: HCPCS | Performed by: PHYSICIAN ASSISTANT

## 2022-11-23 PROCEDURE — 72158 MRI LUMBAR SPINE W/O & W/DYE: CPT

## 2022-11-23 RX ADMIN — GADOBENATE DIMEGLUMINE 14 ML: 529 INJECTION, SOLUTION INTRAVENOUS at 15:36

## 2022-11-28 ENCOUNTER — PATIENT MESSAGE (OUTPATIENT)
Dept: FAMILY MEDICINE CLINIC | Facility: CLINIC | Age: 47
End: 2022-11-28

## 2022-11-29 RX ORDER — DEXTROAMPHETAMINE SACCHARATE, AMPHETAMINE ASPARTATE, DEXTROAMPHETAMINE SULFATE AND AMPHETAMINE SULFATE 7.5; 7.5; 7.5; 7.5 MG/1; MG/1; MG/1; MG/1
30 TABLET ORAL 2 TIMES DAILY
Qty: 60 TABLET | Refills: 0 | Status: SHIPPED | OUTPATIENT
Start: 2022-11-29 | End: 2022-12-27 | Stop reason: SDUPTHER

## 2022-11-29 NOTE — TELEPHONE ENCOUNTER
From: Lissette Rea  To: Trang Doherty PA-C  Sent: 11/28/2022 10:06 PM EST  Subject: Adderall     Hi Chayito Costello got two days of medicine left if [facility] I would appreciate it. Also, I got your message about my MRI and I have put a call into my surgeon.     Thanks   Arina

## 2022-12-27 RX ORDER — DEXTROAMPHETAMINE SACCHARATE, AMPHETAMINE ASPARTATE, DEXTROAMPHETAMINE SULFATE AND AMPHETAMINE SULFATE 7.5; 7.5; 7.5; 7.5 MG/1; MG/1; MG/1; MG/1
30 TABLET ORAL 2 TIMES DAILY
Qty: 60 TABLET | Refills: 0 | Status: SHIPPED | OUTPATIENT
Start: 2022-12-27 | End: 2023-01-16 | Stop reason: SDUPTHER

## 2023-01-09 NOTE — PROGRESS NOTES
----- Message from Omer Castellanos MD sent at 1/7/2023  1:46 PM CST -----  Normal PSA level    Normal liver function.  Normal kidney function.  Normal thyroid function.  No anemia.  Excellent diabetic level   Excellent cholesterol.  Recheck labs in July 2023       Subjective   Lissette Hawthorne is a 46 y.o. female.   You have chosen to receive care through a telehealth visit.  Do you consent to use a video/audio connection for your medical care today? Yes    History of Present Illness   Lissette Hawthorne female 46 y.o., LMP  (LMP Unknown)   who presents today for follow up of ADD.  She reports medication is working well, patient desires to continue on Rx, and needs refill and dose of Adderall working well.  No rotations.  No unexplained weight loss and no trouble sleeping.  Denies anxiety and depression and did ask about this today due to her recent death of her father. Onset of symptoms was approximately several years ago.  She denies current suicidal and homicidal ideation. Risk factors are childhood ADD/ADHD.  Previous treatment includes current Rx.  She complains of the following medication side effects: none.   [facility] neuropsych eval 7-1-15    BP controlled with Norvasc 5mg  Asthma controlled on meds  GERD is controlled on PPI Rx.    The following portions of the patient's history were reviewed and updated as appropriate: allergies, current medications, past family history, past medical history, past social history, past surgical history and problem list.    Review of Systems   Constitutional: Negative for activity change, appetite change and unexpected weight change.   HENT: Negative for nosebleeds and trouble swallowing.    Eyes: Negative for pain and visual disturbance.   Respiratory: Negative for chest tightness, shortness of breath and wheezing.    Cardiovascular: Negative for chest pain and palpitations.   Gastrointestinal: Negative for abdominal pain and blood in stool.   Endocrine: Negative.    Genitourinary: Negative for difficulty urinating and hematuria.   Musculoskeletal: Negative for joint swelling.   Skin: Negative for color change and rash.   Allergic/Immunologic: Negative.    Neurological: Negative for syncope and speech difficulty.    Hematological: Negative for adenopathy.   Psychiatric/Behavioral: Positive for decreased concentration. Negative for agitation and confusion.   All other systems reviewed and are negative.      Objective   Physical Exam  Constitutional:       General: She is not in acute distress.     Appearance: Normal appearance. She is well-developed. She is not ill-appearing or diaphoretic.   HENT:      Head: Normocephalic and atraumatic.   Eyes:      General: No scleral icterus.     Conjunctiva/sclera: Conjunctivae normal.   Pulmonary:      Effort: Pulmonary effort is normal. No respiratory distress.   Musculoskeletal:         General: Normal range of motion.      Cervical back: Normal range of motion.   Skin:     General: Skin is dry.      Coloration: Skin is not jaundiced.   Neurological:      General: No focal deficit present.      Mental Status: She is alert and oriented to person, place, and time.      Coordination: Coordination normal.   Psychiatric:         Mood and Affect: Mood normal.         Behavior: Behavior normal.         Thought Content: Thought content normal.         Judgment: Judgment normal.         Assessment/Plan   Diagnoses and all orders for this visit:    1. Attention deficit disorder (ADD) without hyperactivity (Primary)    2. Essential hypertension    Other orders  -     albuterol sulfate HFA (ProAir HFA) 108 (90 Base) MCG/ACT inhaler; Inhale 2 puffs Every 4 (Four) Hours As Needed for Wheezing.  Dispense: 54 g; Refill: 3  -     Fluticasone Furoate-Vilanterol (Breo Ellipta) 100-25 MCG/INH inhaler; Inhale 1 puff Daily. For asthma and rinse mouth after use  Dispense: 60 each; Refill: 11      Cont Adderall for ADD working well  Continue amlodipine for essential hypertension well-controlled  Continue Breo and as needed albuterol for asthma well-controlled

## 2023-01-13 ENCOUNTER — TELEPHONE (OUTPATIENT)
Dept: FAMILY MEDICINE CLINIC | Facility: CLINIC | Age: 48
End: 2023-01-13
Payer: COMMERCIAL

## 2023-01-13 NOTE — TELEPHONE ENCOUNTER
[facility] [id number] [place] [facility] [address] [facility] [phone] [facility] [phone] FX    Relationship: Pharmacy    Best call back number: [phone]    What is the best time to reach you: ANY    Who are you requesting to speak with (clinical staff, provider,  specific staff member): CLINICAL STAFF       What was the call regarding: PHARMACY CALLING TO REQUEST PRIOR AUTHORIZATION FOR Breo Ellipta 100-25 MCG/INH inhaler NO LONGER COVERED UNDER INSURANCE. [phone] [facility]MB[facility] OR ODVAIR    Do you require a callback: YES        ”

## 2023-01-14 RX ORDER — BUDESONIDE AND FORMOTEROL FUMARATE DIHYDRATE 160; 4.5 UG/1; UG/1
2 AEROSOL RESPIRATORY (INHALATION)
Qty: 18 G | Refills: 3 | Status: SHIPPED | OUTPATIENT
Start: 2023-01-14 | End: 2023-01-24

## 2023-01-16 ENCOUNTER — OFFICE VISIT (OUTPATIENT)
Dept: FAMILY MEDICINE CLINIC | Facility: CLINIC | Age: 48
End: 2023-01-16
Payer: COMMERCIAL

## 2023-01-16 VITALS
BODY MASS INDEX: 27.23 KG/M2 | TEMPERATURE: 97.7 F | HEART RATE: 65 BPM | SYSTOLIC BLOOD PRESSURE: 136 MMHG | WEIGHT: 148 LBS | OXYGEN SATURATION: 100 % | HEIGHT: 62 IN | DIASTOLIC BLOOD PRESSURE: 80 MMHG

## 2023-01-16 DIAGNOSIS — J45.40 MODERATE PERSISTENT ASTHMA WITHOUT COMPLICATION: ICD-10-CM

## 2023-01-16 DIAGNOSIS — F90.0 ATTENTION DEFICIT HYPERACTIVITY DISORDER (ADHD), PREDOMINANTLY INATTENTIVE TYPE: Primary | ICD-10-CM

## 2023-01-16 DIAGNOSIS — I10 ESSENTIAL HYPERTENSION: ICD-10-CM

## 2023-01-16 DIAGNOSIS — K21.9 GASTROESOPHAGEAL REFLUX DISEASE WITHOUT ESOPHAGITIS: ICD-10-CM

## 2023-01-16 PROCEDURE — 99213 OFFICE O/P EST LOW 20 MIN: CPT | Performed by: PHYSICIAN ASSISTANT

## 2023-01-16 RX ORDER — DEXTROAMPHETAMINE SACCHARATE, AMPHETAMINE ASPARTATE, DEXTROAMPHETAMINE SULFATE AND AMPHETAMINE SULFATE 7.5; 7.5; 7.5; 7.5 MG/1; MG/1; MG/1; MG/1
30 TABLET ORAL 2 TIMES DAILY
Qty: 60 TABLET | Refills: 0 | Status: SHIPPED | OUTPATIENT
Start: 2023-01-16 | End: 2023-02-27 | Stop reason: SDUPTHER

## 2023-01-16 NOTE — PATIENT INSTRUCTIONS
Attention Deficit Hyperactivity Disorder, Adult  Attention deficit hyperactivity disorder (ADHD) is a mental health disorder that starts during childhood (neurodevelopmental disorder). For many people with ADHD, the disorder continues into the adult years. Treatment can help you manage your symptoms.  What are the causes?  The exact cause of ADHD is not known. Most experts believe genetics and environmental factors contribute to ADHD.  What increases the risk?  The following factors may make you more likely to develop this condition:  Having a family history of ADHD.  Being male.  Being born to a mother who smoked or drank alcohol during pregnancy.  Being exposed to lead or other toxins in the womb or early in life.  Being born before 37 weeks of pregnancy (prematurely) or at a low birth weight.  Having experienced a brain injury.  What are the signs or symptoms?  Symptoms of this condition depend on the type of ADHD. The two main types are inattentive and hyperactive-impulsive. Some people may have symptoms of both types.  Symptoms of the inattentive type include:  Difficulty paying attention.  Making careless mistakes.  Not following instructions.  Being disorganized.  Avoiding tasks that require time and attention.  Losing and forgetting things.  Being easily distracted.  Symptoms of the hyperactive-impulsive type include:  Restlessness.  Talking too much.  Interrupting.  Difficulty with:  Sitting still.  Feeling motivated.  Relaxing.  Waiting in line or waiting for a turn.  In adults, this condition may lead to certain problems, such as:  Keeping jobs.  Performing tasks at work.  Having stable relationships.  Being on time or keeping to a schedule.  How is this diagnosed?  This condition is diagnosed based on your current symptoms and your history of symptoms. The diagnosis can be made by a health care provider such as a primary care provider or a mental health care specialist.  Your health care provider may use  a symptom checklist or a behavior rating scale to evaluate your symptoms. He or she may also want to talk with people who have observed your behaviors throughout your life.  How is this treated?  This condition can be treated with medicines and behavior therapy. Medicines may be the best option to reduce impulsive behaviors and improve attention. Your health care provider may recommend:  Stimulant medicines. These are the most common medicines used for adult ADHD. They affect certain chemicals in the brain (neurotransmitters) and improve your ability to control your symptoms.  A non-stimulant medicine for adult ADHD (atomoxetine). This medicine increases a neurotransmitter called norepinephrine. It may take weeks to months to see effects from this medicine.  Counseling and behavioral management are also important for treating ADHD. Counseling is often used along with medicine. Your health care provider may suggest:  Cognitive behavioral therapy (CBT). This type of therapy teaches you to replace negative thoughts and actions with positive thoughts and actions. When used as part of ADHD treatment, this therapy may also include:  Coping strategies for organization, time management, impulse control, and stress reduction.  Mindfulness and meditation training.  Behavioral management. You may work with a  who is specially trained to help people with ADHD manage and organize activities and function more effectively.  Follow these instructions at home:  Medicines    Take over-the-counter and prescription medicines only as told by your health care provider.  Talk with your health care provider about the possible side effects of your medicines and how to manage them.  Lifestyle    Do not use drugs.  Do not drink alcohol if:  Your health care provider tells you not to drink.  You are pregnant, may be pregnant, or are planning to become pregnant.  If you drink alcohol:  Limit how much you use to:  0-1 drink a day for  women.  0-2 drinks a day for men.  Be aware of how much alcohol is in your drink. In the [place] one drink equals one 12 oz bottle of beer (355 mL), one 5 oz glass of wine (148 mL), or one 1½ oz glass of hard liquor (44 mL).  Get enough sleep.  Eat a healthy diet.  Exercise regularly. Exercise can help to reduce stress and anxiety.  General instructions  Learn as much as you can about adult ADHD, and work closely with your health care providers to find the treatments that work best for you.  Follow the same schedule each day.  Use reminder devices like notes, calendars, and phone apps to stay on time and organized.  Keep all follow-up visits as told by your health care provider and therapist. This is important.  Where to find more information  A health care provider may be able to recommend resources that are available online or over the phone. You could start with:  Attention Deficit Disorder Association (ADDA): www.add.org  National [place] of Mental Health (NIM): www.nimh.nih.gov  Contact a health care provider if:  Your symptoms continue to cause problems.  You have side effects from your medicine, such as:  Repeated muscle twitches, coughing, or speech outbursts.  Sleep problems.  Loss of appetite.  Dizziness.  Unusually fast heartbeat.  Stomach pains.  Headaches.  You are struggling with anxiety, depression, or substance abuse.  Get help right away if you:  Have a severe reaction to a medicine.  If you ever feel like you may hurt yourself or others, or have thoughts about taking your own life, get help right away. You can go to the nearest emergency department or call:  Your local emergency services (911 in the [place]  A suicide crisis helpline, such as the National Suicide Prevention Lifeline at 1-471.840.1514 or 388 in the [place] This is open 24 hours a day.  Summary  ADHD is a mental health disorder that starts during childhood (neurodevelopmental disorder) and often continues into the adult years.  The  exact cause of ADHD is not known. Most experts believe genetics and environmental factors contribute to ADHD.  There is no cure for ADHD, but treatment with medicine, cognitive behavioral therapy, or behavioral management can help you manage your condition.  This information is not intended to replace advice given to you by your health care provider. Make sure you discuss any questions you have with your health care provider.  Document Revised: 07/13/2022 Document Reviewed: 05/11/2020  Elsevier Patient Education © 2022 [employer]

## 2023-01-16 NOTE — PROGRESS NOTES
Subjective   Lissette Rea is a 47 y.o. female.     History of Present Illness       Lissette Rea 47 y.o. female who presents for ADD/ADHD follow up. They are well controlled on their current Rx.  No new problems with insomnia, tachycardia, chest pain, or weight loss. The patient has read and signed the [facility] Controlled Substance Contract.  I will continue to see patient for regular follow up appointments and give the lowest effective dose.  They are well controlled on their medication at the lowest effective dose.  She has a previous history of a diagnosis of  ADD/ADHD.   ASIF has been reviewed by me and is updated every 3 months. The patient is aware of the potential for addiction and dependence.  The Rx continues to help them concentrate, finish tasks in timely manor, and succeed.  She denies current suicidal and homicidal ideation.  Reviewed causes for potential of discontinuation of stimulant medication,  including but not limited to consideration for diversion, obtaining other controlled substances from other license practitioners, or  inappropriate office behavior.  Patient understands to use a controlled substance as prescribed by physician and to avoid improper use of controlled substances.  Patient will also verbalized understanding that prescriptions to be filled at the same pharmacy  unless office staff is made aware of this.  Patient understands they can be submitted to random urine drug screens and/or random pill counts on any request.  Patient understands they are not to receive early refills.  The patient must produce an official police report for any effort to replace controlled substances that are lost or stolen.  No use of illegal street drugs while receiving controlled substances from this prescribing provider.  The patient is to take medication as prescribed  and not deviate from the normal schedule without consultation from the provider.  Patient is not to share the  medication with others or to take medication with alcohol or other sedatives.  Use caution when driving or operating machinery.  Must always keep the prescription in the original container.  Patient is to store controlled substances in a locked cabinet or other secure storage unit that is cool, dry and out of sunlight.  Patient must immediately notify office if this controlled substances is  improperly taken by another individual.  Reviewed risk for physical dependence, tolerance and addiction.    There is no evidence of aberrant behavior or any red flags.  .   7/1/2015 [facility] neuropsych evaluation.  Also noted 1/14/2023 Breo was not formulary for treatment of her asthma and changed to Symbicort 160 mg twice daily  Last visit with me was 10/28/2022 noting patient's primary hypertension was controlled on amlodipine.  Please note patient has angioedema with ACE inhibitor's.  Also noted last visit she was seeing a chiropractor and her 2 prior lumbar surgeries.  And prescribed Medrol Dosepak for sciatica flareup on the right.    Last labs were 8/19/2022    The following portions of the patient's history were reviewed and updated as appropriate: allergies, current medications, past family history, past medical history, past social history, past surgical history and problem list.    Review of Systems   Constitutional: Negative for activity change, appetite change and unexpected weight change.   HENT: Negative for nosebleeds and trouble swallowing.    Eyes: Negative for pain and visual disturbance.   Respiratory: Negative for chest tightness, shortness of breath and wheezing.    Cardiovascular: Negative for chest pain and palpitations.   Gastrointestinal: Negative for abdominal pain and blood in stool.   Endocrine: Negative.    Genitourinary: Negative for difficulty urinating and hematuria.   Musculoskeletal: Negative for joint swelling.   Skin: Negative for color change and rash.   Allergic/Immunologic:  Negative.    Neurological: Negative for syncope and speech difficulty.   Hematological: Negative for adenopathy.   Psychiatric/Behavioral: Positive for decreased concentration. Negative for agitation and confusion.   All other systems reviewed and are negative.      Objective   Physical Exam  Vitals and nursing note reviewed.   Constitutional:       General: She is not in acute distress.     Appearance: She is well-developed. She is not ill-appearing or toxic-appearing.   HENT:      Head: Normocephalic.      Right Ear: External ear normal.      Left Ear: External ear normal.      Nose: Nose normal.      Mouth/Throat:      Pharynx: Oropharynx is clear.   Eyes:      General: No scleral icterus.     Conjunctiva/sclera: Conjunctivae normal.      Pupils: Pupils are equal, round, and reactive to light.   Neck:      Thyroid: No thyromegaly.      Vascular: No carotid bruit.   Cardiovascular:      Rate and Rhythm: Normal rate and regular rhythm.      Pulses: Normal pulses.      Heart sounds: Normal heart sounds. No murmur heard.  Pulmonary:      Effort: Pulmonary effort is normal. No respiratory distress.      Breath sounds: Normal breath sounds. No rales.   Musculoskeletal:         General: No deformity. Normal range of motion.      Cervical back: Normal range of motion and neck supple.      Right lower leg: No edema.      Left lower leg: No edema.   Skin:     General: Skin is warm and dry.      Findings: No rash.   Neurological:      General: No focal deficit present.      Mental Status: She is alert and oriented to person, place, and time. Mental status is at baseline.   Psychiatric:         Mood and Affect: Mood normal.         Behavior: Behavior normal.         Thought Content: Thought content normal.         Judgment: Judgment normal.         Assessment & Plan   Diagnoses and all orders for this visit:    1. Attention deficit hyperactivity disorder (ADHD), predominantly inattentive type (Primary)  Comments:  Adderall  working well continue same dose    2. Moderate persistent asthma without complication  Comments:  Changed Breo to Symbicort due to insurance ----overall asthma is controlled on inhaled medications    3. Gastroesophageal reflux disease without esophagitis    4. Essential hypertension  Comments:  Well-controlled on amlodipine noting ACE inhibitor angioedema allergy        Continue asthma medications noting Symbicort replace Breo due to insurance formulary.  Continues on PPI for GERD working well  Labs due again in August  Continue Adderall working well for ADD  Continue amlodipine working well for primary hypertension     Answers for HPI/ROS submitted by the patient on 1/15/2023  Please describe your symptoms.: 3 month follow up for ADD  Have you had these symptoms before?: Yes  How long have you been having these symptoms?: Greater than 2 weeks  What is the primary reason for your visit?: Other

## 2023-01-24 RX ORDER — BUDESONIDE AND FORMOTEROL FUMARATE DIHYDRATE 160; 4.5 UG/1; UG/1
AEROSOL RESPIRATORY (INHALATION)
Qty: 30.6 EACH | Refills: 1 | Status: SHIPPED | OUTPATIENT
Start: 2023-01-24

## 2023-02-27 ENCOUNTER — PATIENT MESSAGE (OUTPATIENT)
Dept: FAMILY MEDICINE CLINIC | Facility: CLINIC | Age: 48
End: 2023-02-27
Payer: COMMERCIAL

## 2023-02-27 RX ORDER — DEXTROAMPHETAMINE SACCHARATE, AMPHETAMINE ASPARTATE, DEXTROAMPHETAMINE SULFATE AND AMPHETAMINE SULFATE 7.5; 7.5; 7.5; 7.5 MG/1; MG/1; MG/1; MG/1
30 TABLET ORAL 2 TIMES DAILY
Qty: 60 TABLET | Refills: 0 | Status: SHIPPED | OUTPATIENT
Start: 2023-02-27

## 2023-02-27 NOTE — TELEPHONE ENCOUNTER
From: Lissette Rea  To: Trang Doherty  Sent: 2/27/2023 5:55 AM EST  Subject: Adderall     Good morning, Can you send my Adderall prescription to [facility]. Also, they are going to need a new PA for my prescription.     Thank you

## 2023-03-03 ENCOUNTER — TELEPHONE (OUTPATIENT)
Dept: FAMILY MEDICINE CLINIC | Facility: CLINIC | Age: 48
End: 2023-03-03

## 2023-03-03 RX ORDER — DEXTROAMPHETAMINE SACCHARATE, AMPHETAMINE ASPARTATE, DEXTROAMPHETAMINE SULFATE AND AMPHETAMINE SULFATE 7.5; 7.5; 7.5; 7.5 MG/1; MG/1; MG/1; MG/1
30 TABLET ORAL 2 TIMES DAILY
Qty: 60 TABLET | Refills: 0 | Status: CANCELLED | OUTPATIENT
Start: 2023-03-03

## 2023-03-03 NOTE — TELEPHONE ENCOUNTER
Caller: Lissette Rea    Relationship: Self    Best call back number: [phone]    What is the best time to reach you: ANYTIME     Who are you requesting to speak with (clinical staff, provider,  specific staff member): CLINICAL     What was the call regarding: PATIENT CALLING TO FOLLOW UP ON THE PRIOR AUTH FOR amphetamine-dextroamphetamine (Adderall) 30 MG tablet    Do you require a callback: YES

## 2023-03-20 ENCOUNTER — PRIOR AUTHORIZATION (OUTPATIENT)
Dept: FAMILY MEDICINE CLINIC | Facility: CLINIC | Age: 48
End: 2023-03-20
Payer: COMMERCIAL

## 2023-03-20 NOTE — TELEPHONE ENCOUNTER
PA for Amphetamine-Dextroamphetamine 30mg. has been approved from 03/16/2023 to 03/16/2024. Thanks

## 2023-04-05 ENCOUNTER — APPOINTMENT (OUTPATIENT)
Dept: WOMENS IMAGING | Facility: HOSPITAL | Age: 48
End: 2023-04-05
Payer: COMMERCIAL

## 2023-04-05 PROCEDURE — 77067 SCR MAMMO BI INCL CAD: CPT | Performed by: RADIOLOGY

## 2023-04-05 PROCEDURE — 77063 BREAST TOMOSYNTHESIS BI: CPT | Performed by: RADIOLOGY

## 2023-04-12 RX ORDER — AMLODIPINE BESYLATE 5 MG/1
TABLET ORAL
Qty: 90 TABLET | Refills: 1 | Status: SHIPPED | OUTPATIENT
Start: 2023-04-12

## 2023-04-21 ENCOUNTER — OFFICE VISIT (OUTPATIENT)
Dept: FAMILY MEDICINE CLINIC | Facility: CLINIC | Age: 48
End: 2023-04-21
Payer: COMMERCIAL

## 2023-04-21 VITALS
WEIGHT: 148 LBS | BODY MASS INDEX: 27.23 KG/M2 | TEMPERATURE: 97.8 F | DIASTOLIC BLOOD PRESSURE: 82 MMHG | OXYGEN SATURATION: 99 % | HEIGHT: 62 IN | HEART RATE: 97 BPM | SYSTOLIC BLOOD PRESSURE: 120 MMHG | RESPIRATION RATE: 16 BRPM

## 2023-04-21 DIAGNOSIS — F90.0 ATTENTION DEFICIT HYPERACTIVITY DISORDER (ADHD), PREDOMINANTLY INATTENTIVE TYPE: ICD-10-CM

## 2023-04-21 DIAGNOSIS — H53.9 IMPAIRED VISUAL PERCEPTION: ICD-10-CM

## 2023-04-21 DIAGNOSIS — I10 ESSENTIAL HYPERTENSION: Primary | ICD-10-CM

## 2023-04-21 DIAGNOSIS — F33.41 RECURRENT MAJOR DEPRESSIVE DISORDER, IN PARTIAL REMISSION: ICD-10-CM

## 2023-04-21 DIAGNOSIS — F41.1 GENERALIZED ANXIETY DISORDER: ICD-10-CM

## 2023-04-21 PROCEDURE — 99214 OFFICE O/P EST MOD 30 MIN: CPT | Performed by: PHYSICIAN ASSISTANT

## 2023-04-21 RX ORDER — MONTELUKAST SODIUM 10 MG/1
10 TABLET ORAL NIGHTLY
Qty: 90 TABLET | Refills: 3 | Status: SHIPPED | OUTPATIENT
Start: 2023-04-21

## 2023-04-21 RX ORDER — VILAZODONE HYDROCHLORIDE 10 MG/1
10 TABLET ORAL DAILY
Qty: 90 TABLET | Refills: 1 | Status: SHIPPED | OUTPATIENT
Start: 2023-04-21

## 2023-04-21 RX ORDER — ALBUTEROL SULFATE 2.5 MG/3ML
2.5 SOLUTION RESPIRATORY (INHALATION) EVERY 4 HOURS PRN
Qty: 120 EACH | Refills: 3 | Status: SHIPPED | OUTPATIENT
Start: 2023-04-21

## 2023-04-21 RX ORDER — DEXTROAMPHETAMINE SACCHARATE, AMPHETAMINE ASPARTATE, DEXTROAMPHETAMINE SULFATE AND AMPHETAMINE SULFATE 7.5; 7.5; 7.5; 7.5 MG/1; MG/1; MG/1; MG/1
30 TABLET ORAL 2 TIMES DAILY
Qty: 60 TABLET | Refills: 0 | Status: SHIPPED | OUTPATIENT
Start: 2023-04-21

## 2023-04-21 RX ORDER — ALBUTEROL SULFATE 90 UG/1
2 AEROSOL, METERED RESPIRATORY (INHALATION) EVERY 4 HOURS PRN
Qty: 18 G | Refills: 3 | Status: SHIPPED | OUTPATIENT
Start: 2023-04-21

## 2023-04-21 RX ORDER — BUDESONIDE AND FORMOTEROL FUMARATE DIHYDRATE 160; 4.5 UG/1; UG/1
2 AEROSOL RESPIRATORY (INHALATION)
Qty: 30.6 EACH | Refills: 3 | Status: SHIPPED | OUTPATIENT
Start: 2023-04-21

## 2023-04-21 NOTE — PATIENT INSTRUCTIONS
"Hypertension, Adult  High blood pressure (hypertension) is when the force of blood pumping through the arteries is too strong. The arteries are the blood vessels that carry blood from the heart throughout the body. Hypertension forces the heart to work harder to pump blood and may cause arteries to become narrow or stiff. Untreated or uncontrolled hypertension can cause a heart attack, heart failure, a stroke, kidney disease, and other problems.  A blood pressure reading consists of a higher number over a lower number. Ideally, your blood pressure should be below 120/80. The first (\"top\") number is called the systolic pressure. It is a measure of the pressure in your arteries as your heart beats. The second (\"bottom\") number is called the diastolic pressure. It is a measure of the pressure in your arteries as the heart relaxes.  What are the causes?  The exact cause of this condition is not known. There are some conditions that result in or are related to high blood pressure.  What increases the risk?  Some risk factors for high blood pressure are under your control. The following factors may make you more likely to develop this condition:  Smoking.  Having type 2 diabetes mellitus, high cholesterol, or both.  Not getting enough exercise or physical activity.  Being overweight.  Having too much fat, sugar, calories, or salt (sodium) in your diet.  Drinking too much alcohol.  Some risk factors for high blood pressure may be difficult or impossible to change. Some of these factors include:  Having chronic kidney disease.  Having a family history of high blood pressure.  Age. Risk increases with age.  Race. You may be at higher risk if you are .  Gender. Men are at higher risk than women before age 45. After age 65, women are at higher risk than men.  Having obstructive sleep apnea.  Stress.  What are the signs or symptoms?  High blood pressure may not cause symptoms. Very high blood pressure " (hypertensive crisis) may cause:  Headache.  Anxiety.  Shortness of breath.  Nosebleed.  Nausea and vomiting.  Vision changes.  Severe chest pain.  Seizures.  How is this diagnosed?  This condition is diagnosed by measuring your blood pressure while you are seated, with your arm resting on a flat surface, your legs uncrossed, and your feet flat on the floor. The cuff of the blood pressure monitor will be placed directly against the skin of your upper arm at the level of your heart. It should be measured at least twice using the same arm. Certain conditions can cause a difference in blood pressure between your right and left arms.  Certain factors can cause blood pressure readings to be lower or higher than normal for a short period of time:  When your blood pressure is higher when you are in a health care provider's office than when you are at home, this is called white coat hypertension. Most people with this condition do not need medicines.  When your blood pressure is higher at home than when you are in a health care provider's office, this is called masked hypertension. Most people with this condition may need medicines to control blood pressure.  If you have a high blood pressure reading during one visit or you have normal blood pressure with other risk factors, you may be asked to:  Return on a different day to have your blood pressure checked again.  Monitor your blood pressure at home for 1 week or longer.  If you are diagnosed with hypertension, you may have other blood or imaging tests to help your health care provider understand your overall risk for other conditions.  How is this treated?  This condition is treated by making healthy lifestyle changes, such as eating healthy foods, exercising more, and reducing your alcohol intake. Your health care provider may prescribe medicine if lifestyle changes are not enough to get your blood pressure under control, and if:  Your systolic blood pressure is above  130.  Your diastolic blood pressure is above 80.  Your personal target blood pressure may vary depending on your medical conditions, your age, and other factors.  Follow these instructions at home:  Eating and drinking    Eat a diet that is high in fiber and potassium, and low in sodium, added sugar, and fat. An example eating plan is called the DASH (Dietary Approaches to Stop Hypertension) diet. To eat this way:  Eat plenty of fresh fruits and vegetables. Try to fill one half of your plate at each meal with fruits and vegetables.  Eat whole grains, such as whole-wheat pasta, brown rice, or whole-grain bread. Fill about one fourth of your plate with whole grains.  Eat or drink low-fat dairy products, such as skim milk or low-fat yogurt.  Avoid fatty cuts of meat, processed or cured meats, and poultry with skin. Fill about one fourth of your plate with lean proteins, such as fish, chicken without skin, beans, eggs, or tofu.  Avoid pre-made and processed foods. These tend to be higher in sodium, added sugar, and fat.  Reduce your daily sodium intake. Most people with hypertension should eat less than 1,500 mg of sodium a day.  Do not drink alcohol if:  Your health care provider tells you not to drink.  You are pregnant, may be pregnant, or are planning to become pregnant.  If you drink alcohol:  Limit how much you use to:  0-1 drink a day for women.  0-2 drinks a day for men.  Be aware of how much alcohol is in your drink. In the [place] one drink equals one 12 oz bottle of beer (355 mL), one 5 oz glass of wine (148 mL), or one 1½ oz glass of hard liquor (44 mL).  Lifestyle    Work with your health care provider to maintain a healthy body weight or to lose weight. Ask what an ideal weight is for you.  Get at least 30 minutes of exercise most days of the week. Activities may include walking, swimming, or biking.  Include exercise to strengthen your muscles (resistance exercise), such as Pilates or lifting weights, as  part of your weekly exercise routine. Try to do these types of exercises for 30 minutes at least 3 days a week.  Do not use any products that contain nicotine or tobacco, such as cigarettes, e-cigarettes, and chewing tobacco. If you need help quitting, ask your health care provider.  Monitor your blood pressure at home as told by your health care provider.  Keep all follow-up visits as told by your health care provider. This is important.  Medicines  Take over-the-counter and prescription medicines only as told by your health care provider. Follow directions carefully. Blood pressure medicines must be taken as prescribed.  Do not skip doses of blood pressure medicine. Doing this puts you at risk for problems and can make the medicine less effective.  Ask your health care provider about side effects or reactions to medicines that you should watch for.  Contact a health care provider if you:  Think you are having a reaction to a medicine you are taking.  Have headaches that keep coming back (recurring).  Feel dizzy.  Have swelling in your ankles.  Have trouble with your vision.  Get help right away if you:  Develop a severe headache or confusion.  Have unusual weakness or numbness.  Feel faint.  Have severe pain in your chest or abdomen.  Vomit repeatedly.  Have trouble breathing.  Summary  Hypertension is when the force of blood pumping through your arteries is too strong. If this condition is not controlled, it may put you at risk for serious complications.  Your personal target blood pressure may vary depending on your medical conditions, your age, and other factors. For most people, a normal blood pressure is less than 120/80.  Hypertension is treated with lifestyle changes, medicines, or a combination of both. Lifestyle changes include losing weight, eating a healthy, low-sodium diet, exercising more, and limiting alcohol.  This information is not intended to replace advice given to you by your health care  provider. Make sure you discuss any questions you have with your health care provider.  Document Revised: 08/28/2019 Document Reviewed: 08/28/2019  [employer] Patient Education © 2022 [employer]  Generalized Anxiety Disorder, Adult  Generalized anxiety disorder (JEANETTE) is a mental health condition. Unlike normal worries, anxiety related to JEANETTE is not triggered by a specific event. These worries do not fade or get better with time. JEANETTE interferes with relationships, work, and school.  JEANETTE symptoms can vary from mild to severe. People with severe JEANETTE can have intense waves of anxiety with physical symptoms that are similar to panic attacks.  What are the causes?  The exact cause of JEANETTE is not known, but the following are believed to have an impact:  Differences in natural brain chemicals.  Genes passed down from parents to children.  Differences in the way threats are perceived.  Development and stress during childhood.  Personality.  What increases the risk?  The following factors may make you more likely to develop this condition:  Being female.  Having a family history of anxiety disorders.  Being very shy.  Experiencing very stressful life events, such as the death of a loved one.  Having a very stressful family environment.  What are the signs or symptoms?  People with JEANETTE often worry excessively about many things in their lives, such as their health and family. Symptoms may also include:  Mental and emotional symptoms:  Worrying excessively about natural disasters.  Fear of being late.  Difficulty concentrating.  Fears that others are judging your performance.  Physical symptoms:  Fatigue.  Headaches, muscle tension, muscle twitches, trembling, or feeling shaky.  Feeling like your heart is pounding or beating very fast.  Feeling out of breath or like you cannot take a deep breath.  Having trouble falling asleep or staying asleep, or experiencing restlessness.  Sweating.  Nausea, diarrhea, or irritable bowel  syndrome (IBS).  Behavioral symptoms:  Experiencing erratic moods or irritability.  Avoidance of new situations.  Avoidance of people.  Extreme difficulty making decisions.  How is this diagnosed?  This condition is diagnosed based on your symptoms and medical history. You will also have a physical exam. Your health care provider may perform tests to rule out other possible causes of your symptoms.  To be diagnosed with JEANETTE, a person must have anxiety that:  Is out of his or her control.  Affects several different aspects of his or her life, such as work and relationships.  Causes distress that makes him or her unable to take part in normal activities.  Includes at least three symptoms of JEANETTE, such as restlessness, fatigue, trouble concentrating, irritability, muscle tension, or sleep problems.  Before your health care provider can confirm a diagnosis of JEANETTE, these symptoms must be present more days than they are not, and they must last for 6 months or longer.  How is this treated?  This condition may be treated with:  Medicine. Antidepressant medicine is usually prescribed for long-term daily control. Anti-anxiety medicines may be added in severe cases, especially when panic attacks occur.  Talk therapy (psychotherapy). Certain types of talk therapy can be helpful in treating JEANETTE by providing support, education, and guidance. Options include:  Cognitive behavioral therapy (CBT). People learn coping skills and self-calming techniques to ease their physical symptoms. They learn to identify unrealistic thoughts and behaviors and to replace them with more appropriate thoughts and behaviors.  Acceptance and commitment therapy (ACT). This treatment teaches people how to be mindful as a way to cope with unwanted thoughts and feelings.  Biofeedback. This process trains you to manage your body's response (physiological response) through breathing techniques and relaxation methods. You will work with a therapist while  machines are used to monitor your physical symptoms.  Stress management techniques. These include yoga, meditation, and exercise.  A mental health specialist can help determine which treatment is best for you. Some people see improvement with one type of therapy. However, other people require a combination of therapies.  Follow these instructions at home:  Lifestyle  Maintain a consistent routine and schedule.  Anticipate stressful situations. Create a plan and allow extra time to work with your plan.  Practice stress management or self-calming techniques that you have learned from your therapist or your health care provider.  Exercise regularly and spend time outdoors.  Eat a healthy diet that includes plenty of vegetables, fruits, whole grains, low-fat dairy products, and lean protein.  Do not eat a lot of foods that are high in fat, added sugar, or salt (sodium).  Drink plenty of water.  Avoid alcohol. Alcohol can increase anxiety.  Avoid caffeine and certain over-the-counter cold medicines. These may make you feel worse. Ask your pharmacist which medicines to avoid.  General instructions  Take over-the-counter and prescription medicines only as told by your health care provider.  Understand that you are likely to have setbacks. Accept this and be kind to yourself as you persist to take better care of yourself.  Anticipate stressful situations. Create a plan and allow extra time to work with your plan.  Recognize and accept your accomplishments, even if you  them as small.  Spend time with people who care about you.  Keep all follow-up visits. This is important.  Where to find more information  National [place] of Mental Health: www.nimh.nih.gov  Substance Abuse and Mental Health Services: www.samhsa.gov  Contact a health care provider if:  Your symptoms do not get better.  Your symptoms get worse.  You have signs of depression, such as:  A persistently sad or irritable mood.  Loss of enjoyment in  activities that used to bring you bryn.  Change in weight or eating.  Changes in sleeping habits.  Get help right away if:  You have thoughts about hurting yourself or others.  If you ever feel like you may hurt yourself or others, or have thoughts about taking your own life, get help right away. Go to your nearest emergency department or:  Call your local emergency services (151 in the [place]  Call a suicide crisis helpline, such as the National Suicide Prevention Lifeline at 1-456.765.8153 or 132 in the [place] This is open 24 hours a day in the [place]  Text the Crisis Text Line at 710367 (in the [place]  Summary  Generalized anxiety disorder (JEANETTE) is a mental health condition that involves worry that is not triggered by a specific event.  People with JEANETTE often worry excessively about many things in their lives, such as their health and family.  JEANETTE may cause symptoms such as restlessness, trouble concentrating, sleep problems, frequent sweating, nausea, diarrhea, headaches, and trembling or muscle twitching.  A mental health specialist can help determine which treatment is best for you. Some people see improvement with one type of therapy. However, other people require a combination of therapies.  This information is not intended to replace advice given to you by your health care provider. Make sure you discuss any questions you have with your health care provider.  Document Revised: 07/13/2022 Document Reviewed: 04/10/2022  [employer] Patient Education © 2022 [employer]

## 2023-04-21 NOTE — PROGRESS NOTES
"Subjective   Lissette Rea is a 48 y.o. female.     History of Present Illness    Since the last visit, she has overall felt anxious.  She has Primary Hypertension and well controlled on current medication, Seasonal allergies and doing well on their medication , Asthma well controlled and not requiring rescue Albuterol > 2 times a week and Vitamin D deficiency and will update labs for continued management.  she has been compliant with current medications have reviewed them.  The patient denies medication side effects.  Will refill medications. /82   Pulse 97   Temp 97.8 °F (36.6 °C) (Skin)   Resp 16   Ht 157.5 cm (62.01\")   Wt 67.1 kg (148 lb)   LMP  (LMP Unknown)   SpO2 99%   BMI 27.06 kg/m²   Asthma has been a little bit worse in the last few days with pollen count will send Medrol Dosepak with refill to pharmacy for exacerbation if need.  Results for orders placed or performed in visit on 05/17/22   Comprehensive metabolic panel    Specimen: Blood   Result Value Ref Range    Glucose 86 65 - 99 mg/dL    BUN 13 6 - 24 mg/dL    Creatinine 0.95 0.57 - 1.00 mg/dL    EGFR Result 74 >59 mL/min/1.73    BUN/Creatinine Ratio 14 9 - 23    Sodium 140 134 - 144 mmol/L    Potassium 4.4 3.5 - 5.2 mmol/L    Chloride 102 96 - 106 mmol/L    Total CO2 25 20 - 29 mmol/L    Calcium 9.2 8.7 - 10.2 mg/dL    Total Protein 6.7 6.0 - 8.5 g/dL    Albumin 4.6 3.8 - 4.8 g/dL    Globulin 2.1 1.5 - 4.5 g/dL    A/G Ratio 2.2 1.2 - 2.2    Total Bilirubin 0.4 0.0 - 1.2 mg/dL    Alkaline Phosphatase 63 44 - 121 IU/L    AST (SGOT) 17 0 - 40 IU/L    ALT (SGPT) 16 0 - 32 IU/L   Lipid panel    Specimen: Blood   Result Value Ref Range    Total Cholesterol 170 100 - 199 mg/dL    Triglycerides 44 0 - 149 mg/dL    HDL Cholesterol 90 >39 mg/dL    VLDL Cholesterol Jose R 9 5 - 40 mg/dL    LDL Chol Calc (NIH) 71 0 - 99 mg/dL   TSH    Specimen: Blood   Result Value Ref Range    TSH 2.200 0.450 - 4.500 uIU/mL   Hemoglobin A1c    Specimen: Blood "   Result Value Ref Range    Hemoglobin A1C 5.1 4.8 - 5.6 %   Magnesium    Specimen: Blood   Result Value Ref Range    Magnesium 2.2 1.6 - 2.3 mg/dL   T3, Free    Specimen: Blood   Result Value Ref Range    T3, Free 2.9 2.0 - 4.4 pg/mL   T4, Free    Specimen: Blood   Result Value Ref Range    Free T4 1.16 0.82 - 1.77 ng/dL   Vitamin B12    Specimen: Blood   Result Value Ref Range    Vitamin B-12 613 232 - 1,245 pg/mL   Folate    Specimen: Blood   Result Value Ref Range    Folate 10.4 >3.0 ng/mL   Vitamin D 25 Hydroxy    Specimen: Blood   Result Value Ref Range    25 Hydroxy, Vitamin D 32.5 30.0 - 100.0 ng/mL   CBC and Differential    Specimen: Blood   Result Value Ref Range    WBC 5.3 3.4 - 10.8 x10E3/uL    RBC 4.28 3.77 - 5.28 x10E6/uL    Hemoglobin 13.2 11.1 - 15.9 g/dL    Hematocrit 39.6 34.0 - 46.6 %    MCV 93 79 - 97 fL    MCH 30.8 26.6 - 33.0 pg    MCHC 33.3 31.5 - 35.7 g/dL    RDW 12.5 11.7 - 15.4 %    Platelets 313 150 - 450 x10E3/uL    Neutrophil Rel % 76 Not Estab. %    Lymphocyte Rel % 13 Not Estab. %    Monocyte Rel % 7 Not Estab. %    Eosinophil Rel % 3 Not Estab. %    Basophil Rel % 1 Not Estab. %    Neutrophils Absolute 4.0 1.4 - 7.0 x10E3/uL    Lymphocytes Absolute 0.7 0.7 - 3.1 x10E3/uL    Monocytes Absolute 0.4 0.1 - 0.9 x10E3/uL    Eosinophils Absolute 0.2 0.0 - 0.4 x10E3/uL    Basophils Absolute 0.1 0.0 - 0.2 x10E3/uL    Immature Granulocyte Rel % 0 Not Estab. %    Immature Grans Absolute 0.0 0.0 - 0.1 x10E3/uL   Continues to take Wellbutrin for depression working well at 300 mg.  Concern of new stress at home her grandson is living with her.... Slowly adjusting.  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  Has had more daily anxiety and not able to exercise due to being a caregiver.  I do want her to consider a trial of Viibryd noting Zoloft caused weight gain and stopped taking.    Note she is taking her asthma and allergy meds and  has stopped taking the Nexium for GERD..  We will have her restart if need.  Continues on Detrol through her GYN for bladder control  Note to avoid ACE ARB's due to angioedema reaction  Patient has periodic back pain has had 2 prior lumbar surgeries  Last labs 8/19/2022 with goals met.  Labs due again in August.  Sees GYN    Lissette ELSA Rea 48 y.o. female who presents for ADD/ADHD follow up. They are well controlled on their current Rx.  No new problems with insomnia, tachycardia, chest pain, or weight loss.  She continues to work hard at diet and exercise and slowly reducing her weight. the patient has read and signed the [facility] Controlled Substance Contract.  I will continue to see patient for regular follow up appointments and give the lowest effective dose.  They are well controlled on their medication at the lowest effective dose.  She has a previous history of a diagnosis of  ADD/ADHD.   ASIF has been reviewed by me and is updated every 3 months. The patient is aware of the potential for addiction and dependence.  The Rx continues to help them concentrate, finish tasks in timely manor, and succeed.  She denies current suicidal and homicidal ideation.  Reviewed causes for potential of discontinuation of stimulant medication,  including but not limited to consideration for diversion, obtaining other controlled substances from other license practitioners, or  inappropriate office behavior.  Patient understands to use a controlled substance as prescribed by physician and to avoid improper use of controlled substances.  Patient will also verbalized understanding that prescriptions to be filled at the same pharmacy  unless office staff is made aware of this.  Patient understands they can be submitted to random urine drug screens and/or random pill counts on any request.  Patient understands they are not to receive early refills.  The patient must produce an official police report for any effort to  replace controlled substances that are lost or stolen.  No use of illegal street drugs while receiving controlled substances from this prescribing provider.  The patient is to take medication as prescribed  and not deviate from the normal schedule without consultation from the provider.  Patient is not to share the medication with others or to take medication with alcohol or other sedatives.  Use caution when driving or operating machinery.  Must always keep the prescription in the original container.  Patient is to store controlled substances in a locked cabinet or other secure storage unit that is cool, dry and out of sunlight.  Patient must immediately notify office if this controlled substances is  improperly taken by another individual.  Reviewed risk for physical dependence, tolerance and addiction.    There is no evidence of aberrant behavior or any red flags.  .     7/1/2015 [facility] neuropsych evaluation.      The following portions of the patient's history were reviewed and updated as appropriate: allergies, current medications, past family history, past medical history, past social history, past surgical history and problem list.    Review of Systems   Constitutional: Negative for fever.   HENT: Negative for nosebleeds and trouble swallowing.    Eyes: Negative for visual disturbance.   Respiratory: Negative for choking and stridor.    Cardiovascular: Negative for chest pain.   Gastrointestinal: Negative for blood in stool.   Endocrine: Negative for polydipsia.   Genitourinary: Negative for genital sores and hematuria.   Musculoskeletal: Negative for joint swelling.   Skin: Negative for color change and rash.   Allergic/Immunologic: Negative for immunocompromised state.   Neurological: Negative for seizures, facial asymmetry and speech difficulty.   Hematological: Negative for adenopathy.   Psychiatric/Behavioral: Negative for behavioral problems, dysphoric mood, self-injury and suicidal  ideas. The patient is nervous/anxious.        Objective   Physical Exam  Vitals and nursing note reviewed.   Constitutional:       General: She is not in acute distress.     Appearance: She is well-developed. She is not ill-appearing or toxic-appearing.   HENT:      Head: Normocephalic.      Right Ear: External ear normal.      Left Ear: External ear normal.      Nose: Nose normal.      Mouth/Throat:      Pharynx: Oropharynx is clear.   Eyes:      General: No scleral icterus.     Conjunctiva/sclera: Conjunctivae normal.      Pupils: Pupils are equal, round, and reactive to light.   Neck:      Thyroid: No thyromegaly.   Cardiovascular:      Rate and Rhythm: Normal rate and regular rhythm.      Heart sounds: Normal heart sounds. No murmur heard.  Pulmonary:      Effort: Pulmonary effort is normal. No respiratory distress.      Breath sounds: Normal breath sounds.   Musculoskeletal:         General: No deformity. Normal range of motion.      Cervical back: Normal range of motion and neck supple.   Skin:     General: Skin is warm and dry.      Findings: No rash.   Neurological:      General: No focal deficit present.      Mental Status: She is alert and oriented to person, place, and time. Mental status is at baseline.   Psychiatric:         Mood and Affect: Mood normal.         Behavior: Behavior normal.         Thought Content: Thought content normal.         Judgment: Judgment normal.         Assessment & Plan   Diagnoses and all orders for this visit:    1. Essential hypertension (Primary)    2. Impaired visual perception    3. Attention deficit hyperactivity disorder (ADHD), predominantly inattentive type    4. Generalized anxiety disorder    5. Recurrent major depressive disorder, in partial remission    Other orders  -     montelukast (SINGULAIR) 10 MG tablet; Take 1 tablet by mouth Every Night. For asthma  Dispense: 90 tablet; Refill: 3  -     budesonide-formoterol (Symbicort) 160-4.5 MCG/ACT inhaler; Inhale 2  puffs 2 (Two) Times a Day. For asthma and rinse mouth after use  Dispense: 30.6 each; Refill: 3  -     albuterol (PROVENTIL) (2.5 MG/3ML) 0.083% nebulizer solution; Take 2.5 mg by nebulization Every 4 (Four) Hours As Needed for Wheezing.  Dispense: 120 each; Refill: 3  -     albuterol sulfate  (90 Base) MCG/ACT inhaler; Inhale 2 puffs Every 4 (Four) Hours As Needed for Wheezing.  Dispense: 18 g; Refill: 3        Plan, Lissette HUNTER Boogiethomas, was seen today.  she was seen for HTN and continue medication, Seasonal allergies and is doing well on their medication PRN, Asthma and is well controlled on medication.   and Vitamin D deficiency and supplemented.  Continue Adderall for ADD working well  Labs Aug  Will continue Wellbutrin for depression and want her to consider starting Viibryd for anxiety---ins covers---she is anxious      Answers for HPI/ROS submitted by the patient on 4/20/2023  Please describe your symptoms.: 3 month follow up ADD  Have you had these symptoms before?: Yes  How long have you been having these symptoms?: Greater than 2 weeks  What is the primary reason for your visit?: Other

## 2023-05-11 RX ORDER — METHYLPREDNISOLONE 4 MG/1
TABLET ORAL
Qty: 21 TABLET | Refills: 11 | Status: SHIPPED | OUTPATIENT
Start: 2023-05-11

## 2023-05-22 RX ORDER — DEXTROAMPHETAMINE SACCHARATE, AMPHETAMINE ASPARTATE, DEXTROAMPHETAMINE SULFATE AND AMPHETAMINE SULFATE 7.5; 7.5; 7.5; 7.5 MG/1; MG/1; MG/1; MG/1
30 TABLET ORAL 2 TIMES DAILY
Qty: 60 TABLET | Refills: 0 | Status: SHIPPED | OUTPATIENT
Start: 2023-05-22

## 2023-05-22 NOTE — TELEPHONE ENCOUNTER
----- Message from Lissette Rea sent at 5/22/2023  7:10 AM EDT -----  Regarding: Adderall   Contact: [phone]  Good morning,  I have 3 days of Adderall left can you send my prescription to [facility] when you have time.    Thank you

## 2023-08-24 ENCOUNTER — PATIENT MESSAGE (OUTPATIENT)
Dept: FAMILY MEDICINE CLINIC | Facility: CLINIC | Age: 48
End: 2023-08-24
Payer: COMMERCIAL

## 2023-08-24 NOTE — TELEPHONE ENCOUNTER
From: Lissette Rea  To: Trang Doherty  Sent: 8/24/2023 5:12 AM EDT  Subject: Adderall     Good morning,    I have 3 days of medicine left when you get a chance if you will send my prescription to [facility].     Thank you

## 2023-08-25 RX ORDER — DEXTROAMPHETAMINE SACCHARATE, AMPHETAMINE ASPARTATE, DEXTROAMPHETAMINE SULFATE AND AMPHETAMINE SULFATE 7.5; 7.5; 7.5; 7.5 MG/1; MG/1; MG/1; MG/1
30 TABLET ORAL 2 TIMES DAILY
Qty: 60 TABLET | Refills: 0 | Status: SHIPPED | OUTPATIENT
Start: 2023-08-25

## 2023-09-18 RX ORDER — BUPROPION HYDROCHLORIDE 300 MG/1
TABLET ORAL
Qty: 90 TABLET | Refills: 3 | Status: SHIPPED | OUTPATIENT
Start: 2023-09-18

## 2023-09-25 ENCOUNTER — PATIENT MESSAGE (OUTPATIENT)
Dept: FAMILY MEDICINE CLINIC | Facility: CLINIC | Age: 48
End: 2023-09-25

## 2023-09-25 RX ORDER — DEXTROAMPHETAMINE SACCHARATE, AMPHETAMINE ASPARTATE, DEXTROAMPHETAMINE SULFATE AND AMPHETAMINE SULFATE 7.5; 7.5; 7.5; 7.5 MG/1; MG/1; MG/1; MG/1
30 TABLET ORAL 2 TIMES DAILY
Qty: 60 TABLET | Refills: 0 | Status: SHIPPED | OUTPATIENT
Start: 2023-09-25

## 2023-09-25 NOTE — TELEPHONE ENCOUNTER
From: Lissette Rea  To: Trang Doherty  Sent: 9/25/2023 5:26 AM EDT  Subject: Adderall     Good morning,    I have 2 days of Adderall left can you send a prescription to [facility].

## 2023-10-06 ENCOUNTER — OFFICE VISIT (OUTPATIENT)
Dept: FAMILY MEDICINE CLINIC | Facility: CLINIC | Age: 48
End: 2023-10-06
Payer: COMMERCIAL

## 2023-10-06 VITALS
WEIGHT: 165.2 LBS | HEIGHT: 62 IN | SYSTOLIC BLOOD PRESSURE: 150 MMHG | OXYGEN SATURATION: 100 % | BODY MASS INDEX: 30.4 KG/M2 | DIASTOLIC BLOOD PRESSURE: 80 MMHG | HEART RATE: 102 BPM | RESPIRATION RATE: 16 BRPM | TEMPERATURE: 97.3 F

## 2023-10-06 DIAGNOSIS — I10 ESSENTIAL HYPERTENSION: Primary | ICD-10-CM

## 2023-10-06 DIAGNOSIS — J45.40 MODERATE PERSISTENT ASTHMA WITHOUT COMPLICATION: ICD-10-CM

## 2023-10-06 DIAGNOSIS — F41.1 GENERALIZED ANXIETY DISORDER: ICD-10-CM

## 2023-10-06 DIAGNOSIS — F90.0 ATTENTION DEFICIT HYPERACTIVITY DISORDER (ADHD), PREDOMINANTLY INATTENTIVE TYPE: ICD-10-CM

## 2023-10-06 DIAGNOSIS — F33.41 RECURRENT MAJOR DEPRESSIVE DISORDER, IN PARTIAL REMISSION: ICD-10-CM

## 2023-10-06 DIAGNOSIS — K21.9 GASTROESOPHAGEAL REFLUX DISEASE WITHOUT ESOPHAGITIS: ICD-10-CM

## 2023-10-06 PROCEDURE — 99214 OFFICE O/P EST MOD 30 MIN: CPT | Performed by: PHYSICIAN ASSISTANT

## 2023-10-06 RX ORDER — TRIAMTERENE AND HYDROCHLOROTHIAZIDE 37.5; 25 MG/1; MG/1
1 CAPSULE ORAL EVERY MORNING
Qty: 30 CAPSULE | Refills: 2 | Status: SHIPPED | OUTPATIENT
Start: 2023-10-06

## 2023-10-06 RX ORDER — ESOMEPRAZOLE MAGNESIUM 40 MG/1
40 CAPSULE, DELAYED RELEASE ORAL
COMMUNITY

## 2023-10-06 RX ORDER — AMLODIPINE BESYLATE 10 MG/1
10 TABLET ORAL DAILY
Qty: 90 TABLET | Refills: 1 | Status: SHIPPED | OUTPATIENT
Start: 2023-10-06

## 2023-10-06 RX ORDER — DEXTROAMPHETAMINE SACCHARATE, AMPHETAMINE ASPARTATE, DEXTROAMPHETAMINE SULFATE AND AMPHETAMINE SULFATE 7.5; 7.5; 7.5; 7.5 MG/1; MG/1; MG/1; MG/1
30 TABLET ORAL 2 TIMES DAILY
Qty: 60 TABLET | Refills: 0 | Status: SHIPPED | OUTPATIENT
Start: 2023-10-06

## 2023-10-06 RX ORDER — VILAZODONE HYDROCHLORIDE 40 MG/1
40 TABLET ORAL DAILY
Qty: 90 TABLET | Refills: 1 | Status: SHIPPED | OUTPATIENT
Start: 2023-10-06

## 2023-10-06 NOTE — PROGRESS NOTES
"Subjective   Lissette Rea is a 48 y.o. female.     History of Present Illness    Since the last visit, she has overall felt anxious.  She has Primary Hypertension not at goal, plan to add/adjust medication, Impaired fasting glucose and will monitor labs to watch for DMII, and Vitamin D deficiency and will update labs for continued management.  she has been compliant with current medications have reviewed them.  The patient denies medication side effects.  Will refill medications. /87 (BP Location: Left arm, Patient Position: Sitting)   Pulse 102   Temp 97.3 °F (36.3 °C) (Oral)   Resp 16   Ht 157.5 cm (62\")   Wt 74.9 kg (165 lb 3.2 oz)   LMP  (LMP Unknown)   SpO2 100%   BMI 30.22 kg/m²     Results for orders placed or performed in visit on 05/17/22   Comprehensive metabolic panel    Specimen: Blood   Result Value Ref Range    Glucose 86 65 - 99 mg/dL    BUN 13 6 - 24 mg/dL    Creatinine 0.95 0.57 - 1.00 mg/dL    EGFR Result 74 >59 mL/min/1.73    BUN/Creatinine Ratio 14 9 - 23    Sodium 140 134 - 144 mmol/L    Potassium 4.4 3.5 - 5.2 mmol/L    Chloride 102 96 - 106 mmol/L    Total CO2 25 20 - 29 mmol/L    Calcium 9.2 8.7 - 10.2 mg/dL    Total Protein 6.7 6.0 - 8.5 g/dL    Albumin 4.6 3.8 - 4.8 g/dL    Globulin 2.1 1.5 - 4.5 g/dL    A/G Ratio 2.2 1.2 - 2.2    Total Bilirubin 0.4 0.0 - 1.2 mg/dL    Alkaline Phosphatase 63 44 - 121 IU/L    AST (SGOT) 17 0 - 40 IU/L    ALT (SGPT) 16 0 - 32 IU/L   Lipid panel    Specimen: Blood   Result Value Ref Range    Total Cholesterol 170 100 - 199 mg/dL    Triglycerides 44 0 - 149 mg/dL    HDL Cholesterol 90 >39 mg/dL    VLDL Cholesterol Jose R 9 5 - 40 mg/dL    LDL Chol Calc (NIH) 71 0 - 99 mg/dL   TSH    Specimen: Blood   Result Value Ref Range    TSH 2.200 0.450 - 4.500 uIU/mL   Hemoglobin A1c    Specimen: Blood   Result Value Ref Range    Hemoglobin A1C 5.1 4.8 - 5.6 %   Magnesium    Specimen: Blood   Result Value Ref Range    Magnesium 2.2 1.6 - 2.3 mg/dL   T3, " "Free    Specimen: Blood   Result Value Ref Range    T3, Free 2.9 2.0 - 4.4 pg/mL   T4, Free    Specimen: Blood   Result Value Ref Range    Free T4 1.16 0.82 - 1.77 ng/dL   Vitamin B12    Specimen: Blood   Result Value Ref Range    Vitamin B-12 613 232 - 1,245 pg/mL   Folate    Specimen: Blood   Result Value Ref Range    Folate 10.4 >3.0 ng/mL   Vitamin D 25 Hydroxy    Specimen: Blood   Result Value Ref Range    25 Hydroxy, Vitamin D 32.5 30.0 - 100.0 ng/mL   CBC and Differential    Specimen: Blood   Result Value Ref Range    WBC 5.3 3.4 - 10.8 x10E3/uL    RBC 4.28 3.77 - 5.28 x10E6/uL    Hemoglobin 13.2 11.1 - 15.9 g/dL    Hematocrit 39.6 34.0 - 46.6 %    MCV 93 79 - 97 fL    MCH 30.8 26.6 - 33.0 pg    MCHC 33.3 31.5 - 35.7 g/dL    RDW 12.5 11.7 - 15.4 %    Platelets 313 150 - 450 x10E3/uL    Neutrophil Rel % 76 Not Estab. %    Lymphocyte Rel % 13 Not Estab. %    Monocyte Rel % 7 Not Estab. %    Eosinophil Rel % 3 Not Estab. %    Basophil Rel % 1 Not Estab. %    Neutrophils Absolute 4.0 1.4 - 7.0 x10E3/uL    Lymphocytes Absolute 0.7 0.7 - 3.1 x10E3/uL    Monocytes Absolute 0.4 0.1 - 0.9 x10E3/uL    Eosinophils Absolute 0.2 0.0 - 0.4 x10E3/uL    Basophils Absolute 0.1 0.0 - 0.2 x10E3/uL    Immature Granulocyte Rel % 0 Not Estab. %    Immature Grans Absolute 0.0 0.0 - 0.1 x10E3/uL         Lissette HUNTER Obanion female 48 y.o., /87 (BP Location: Left arm, Patient Position: Sitting)   Pulse 102   Temp 97.3 °F (36.3 °C) (Oral)   Resp 16   Ht 157.5 cm (62\")   Wt 74.9 kg (165 lb 3.2 oz)   LMP  (LMP Unknown)   SpO2 100%   BMI 30.22 kg/m²   who presents today for follow up of Depression and Anxiety.  She reports Viibryd is helped anxiety and depression and she continues to take the Wellbutrin.  Concerned that she is having breakthrough of both.  She is still very frustrated easily agitated and irritable.  Her weight is up unconcerned about her stress level she is exercising and sees a  also log her diet.  " Just very overwhelmed and some chronic low moods.. Onset of symptoms was approximately several months ago. She denies current suicidal and homicidal ideation. Risk factors are family history of anxiety and or depression, lifestyle of multiple roles, and family situation/stress.  Previous treatment includes current Rx. She complains of the following medication side effects:none. The patient has previously been in counseling..  Takes OTC Aleve prn migraine---works   The following portions of the patient's history were reviewed and updated as appropriate: allergies, current medications, past family history, past medical history, past social history, past surgical history, and problem list.  exercise 3-4 times a week elliptical   Does exeNote to avoid ACE ARB's due to angioedema reaction  Patient has periodic back pain has had 2 prior lumbar surgeries      7/1/2015 [facility] neuropsych evaluation.     Lissette Rea 48 y.o. female who presents for ADD/ADHD follow up. They are well controlled on their current Rx.  No new problems with insomnia, tachycardia, chest pain, or weight loss. The patient has read and signed the [facility] Controlled Substance Contract.  I will continue to see patient for regular follow up appointments and give the lowest effective dose.  They are well controlled on their medication at the lowest effective dose.  She has a previous history of a diagnosis of  ADD/ADHD.   ASIF has been reviewed by me and is updated every 3 months. The patient is aware of the potential for addiction and dependence.  The Rx continues to help them concentrate, finish tasks in timely manor, and succeed.  She denies current suicidal and homicidal ideation.  Reviewed causes for potential of discontinuation of stimulant medication,  including but not limited to consideration for diversion, obtaining other controlled substances from other license practitioners, or  inappropriate office behavior.   Patient understands to use a controlled substance as prescribed by physician and to avoid improper use of controlled substances.  Patient will also verbalized understanding that prescriptions to be filled at the same pharmacy  unless office staff is made aware of this.  Patient understands they can be submitted to random urine drug screens and/or random pill counts on any request.  Patient understands they are not to receive early refills.  The patient must produce an official police report for any effort to replace controlled substances that are lost or stolen.  No use of illegal street drugs while receiving controlled substances from this prescribing provider.  The patient is to take medication as prescribed  and not deviate from the normal schedule without consultation from the provider.  Patient is not to share the medication with others or to take medication with alcohol or other sedatives.  Use caution when driving or operating machinery.  Must always keep the prescription in the original container.  Patient is to store controlled substances in a locked cabinet or other secure storage unit that is cool, dry and out of sunlight.  Patient must immediately notify office if this controlled substances is  improperly taken by another individual.  Reviewed risk for physical dependence, tolerance and addiction.    There is no evidence of aberrant behavior or any red flags.  .         Review of Systems   Constitutional:  Positive for activity change, appetite change and fatigue. Negative for diaphoresis.   HENT:  Negative for nosebleeds and trouble swallowing.    Eyes:  Negative for blurred vision and visual disturbance.   Respiratory:  Positive for wheezing. Negative for choking.    Gastrointestinal:  Negative for blood in stool.   Allergic/Immunologic: Negative for immunocompromised state.   Neurological:  Negative for facial asymmetry and speech difficulty.   Psychiatric/Behavioral:  Positive for decreased  concentration and dysphoric mood. Negative for self-injury and suicidal ideas. The patient is nervous/anxious.      Objective   Physical Exam  Vitals and nursing note reviewed.   Constitutional:       General: She is not in acute distress.     Appearance: Normal appearance. She is well-developed. She is not ill-appearing or toxic-appearing.   HENT:      Head: Normocephalic.      Right Ear: External ear normal.      Left Ear: External ear normal.      Nose: Nose normal.      Mouth/Throat:      Pharynx: Oropharynx is clear.   Eyes:      General: No scleral icterus.     Conjunctiva/sclera: Conjunctivae normal.      Pupils: Pupils are equal, round, and reactive to light.   Neck:      Thyroid: No thyromegaly.   Cardiovascular:      Rate and Rhythm: Normal rate and regular rhythm.      Pulses: Normal pulses.      Heart sounds: Normal heart sounds. No murmur heard.  Pulmonary:      Effort: Pulmonary effort is normal. No respiratory distress.      Breath sounds: Normal breath sounds. No rales.   Musculoskeletal:         General: No deformity. Normal range of motion.      Cervical back: Normal range of motion and neck supple.      Right lower leg: No edema.      Left lower leg: No edema.   Skin:     General: Skin is warm and dry.      Findings: No rash.   Neurological:      General: No focal deficit present.      Mental Status: She is alert and oriented to person, place, and time. Mental status is at baseline.   Psychiatric:         Mood and Affect: Mood normal.         Behavior: Behavior normal.         Thought Content: Thought content normal.         Judgment: Judgment normal.         Assessment & Plan   Diagnoses and all orders for this visit:    1. Essential hypertension (Primary)    2. Attention deficit hyperactivity disorder (ADHD), predominantly inattentive type    3. Gastroesophageal reflux disease without esophagitis    4. Generalized anxiety disorder    5. Recurrent major depressive disorder, in partial  remission      Plan, Lissette HUNTER Boogiethomas, was seen today.  she was seen for HTN and add/adjust medication, Imparied fasting glucose and plan follow up labs, diet, and exercise, Asthma and is well controlled on medication.  , and Vitamin D deficiency and will update labs .  Concerned that her asthma has been worse in the last several weeks we will have her restart her generic Nexium daily--- concern is GERD could be silent and affecting her asthma  Blood pressure is not controlled will add Dyazide noting she has no sulfa allergy and no history of gout--plan to keep amlodipine at 10 mg for now  Had angioedema with ACE ARB  Continue Adderall for ADD works well  Concern with breakthrough anxiety and depression we will continue the Wellbutrin XL and increase Viibryd dose to 40 mg daily  Follow-up with me in a few weeks for blood pressure check     Answers submitted by the patient for this visit:  Other (Submitted on 10/3/2023)  Please describe your symptoms.: ADHD follow up.  Have you had these symptoms before?: Yes  How long have you been having these symptoms?: Greater than 2 weeks  Please list any medications you are currently taking for this condition.: Adderall  Primary Reason for Visit (Submitted on 10/3/2023)  What is the primary reason for your visit?: Other

## 2023-10-24 ENCOUNTER — OFFICE VISIT (OUTPATIENT)
Dept: FAMILY MEDICINE CLINIC | Facility: CLINIC | Age: 48
End: 2023-10-24
Payer: COMMERCIAL

## 2023-10-24 VITALS
RESPIRATION RATE: 16 BRPM | TEMPERATURE: 97.5 F | HEART RATE: 97 BPM | WEIGHT: 161 LBS | SYSTOLIC BLOOD PRESSURE: 130 MMHG | HEIGHT: 62 IN | BODY MASS INDEX: 29.63 KG/M2 | OXYGEN SATURATION: 100 % | DIASTOLIC BLOOD PRESSURE: 80 MMHG

## 2023-10-24 DIAGNOSIS — F33.42 RECURRENT MAJOR DEPRESSIVE DISORDER, IN FULL REMISSION: ICD-10-CM

## 2023-10-24 DIAGNOSIS — F41.1 GENERALIZED ANXIETY DISORDER: ICD-10-CM

## 2023-10-24 DIAGNOSIS — I10 PRIMARY HYPERTENSION: ICD-10-CM

## 2023-10-24 DIAGNOSIS — J01.90 ACUTE SINUSITIS, RECURRENCE NOT SPECIFIED, UNSPECIFIED LOCATION: ICD-10-CM

## 2023-10-24 DIAGNOSIS — F90.0 ATTENTION DEFICIT HYPERACTIVITY DISORDER (ADHD), PREDOMINANTLY INATTENTIVE TYPE: ICD-10-CM

## 2023-10-24 DIAGNOSIS — Z00.00 ROUTINE GENERAL MEDICAL EXAMINATION AT A HEALTH CARE FACILITY: Primary | ICD-10-CM

## 2023-10-24 DIAGNOSIS — E55.9 VITAMIN D DEFICIENCY: ICD-10-CM

## 2023-10-24 DIAGNOSIS — K21.9 GASTROESOPHAGEAL REFLUX DISEASE WITHOUT ESOPHAGITIS: ICD-10-CM

## 2023-10-24 DIAGNOSIS — J45.40 MODERATE PERSISTENT ASTHMA WITHOUT COMPLICATION: ICD-10-CM

## 2023-10-24 RX ORDER — AMOXICILLIN AND CLAVULANATE POTASSIUM 875; 125 MG/1; MG/1
1 TABLET, FILM COATED ORAL EVERY 12 HOURS SCHEDULED
Qty: 20 TABLET | Refills: 5 | Status: SHIPPED | OUTPATIENT
Start: 2023-10-24

## 2023-10-24 RX ORDER — TRIAMTERENE AND HYDROCHLOROTHIAZIDE 37.5; 25 MG/1; MG/1
1 CAPSULE ORAL EVERY MORNING
Qty: 90 CAPSULE | Refills: 1 | Status: SHIPPED | OUTPATIENT
Start: 2023-10-24

## 2023-10-24 RX ORDER — METHYLPREDNISOLONE 4 MG/1
TABLET ORAL
Qty: 21 TABLET | Refills: 5 | Status: SHIPPED | OUTPATIENT
Start: 2023-10-24

## 2023-10-24 NOTE — PROGRESS NOTES
"Subjective   Lissette Rea is a 48 y.o. female.     History of Present Illness    Since the last visit, she has overall felt fairly well.  She has  primary hypertension and today's visit is for prevention for her insurance but also following up on her primary hypertension noting I had to add Dyazide to her amlodipine last visit for blood pressure control.  She has not had any reaction or rash.  Blood pressure is now controlled .  she has been compliant with current medications have reviewed them.  The patient denies medication side effects.  Will refill medications. /80   Pulse 97   Temp 97.5 °F (36.4 °C)   Resp 16   Ht 157.5 cm (62\")   Wt 73 kg (161 lb)   LMP  (LMP Unknown)   SpO2 100%   BMI 29.45 kg/m²   Takes OTC Aleve prn migraine---works  Note to avoid ACE ARB's due to angioedema reaction   Results for orders placed or performed in visit on 07/19/23   Lipid panel    Specimen: Blood   Result Value Ref Range    Total Cholesterol 191 0 - 200 mg/dL    Triglycerides 54 0 - 150 mg/dL    HDL Cholesterol 85 (H) 40 - 60 mg/dL    VLDL Cholesterol Jose R 10 5 - 40 mg/dL    LDL Chol Calc (NIH) 96 0 - 100 mg/dL   TSH    Specimen: Blood   Result Value Ref Range    TSH 2.100 0.270 - 4.200 uIU/mL   Hemoglobin A1c    Specimen: Blood   Result Value Ref Range    Hemoglobin A1C 5.10 4.80 - 5.60 %   T4, Free    Specimen: Blood   Result Value Ref Range    Free T4 1.05 0.93 - 1.70 ng/dL   Vitamin B12    Specimen: Blood   Result Value Ref Range    Vitamin B-12 343 211 - 946 pg/mL   Folate    Specimen: Blood   Result Value Ref Range    Folate 9.09 4.78 - 24.20 ng/mL   Vitamin D,25-Hydroxy    Specimen: Blood   Result Value Ref Range    25 Hydroxy, Vitamin D 35.5 30.0 - 100.0 ng/ml   Magnesium    Specimen: Blood   Result Value Ref Range    Magnesium 2.4 1.6 - 2.6 mg/dL   FSH & LH    Specimen: Blood   Result Value Ref Range    LH 9.3 mIU/mL    FSH 12.1 mIU/mL   Comprehensive metabolic panel    Specimen: Blood   Result Value " Ref Range    Glucose 87 65 - 99 mg/dL    BUN 24 (H) 6 - 20 mg/dL    Creatinine 0.83 0.57 - 1.00 mg/dL    EGFR Result 86.5 >60.0 mL/min/1.73    BUN/Creatinine Ratio 28.9 (H) 7.0 - 25.0    Sodium 141 136 - 145 mmol/L    Potassium 4.7 3.5 - 5.2 mmol/L    Chloride 105 98 - 107 mmol/L    Total CO2 28.2 22.0 - 29.0 mmol/L    Calcium 9.2 8.6 - 10.5 mg/dL    Total Protein 6.8 6.0 - 8.5 g/dL    Albumin 4.4 3.5 - 5.2 g/dL    Globulin 2.4 gm/dL    A/G Ratio 1.8 g/dL    Total Bilirubin 0.3 0.0 - 1.2 mg/dL    Alkaline Phosphatase 66 39 - 117 U/L    AST (SGOT) 20 1 - 32 U/L    ALT (SGPT) 18 1 - 33 U/L   Microscopic Examination -   Result Value Ref Range    WBC, UA 0-2 /HPF    RBC, UA 0-2 /HPF    Epithelial Cells (non renal) 3-6 (A) /HPF    Cast Type Comment     Bacteria, UA Comment None Seen /HPF   CBC and Differential    Specimen: Blood   Result Value Ref Range    WBC 5.70 3.40 - 10.80 10*3/mm3    RBC 4.37 3.77 - 5.28 10*6/mm3    Hemoglobin 13.4 12.0 - 15.9 g/dL    Hematocrit 40.1 34.0 - 46.6 %    MCV 91.8 79.0 - 97.0 fL    MCH 30.7 26.6 - 33.0 pg    MCHC 33.4 31.5 - 35.7 g/dL    RDW 12.0 (L) 12.3 - 15.4 %    Platelets 375 140 - 450 10*3/mm3    Neutrophil Rel % 68.4 42.7 - 76.0 %    Lymphocyte Rel % 16.5 (L) 19.6 - 45.3 %    Monocyte Rel % 6.8 5.0 - 12.0 %    Eosinophil Rel % 7.2 (H) 0.3 - 6.2 %    Basophil Rel % 0.9 0.0 - 1.5 %    Neutrophils Absolute 3.90 1.70 - 7.00 10*3/mm3    Lymphocytes Absolute 0.94 0.70 - 3.10 10*3/mm3    Monocytes Absolute 0.39 0.10 - 0.90 10*3/mm3    Eosinophils Absolute 0.41 (H) 0.00 - 0.40 10*3/mm3    Basophils Absolute 0.05 0.00 - 0.20 10*3/mm3    Immature Granulocyte Rel % 0.2 0.0 - 0.5 %    Immature Grans Absolute 0.01 0.00 - 0.05 10*3/mm3    nRBC 0.0 0.0 - 0.2 /100 WBC   Urinalysis With Microscopic - Urine, Clean Catch    Specimen: Urine, Clean Catch   Result Value Ref Range    Specific Gravity, UA 1.016 1.005 - 1.030    pH, UA 6.5 5.0 - 8.0    Color, UA Yellow     Appearance, UA Clear Clear     "Leukocytes, UA Negative Negative    Protein Negative Negative    Glucose, UA Negative Negative    Ketones Negative Negative    Blood, UA Negative Negative    Bilirubin, UA Negative Negative    Urobilinogen, UA Comment     Nitrite, UA Negative Negative     The 10-year ASCVD risk score (Eitan HERNANDEZ, et al., 2019) is: 0.8%    Values used to calculate the score:      Age: 48 years      Sex: Female      Is Non- : No      Diabetic: No      Tobacco smoker: No      Systolic Blood Pressure: 130 mmHg      Is BP treated: Yes      HDL Cholesterol: 85 mg/dL      Total Cholesterol: 191 mg/dL    Doing work out 3 times a week---has . Walking, weight lifting, special diet.----weight about the same.  Up to date on dental and eye exam  Declines EKG  Lissette Rea female 48 y.o., /80   Pulse 97   Temp 97.5 °F (36.4 °C)   Resp 16   Ht 157.5 cm (62\")   Wt 73 kg (161 lb)   LMP  (LMP Unknown)   SpO2 100%   BMI 29.45 kg/m²   who presents today for follow up of Depression and Anxiety.  She reports her breakthrough anxiety and depression is responding well to the higher dose of Viibryd at 40 mg daily and continues on Wellbutrin XL... She was having breakthrough anxiety and depression and was frustrated last time I saw her in this regimen is really helped we will continue same dosing the she is on now.. Onset of symptoms was approximately several years ago. She denies current suicidal and homicidal ideation. Risk factors are family history of anxiety and or depression, lifestyle of multiple roles, and family situation/stress.  Previous treatment includes current Rx. She complains of the following medication side effects:none. The patient declines to go to counseling..  3-8-16 normal EKG  Takes Mg  The following portions of the patient's history were reviewed and updated as appropriate: allergies, current medications, past family history, past medical history, past social history, past surgical " history, and problem list.    Review of Systems   Constitutional:  Negative for diaphoresis.   HENT:  Negative for nosebleeds and trouble swallowing.    Eyes:  Negative for blurred vision and visual disturbance.   Respiratory:  Negative for choking.    Gastrointestinal:  Negative for blood in stool.   Allergic/Immunologic: Negative for immunocompromised state.   Neurological:  Negative for facial asymmetry and speech difficulty.   Psychiatric/Behavioral:  Negative for self-injury and suicidal ideas.        Objective   Physical Exam  Vitals and nursing note reviewed.   Constitutional:       General: She is not in acute distress.     Appearance: Normal appearance. She is well-developed. She is not ill-appearing or toxic-appearing.   HENT:      Head: Normocephalic.      Right Ear: Tympanic membrane, ear canal and external ear normal.      Left Ear: Tympanic membrane, ear canal and external ear normal.      Ears:      Comments: Scarring and ear fluid     Nose: Nose normal. Congestion present.      Mouth/Throat:      Pharynx: Oropharynx is clear. Posterior oropharyngeal erythema present.   Eyes:      General: No scleral icterus.     Conjunctiva/sclera: Conjunctivae normal.      Pupils: Pupils are equal, round, and reactive to light.   Neck:      Thyroid: No thyromegaly.      Vascular: No carotid bruit.   Cardiovascular:      Rate and Rhythm: Normal rate and regular rhythm.      Pulses: Normal pulses.      Heart sounds: Normal heart sounds. No murmur heard.  Pulmonary:      Effort: Pulmonary effort is normal. No respiratory distress.      Breath sounds: Normal breath sounds. No rales.   Abdominal:      General: Abdomen is flat. Bowel sounds are normal.      Palpations: Abdomen is soft. There is no mass.      Tenderness: There is no abdominal tenderness.   Musculoskeletal:         General: No deformity. Normal range of motion.      Cervical back: Normal range of motion and neck supple.      Right lower leg: No edema.       Left lower leg: No edema.   Lymphadenopathy:      Cervical: No cervical adenopathy.   Skin:     General: Skin is warm and dry.      Findings: No rash.   Neurological:      General: No focal deficit present.      Mental Status: She is alert and oriented to person, place, and time. Mental status is at baseline.      Cranial Nerves: No cranial nerve deficit.      Sensory: No sensory deficit.      Motor: No weakness.      Coordination: Coordination normal.      Gait: Gait normal.      Deep Tendon Reflexes: Reflexes normal.   Psychiatric:         Mood and Affect: Mood normal.         Behavior: Behavior normal.         Thought Content: Thought content normal.         Judgment: Judgment normal.           Assessment & Plan   Diagnoses and all orders for this visit:    1. Routine general medical examination at a health care facility (Primary)    2. Gastroesophageal reflux disease without esophagitis    3. Vitamin D deficiency    4. Primary hypertension    5. Attention deficit hyperactivity disorder (ADHD), predominantly inattentive type    6. Recurrent major depressive disorder, in full remission    7. Generalized anxiety disorder    8. Moderate persistent asthma without complication    9. Acute sinusitis, recurrence not specified, unspecified location    Other orders  -     amoxicillin-clavulanate (AUGMENTIN) 875-125 MG per tablet; Take 1 tablet by mouth Every 12 (Twelve) Hours. One PO BID for infection with food  Dispense: 20 tablet; Refill: 5  -     methylPREDNISolone (MEDROL) 4 MG dose pack; follow package directions  Dispense: 21 tablet; Refill: 5        Plan, Lissette Rea, was seen today.  she was seen for HTN and continue medication, GERD and will continue on PPI medication, and Vitamin D deficiency and supplemented.  New dose of Viibryd working well for anxiety and depression and continue Wellbutrin XL also working well  Continue Adderall for ADD working well  Low glycemic index diet  Exercise 30 minutes most  days of the week  Make sure you get results on any labs or tests we ordered today  We discussed medications and how to take them as prescribed  Sleep 6-8 hours each night if possible  If you have not signed up for [employer], please activate your code ASAP from your After Visit Summary today    LDL goal <100  LDL goal if heart disease <70  HDL goal >60  Triglyceride goal <150  BP goal =<130/80  Fasting glucose <100  She has some congestion today we will put Medrol Dosepak on file if asthma flares and sent Augmentin to the pharmacy  Noting blood pressure is now controlled on combination therapy of Dyazide and amlodipine for hypertension

## 2023-11-27 ENCOUNTER — PATIENT MESSAGE (OUTPATIENT)
Dept: FAMILY MEDICINE CLINIC | Facility: CLINIC | Age: 48
End: 2023-11-27
Payer: COMMERCIAL

## 2023-11-27 RX ORDER — DEXTROAMPHETAMINE SACCHARATE, AMPHETAMINE ASPARTATE, DEXTROAMPHETAMINE SULFATE AND AMPHETAMINE SULFATE 7.5; 7.5; 7.5; 7.5 MG/1; MG/1; MG/1; MG/1
30 TABLET ORAL 2 TIMES DAILY
Qty: 60 TABLET | Refills: 0 | Status: SHIPPED | OUTPATIENT
Start: 2023-11-27

## 2023-11-27 NOTE — TELEPHONE ENCOUNTER
From: Lissette Rea  To: Trang Doherty  Sent: 11/27/2023 5:19 AM EST  Subject: Adderall     Good morning,  Will you please send my Adderall prescription to [facility]. I’m on my last dose today.     Thank you

## 2023-12-26 ENCOUNTER — PATIENT MESSAGE (OUTPATIENT)
Dept: FAMILY MEDICINE CLINIC | Facility: CLINIC | Age: 48
End: 2023-12-26
Payer: COMMERCIAL

## 2023-12-26 RX ORDER — DEXTROAMPHETAMINE SACCHARATE, AMPHETAMINE ASPARTATE, DEXTROAMPHETAMINE SULFATE AND AMPHETAMINE SULFATE 7.5; 7.5; 7.5; 7.5 MG/1; MG/1; MG/1; MG/1
30 TABLET ORAL 2 TIMES DAILY
Qty: 60 TABLET | Refills: 0 | Status: SHIPPED | OUTPATIENT
Start: 2023-12-26

## 2023-12-26 NOTE — TELEPHONE ENCOUNTER
From: Lissette Rea  To: Trang Doherty  Sent: 12/26/2023 6:02 AM EST  Subject: Adderall     Good morning,    When you have a chance can you please send my [facility]derall prescription to [facility].     Thank you

## 2024-01-17 ENCOUNTER — OFFICE VISIT (OUTPATIENT)
Dept: FAMILY MEDICINE CLINIC | Facility: CLINIC | Age: 49
End: 2024-01-17
Payer: COMMERCIAL

## 2024-01-17 VITALS
HEART RATE: 82 BPM | RESPIRATION RATE: 16 BRPM | OXYGEN SATURATION: 100 % | WEIGHT: 158 LBS | DIASTOLIC BLOOD PRESSURE: 78 MMHG | SYSTOLIC BLOOD PRESSURE: 124 MMHG | TEMPERATURE: 97.4 F | BODY MASS INDEX: 29.08 KG/M2 | HEIGHT: 62 IN

## 2024-01-17 DIAGNOSIS — K21.9 GASTROESOPHAGEAL REFLUX DISEASE WITHOUT ESOPHAGITIS: ICD-10-CM

## 2024-01-17 DIAGNOSIS — J45.40 MODERATE PERSISTENT ASTHMA WITHOUT COMPLICATION: ICD-10-CM

## 2024-01-17 DIAGNOSIS — F33.42 RECURRENT MAJOR DEPRESSIVE DISORDER, IN FULL REMISSION: ICD-10-CM

## 2024-01-17 DIAGNOSIS — F98.8 ATTENTION DEFICIT DISORDER (ADD) WITHOUT HYPERACTIVITY: ICD-10-CM

## 2024-01-17 DIAGNOSIS — F41.1 GENERALIZED ANXIETY DISORDER: ICD-10-CM

## 2024-01-17 DIAGNOSIS — I10 ESSENTIAL HYPERTENSION: Primary | ICD-10-CM

## 2024-01-17 DIAGNOSIS — E55.9 VITAMIN D DEFICIENCY: ICD-10-CM

## 2024-01-17 PROCEDURE — 99214 OFFICE O/P EST MOD 30 MIN: CPT | Performed by: PHYSICIAN ASSISTANT

## 2024-01-17 NOTE — PROGRESS NOTES
"Subjective   Lissette Rea is a 48 y.o. female.     History of Present Illness    Since the last visit, she has overall felt well.  She has Primary Hypertension and well controlled on current medication, GERD controlled on PPI Rx, Asthma well controlled and not requiring rescue Albuterol > 2 times a week, and Vitamin D deficiency and labs are at goal >30 ng/mL.  she has been compliant with current medications have reviewed them.  The patient denies medication side effects.  Will refill medications. /72   Pulse 82   Temp 97.4 °F (36.3 °C)   Resp 16   Ht 157.5 cm (62\")   Wt 71.7 kg (158 lb)   LMP  (LMP Unknown)   SpO2 100%   BMI 28.90 kg/m²     Results for orders placed or performed in visit on 07/19/23   Lipid panel    Specimen: Blood   Result Value Ref Range    Total Cholesterol 191 0 - 200 mg/dL    Triglycerides 54 0 - 150 mg/dL    HDL Cholesterol 85 (H) 40 - 60 mg/dL    VLDL Cholesterol Jose R 10 5 - 40 mg/dL    LDL Chol Calc (NIH) 96 0 - 100 mg/dL   TSH    Specimen: Blood   Result Value Ref Range    TSH 2.100 0.270 - 4.200 uIU/mL   Hemoglobin A1c    Specimen: Blood   Result Value Ref Range    Hemoglobin A1C 5.10 4.80 - 5.60 %   T4, Free    Specimen: Blood   Result Value Ref Range    Free T4 1.05 0.93 - 1.70 ng/dL   Vitamin B12    Specimen: Blood   Result Value Ref Range    Vitamin B-12 343 211 - 946 pg/mL   Folate    Specimen: Blood   Result Value Ref Range    Folate 9.09 4.78 - 24.20 ng/mL   Vitamin D,25-Hydroxy    Specimen: Blood   Result Value Ref Range    25 Hydroxy, Vitamin D 35.5 30.0 - 100.0 ng/ml   Magnesium    Specimen: Blood   Result Value Ref Range    Magnesium 2.4 1.6 - 2.6 mg/dL   FSH & LH    Specimen: Blood   Result Value Ref Range    LH 9.3 mIU/mL    FSH 12.1 mIU/mL   Comprehensive metabolic panel    Specimen: Blood   Result Value Ref Range    Glucose 87 65 - 99 mg/dL    BUN 24 (H) 6 - 20 mg/dL    Creatinine 0.83 0.57 - 1.00 mg/dL    EGFR Result 86.5 >60.0 mL/min/1.73    " BUN/Creatinine Ratio 28.9 (H) 7.0 - 25.0    Sodium 141 136 - 145 mmol/L    Potassium 4.7 3.5 - 5.2 mmol/L    Chloride 105 98 - 107 mmol/L    Total CO2 28.2 22.0 - 29.0 mmol/L    Calcium 9.2 8.6 - 10.5 mg/dL    Total Protein 6.8 6.0 - 8.5 g/dL    Albumin 4.4 3.5 - 5.2 g/dL    Globulin 2.4 gm/dL    A/G Ratio 1.8 g/dL    Total Bilirubin 0.3 0.0 - 1.2 mg/dL    Alkaline Phosphatase 66 39 - 117 U/L    AST (SGOT) 20 1 - 32 U/L    ALT (SGPT) 18 1 - 33 U/L   Microscopic Examination -   Result Value Ref Range    WBC, UA 0-2 /HPF    RBC, UA 0-2 /HPF    Epithelial Cells (non renal) 3-6 (A) /HPF    Cast Type Comment     Bacteria, UA Comment None Seen /HPF   CBC and Differential    Specimen: Blood   Result Value Ref Range    WBC 5.70 3.40 - 10.80 10*3/mm3    RBC 4.37 3.77 - 5.28 10*6/mm3    Hemoglobin 13.4 12.0 - 15.9 g/dL    Hematocrit 40.1 34.0 - 46.6 %    MCV 91.8 79.0 - 97.0 fL    MCH 30.7 26.6 - 33.0 pg    MCHC 33.4 31.5 - 35.7 g/dL    RDW 12.0 (L) 12.3 - 15.4 %    Platelets 375 140 - 450 10*3/mm3    Neutrophil Rel % 68.4 42.7 - 76.0 %    Lymphocyte Rel % 16.5 (L) 19.6 - 45.3 %    Monocyte Rel % 6.8 5.0 - 12.0 %    Eosinophil Rel % 7.2 (H) 0.3 - 6.2 %    Basophil Rel % 0.9 0.0 - 1.5 %    Neutrophils Absolute 3.90 1.70 - 7.00 10*3/mm3    Lymphocytes Absolute 0.94 0.70 - 3.10 10*3/mm3    Monocytes Absolute 0.39 0.10 - 0.90 10*3/mm3    Eosinophils Absolute 0.41 (H) 0.00 - 0.40 10*3/mm3    Basophils Absolute 0.05 0.00 - 0.20 10*3/mm3    Immature Granulocyte Rel % 0.2 0.0 - 0.5 %    Immature Grans Absolute 0.01 0.00 - 0.05 10*3/mm3    nRBC 0.0 0.0 - 0.2 /100 WBC   Urinalysis With Microscopic - Urine, Clean Catch    Specimen: Urine, Clean Catch   Result Value Ref Range    Specific Gravity, UA 1.016 1.005 - 1.030    pH, UA 6.5 5.0 - 8.0    Color, UA Yellow     Appearance, UA Clear Clear    Leukocytes, UA Negative Negative    Protein Negative Negative    Glucose, UA Negative Negative    Ketones Negative Negative    Blood, UA  Negative Negative    Bilirubin, UA Negative Negative    Urobilinogen, UA Comment     Nitrite, UA Negative Negative           Lissette Rea 48 y.o. female who presents for ADD/ADHD follow up. They are well controlled on their current Rx.  No new problems with insomnia, tachycardia, chest pain, or weight loss. The patient has read and signed the [facility] Controlled Substance Contract.  I will continue to see patient for regular follow up appointments and give the lowest effective dose.  They are well controlled on their medication at the lowest effective dose.  She has a previous history of a diagnosis of  ADD/ADHD.   ASIF has been reviewed by me and is updated every 3 months. The patient is aware of the potential for addiction and dependence.  The Rx continues to help them concentrate, finish tasks in timely manor, and succeed.  She denies current suicidal and homicidal ideation.  Reviewed causes for potential of discontinuation of stimulant medication,  including but not limited to consideration for diversion, obtaining other controlled substances from other license practitioners, or  inappropriate office behavior.  Patient understands to use a controlled substance as prescribed by physician and to avoid improper use of controlled substances.  Patient will also verbalized understanding that prescriptions to be filled at the same pharmacy  unless office staff is made aware of this.  Patient understands they can be submitted to random urine drug screens and/or random pill counts on any request.  Patient understands they are not to receive early refills.  The patient must produce an official police report for any effort to replace controlled substances that are lost or stolen.  No use of illegal street drugs while receiving controlled substances from this prescribing provider.  The patient is to take medication as prescribed  and not deviate from the normal schedule without consultation from the provider.   "Patient is not to share the medication with others or to take medication with alcohol or other sedatives.  Use caution when driving or operating machinery.  Must always keep the prescription in the original container.  Patient is to store controlled substances in a locked cabinet or other secure storage unit that is cool, dry and out of sunlight.  Patient must immediately notify office if this controlled substances is  improperly taken by another individual.  Reviewed risk for physical dependence, tolerance and addiction.    There is no evidence of aberrant behavior or any red flags.  .   7/1/2015 [facility] neuropsych evaluation.     Note to avoid ACE ARB's due to angioedema reaction   Last labs were 10/6/2023  Here for follow up for Essential Hypertension and well controlled on medication and continue Rx.    Lissette Rea female 48 y.o., /72   Pulse 82   Temp 97.4 °F (36.3 °C)   Resp 16   Ht 157.5 cm (62\")   Wt 71.7 kg (158 lb)   LMP  (LMP Unknown)   SpO2 100%   BMI 28.90 kg/m²   who presents today for follow up of Depression and Anxiety.  She reports medication is working well, patient desires to continue on Rx, and needs refill. Onset of symptoms was approximately several years ago. She denies current suicidal and homicidal ideation. Risk factors are family history of anxiety and or depression and lifestyle of multiple roles.  Previous treatment includes current Rx. She complains of the following medication side effects:none. The patient declines to go to counseling..    The following portions of the patient's history were reviewed and updated as appropriate: allergies, current medications, past family history, past medical history, past social history, past surgical history, and problem list.    Review of Systems   Constitutional:  Negative for diaphoresis.   HENT:  Negative for nosebleeds and trouble swallowing.    Eyes:  Negative for blurred vision and visual disturbance. "   Respiratory:  Negative for choking.    Gastrointestinal:  Negative for blood in stool.   Allergic/Immunologic: Negative for immunocompromised state.   Neurological:  Negative for facial asymmetry and speech difficulty.   Psychiatric/Behavioral:  Positive for decreased concentration. Negative for dysphoric mood, self-injury and suicidal ideas. The patient is not nervous/anxious.        Objective   Physical Exam  Vitals and nursing note reviewed.   Constitutional:       General: She is not in acute distress.     Appearance: She is well-developed. She is not ill-appearing or toxic-appearing.   HENT:      Head: Normocephalic.      Right Ear: External ear normal.      Left Ear: External ear normal.      Nose: Nose normal.      Mouth/Throat:      Pharynx: Oropharynx is clear.   Eyes:      General: No scleral icterus.     Conjunctiva/sclera: Conjunctivae normal.      Pupils: Pupils are equal, round, and reactive to light.   Neck:      Thyroid: No thyromegaly.   Cardiovascular:      Rate and Rhythm: Normal rate and regular rhythm.      Pulses: Normal pulses.      Heart sounds: Normal heart sounds. No murmur heard.  Pulmonary:      Effort: Pulmonary effort is normal. No respiratory distress.      Breath sounds: Normal breath sounds. No rales.   Musculoskeletal:         General: No deformity. Normal range of motion.      Cervical back: Normal range of motion and neck supple.      Right lower leg: No edema.      Left lower leg: No edema.   Skin:     General: Skin is warm and dry.      Findings: No rash.   Neurological:      General: No focal deficit present.      Mental Status: She is alert and oriented to person, place, and time. Mental status is at baseline.   Psychiatric:         Mood and Affect: Mood normal.         Behavior: Behavior normal.         Thought Content: Thought content normal.         Judgment: Judgment normal.           Assessment & Plan   Diagnoses and all orders for this visit:    1. Essential  hypertension (Primary)    2. Attention deficit disorder (ADD) without hyperactivity    3. Generalized anxiety disorder    4. Moderate persistent asthma without complication    5. Vitamin D deficiency    6. Recurrent major depressive disorder, in full remission    7. Gastroesophageal reflux disease without esophagitis        Updating controlled substance contract--reviewed Hiro  And will also have to do yearly drug screen to continue on the Adderall  Plan, Lissette Rea, was seen today.  she was seen for HTN and continue medication, GERD and will continue on PPI medication, Asthma and is well controlled on medication.  , and Vitamin D deficiency and supplemented.           Answers submitted by the patient for this visit:  Other (Submitted on 1/17/2024)  Please describe your symptoms.: ADHD follow up  Have you had these symptoms before?: Yes  How long have you been having these symptoms?: Greater than 2 weeks  Primary Reason for Visit (Submitted on 1/17/2024)  What is the primary reason for your visit?: Other

## 2024-01-18 RX ORDER — DEXTROAMPHETAMINE SACCHARATE, AMPHETAMINE ASPARTATE, DEXTROAMPHETAMINE SULFATE AND AMPHETAMINE SULFATE 7.5; 7.5; 7.5; 7.5 MG/1; MG/1; MG/1; MG/1
30 TABLET ORAL 2 TIMES DAILY
Qty: 60 TABLET | Refills: 0 | Status: SHIPPED | OUTPATIENT
Start: 2024-01-18

## 2024-02-26 ENCOUNTER — PATIENT MESSAGE (OUTPATIENT)
Dept: FAMILY MEDICINE CLINIC | Facility: CLINIC | Age: 49
End: 2024-02-26
Payer: COMMERCIAL

## 2024-02-26 RX ORDER — DEXTROAMPHETAMINE SACCHARATE, AMPHETAMINE ASPARTATE, DEXTROAMPHETAMINE SULFATE AND AMPHETAMINE SULFATE 7.5; 7.5; 7.5; 7.5 MG/1; MG/1; MG/1; MG/1
30 TABLET ORAL 2 TIMES DAILY
Qty: 60 TABLET | Refills: 0 | Status: SHIPPED | OUTPATIENT
Start: 2024-02-26

## 2024-02-26 NOTE — TELEPHONE ENCOUNTER
From: Lissette Rea  To: Trang Doherty  Sent: 2/26/2024 5:05 AM EST  Subject: Adderall     Good morning,  [facility] you [facility]? Thank you.   
95
Yes

## 2024-03-18 RX ORDER — AMLODIPINE BESYLATE 10 MG/1
10 TABLET ORAL DAILY
Qty: 90 TABLET | Refills: 3 | Status: SHIPPED | OUTPATIENT
Start: 2024-03-18

## 2024-03-27 ENCOUNTER — PATIENT MESSAGE (OUTPATIENT)
Dept: FAMILY MEDICINE CLINIC | Facility: CLINIC | Age: 49
End: 2024-03-27
Payer: COMMERCIAL

## 2024-03-27 RX ORDER — DEXTROAMPHETAMINE SACCHARATE, AMPHETAMINE ASPARTATE, DEXTROAMPHETAMINE SULFATE AND AMPHETAMINE SULFATE 7.5; 7.5; 7.5; 7.5 MG/1; MG/1; MG/1; MG/1
30 TABLET ORAL 2 TIMES DAILY
Qty: 60 TABLET | Refills: 0 | Status: SHIPPED | OUTPATIENT
Start: 2024-03-27

## 2024-03-27 NOTE — TELEPHONE ENCOUNTER
From: Lissette Rea  To: Trang Doherty  Sent: 3/27/2024 5:15 AM EDT  Subject: Adderall     Good morning,    Can you send [facility] to [facility].     Thank you

## 2024-04-05 ENCOUNTER — PRIOR AUTHORIZATION (OUTPATIENT)
Dept: FAMILY MEDICINE CLINIC | Facility: CLINIC | Age: 49
End: 2024-04-05
Payer: COMMERCIAL

## 2024-04-05 NOTE — TELEPHONE ENCOUNTER
MORALES OLSON (Simon: [id number])  PA Case ID #: [id number]  Need Help? Call us at [phone]  Status  sent iconSent to Plan today  Drug  Adderall 30MG tablets  ePA cloud logo  Form  [employer] Electronic [employer] Form (2017 [employer])

## 2024-04-08 ENCOUNTER — APPOINTMENT (OUTPATIENT)
Dept: WOMENS IMAGING | Facility: HOSPITAL | Age: 49
End: 2024-04-08
Payer: COMMERCIAL

## 2024-04-08 ENCOUNTER — OFFICE VISIT (OUTPATIENT)
Dept: FAMILY MEDICINE CLINIC | Facility: CLINIC | Age: 49
End: 2024-04-08
Payer: COMMERCIAL

## 2024-04-08 VITALS
WEIGHT: 162 LBS | BODY MASS INDEX: 29.81 KG/M2 | DIASTOLIC BLOOD PRESSURE: 64 MMHG | HEART RATE: 80 BPM | HEIGHT: 62 IN | SYSTOLIC BLOOD PRESSURE: 128 MMHG | OXYGEN SATURATION: 99 %

## 2024-04-08 DIAGNOSIS — E66.3 OVERWEIGHT (BMI 25.0-29.9): Primary | ICD-10-CM

## 2024-04-08 DIAGNOSIS — I10 ESSENTIAL HYPERTENSION: ICD-10-CM

## 2024-04-08 DIAGNOSIS — F41.1 GENERALIZED ANXIETY DISORDER: ICD-10-CM

## 2024-04-08 DIAGNOSIS — J45.30 MILD PERSISTENT ASTHMA WITHOUT COMPLICATION: ICD-10-CM

## 2024-04-08 DIAGNOSIS — F33.42 RECURRENT MAJOR DEPRESSIVE DISORDER, IN FULL REMISSION: ICD-10-CM

## 2024-04-08 DIAGNOSIS — F90.0 ATTENTION DEFICIT HYPERACTIVITY DISORDER (ADHD), PREDOMINANTLY INATTENTIVE TYPE: ICD-10-CM

## 2024-04-08 PROCEDURE — 77063 BREAST TOMOSYNTHESIS BI: CPT | Performed by: RADIOLOGY

## 2024-04-08 PROCEDURE — 99214 OFFICE O/P EST MOD 30 MIN: CPT | Performed by: PHYSICIAN ASSISTANT

## 2024-04-08 PROCEDURE — 77067 SCR MAMMO BI INCL CAD: CPT | Performed by: RADIOLOGY

## 2024-04-08 RX ORDER — VILAZODONE HYDROCHLORIDE 40 MG/1
40 TABLET ORAL DAILY
Qty: 90 TABLET | Refills: 1 | Status: SHIPPED | OUTPATIENT
Start: 2024-04-08 | End: 2024-04-15

## 2024-04-08 RX ORDER — TOLTERODINE 4 MG/1
4 CAPSULE, EXTENDED RELEASE ORAL DAILY
Qty: 90 CAPSULE | Refills: 3 | Status: SHIPPED | OUTPATIENT
Start: 2024-04-08

## 2024-04-08 RX ORDER — ALBUTEROL SULFATE 90 UG/1
2 AEROSOL, METERED RESPIRATORY (INHALATION) EVERY 4 HOURS PRN
Qty: 18 G | Refills: 3 | Status: SHIPPED | OUTPATIENT
Start: 2024-04-08

## 2024-04-08 RX ORDER — MONTELUKAST SODIUM 10 MG/1
10 TABLET ORAL NIGHTLY
Qty: 90 TABLET | Refills: 3 | Status: SHIPPED | OUTPATIENT
Start: 2024-04-08

## 2024-04-08 RX ORDER — BUDESONIDE AND FORMOTEROL FUMARATE DIHYDRATE 160; 4.5 UG/1; UG/1
2 AEROSOL RESPIRATORY (INHALATION)
Qty: 30.6 EACH | Refills: 3 | Status: SHIPPED | OUTPATIENT
Start: 2024-04-08

## 2024-04-08 RX ORDER — TRIAMTERENE AND HYDROCHLOROTHIAZIDE 37.5; 25 MG/1; MG/1
1 CAPSULE ORAL EVERY MORNING
Qty: 90 CAPSULE | Refills: 1 | Status: SHIPPED | OUTPATIENT
Start: 2024-04-08

## 2024-04-08 RX ORDER — DEXTROAMPHETAMINE SACCHARATE, AMPHETAMINE ASPARTATE, DEXTROAMPHETAMINE SULFATE AND AMPHETAMINE SULFATE 7.5; 7.5; 7.5; 7.5 MG/1; MG/1; MG/1; MG/1
30 TABLET ORAL 2 TIMES DAILY
Qty: 60 TABLET | Refills: 0 | Status: SHIPPED | OUTPATIENT
Start: 2024-04-08

## 2024-04-08 RX ORDER — ALBUTEROL SULFATE 2.5 MG/3ML
2.5 SOLUTION RESPIRATORY (INHALATION) EVERY 4 HOURS PRN
Qty: 120 EACH | Refills: 3 | Status: SHIPPED | OUTPATIENT
Start: 2024-04-08

## 2024-04-08 RX ORDER — SEMAGLUTIDE 0.25 MG/.5ML
0.25 INJECTION, SOLUTION SUBCUTANEOUS WEEKLY
Qty: 2 ML | Refills: 0 | Status: SHIPPED | OUTPATIENT
Start: 2024-04-08

## 2024-04-08 NOTE — PATIENT INSTRUCTIONS
Attention Deficit Hyperactivity Disorder, Adult  Attention deficit hyperactivity disorder (ADHD) is a mental health disorder that starts during childhood. For many people with ADHD, the disorder continues into the adult years. Treatment can help you manage your symptoms.  There are three main types of ADHD:  Inattentive. With this type, adults have difficulty paying attention. This may affect cognitive abilities.  Hyperactive-impulsive. With this type, adults have a lot of energy and have difficulty controlling their behavior.  Combination type. Some people may have symptoms of both types.  What are the causes?  The exact cause of ADHD is not known. Most experts believe a person's genes and environment possibly contribute to ADHD.  What increases the risk?  The following factors may make you more likely to develop this condition:  Having a first-degree relative such as a parent, brother, or sister, with the condition.  Being born before 37 weeks of pregnancy (prematurely) or at a low birth weight.  Being born to a mother who smoked tobacco or drank alcohol during pregnancy.  Having experienced a brain injury.  Being exposed to lead or other toxins in the womb or early in life.  What are the signs or symptoms?  Symptoms of this condition depend on the type of ADHD.  Symptoms of the inattentive type include:  Difficulty paying attention or following instructions.  Often making simple mistakes.  Being disorganized.  Avoiding tasks that require time and attention.  Losing and forgetting things.  Symptoms of the hyperactive-impulsive type include:  Restlessness.  Talking out of turn, interrupting others, or talking too much.  Difficulty with:  Sitting still.  Feeling motivated.  Relaxing.  Waiting in line or waiting for a turn.  People with the combination type have symptoms of both of the other types.  In adults, this condition may lead to certain problems, such as:  Keeping jobs.  Performing tasks at work.  Having  stable relationships.  Being on time or keeping to a schedule.  How is this diagnosed?  This condition is diagnosed based on your current symptoms and your history of symptoms. The diagnosis can be made by a health care provider such as a primary care provider or a mental health care specialist.  Your health care provider may use a symptom checklist or a behavior rating scale to evaluate your symptoms. Your health care provider may also want to talk with people who have observed your behaviors throughout your life.  How is this treated?  This condition can be treated with medicines and behavior therapy. Medicines may be the best option to reduce impulsive behaviors and improve attention. Your health care provider may recommend:  Stimulant medicines. These are the most common medicines used for adult ADHD. They affect certain chemicals in the brain (neurotransmitters) and improve your ability to control your symptoms.  A non-stimulant medicine. These medicines can also improve focus, attention, and impulsive behavior. It may take weeks to months to see the effects of this medicine.  Counseling and behavioral management are also important for treating ADHD. Counseling is often used along with medicine. Your health care provider may suggest:  Cognitive behavioral therapy (CBT). This type of therapy teaches you to replace negative thoughts and actions with positive thoughts and actions. When used as part of ADHD treatment, this therapy may also include:  Coping strategies for organization, time management, impulse control, and stress reduction.  Mindfulness and meditation training.  Behavioral management. You may work with a  who is specially trained to help people with ADHD manage and organize activities and function more effectively.  Follow these instructions at home:  Medicines    Take over-the-counter and prescription medicines only as told by your health care provider.  Talk with your health care provider  about the possible side effects of your medicines and how to manage them.  Alcohol use  Do not drink alcohol if:  Your health care provider tells you not to drink.  You are pregnant, may be pregnant, or are planning to become pregnant.  If you drink alcohol:  Limit how much you use to:  0-1 drink a day for women.  0-2 drinks a day for men.  Know how much alcohol is in your drink. In the [place] one drink equals one 12 oz bottle of beer (355 mL), one 5 oz glass of wine (148 mL), or one 1½ oz glass of hard liquor (44 mL).  Lifestyle    Do not use illegal drugs.  Get enough sleep.  Eat a healthy diet.  Exercise regularly. Exercise can help to reduce stress and anxiety.  General instructions  Learn as much as you can about adult ADHD, and work closely with your health care providers to find the treatments that work best for you.  Follow the same schedule each day.  Use reminder devices like notes, calendars, and phone apps to stay on time and organized.  Keep all follow-up visits. Your health care provider will need to monitor your condition and adjust your treatment over time.  Where to find more information  A health care provider may be able to recommend resources that are available online or over the phone. You could start with:  Attention Deficit Disorder Association (ADDA): add.org  National [place] of Mental Health (NIMH): nimh.nih.gov  Contact a health care provider if:  Your symptoms continue to cause problems.  You have side effects from your medicine, such as:  Repeated muscle twitches, coughing, or speech outbursts.  Sleep problems.  Loss of appetite.  Dizziness.  Unusually fast heartbeat.  Stomach pains.  Headaches.  You are struggling with anxiety, depression, or substance abuse.  Get help right away if:  You have a severe reaction to a medicine.  This symptom may be an emergency. Get help right away. Call 911.  Do not wait to see if the symptom will go away.  Do not drive yourself to the hospital.  Take  one of these steps if you feel like you may hurt yourself or others, or have thoughts about taking your own life:  Go to your nearest emergency room.  Call 911.  Call the National Suicide Prevention Lifeline at 1-352.449.3004 or 390. This is open 24 hours a day  Text the Crisis Text Line at 702353.  Summary  ADHD is a mental health disorder that starts during childhood and often continues into your adult years.  The exact cause of ADHD is not known. Most experts believe genetics and environmental factors contribute to ADHD.  There is no cure for ADHD, but treatment with medicine, cognitive behavioral therapy, or behavioral management can help you manage your condition.  This information is not intended to replace advice given to you by your health care provider. Make sure you discuss any questions you have with your health care provider.  Document Revised: 04/07/2023 Document Reviewed: 04/07/2023  Elsevier Patient Education © 2023 [employer]vier Inc.

## 2024-04-08 NOTE — PROGRESS NOTES
Subjective   Lissette Rea is a 48 y.o. female.     History of Present Illness    Since the last visit, she has overall felt fairly well.  She has Primary Hypertension and well controlled on current medication, Seasonal allergies and doing well on their medication , Asthma well controlled and not requiring rescue Albuterol > 2 times a week, and Vitamin D deficiency and labs are at goal >30 ng/mL.  she has been compliant with current medications have reviewed them.  The patient denies medication side effects.  Will refill medications. LMP  (LMP Unknown) .  BMI is >= 25 and <30. (Overweight) The following options were offered after discussion;: weight loss educational material (shared in after visit summary), exercise counseling/recommendations, and nutrition counseling/recommendations  Note to avoid ACE ARB's due to angioedema reaction   I am concerned that her BMI is increased its almost at 30 and she already has hypertension... She is exercising she is watching her diet just frustrated with her weight gain..  Results for orders placed or performed in visit on 07/19/23   Lipid panel    Specimen: Blood   Result Value Ref Range    Total Cholesterol 191 0 - 200 mg/dL    Triglycerides 54 0 - 150 mg/dL    HDL Cholesterol 85 (H) 40 - 60 mg/dL    VLDL Cholesterol Jose R 10 5 - 40 mg/dL    LDL Chol Calc (NIH) 96 0 - 100 mg/dL   TSH    Specimen: Blood   Result Value Ref Range    TSH 2.100 0.270 - 4.200 uIU/mL   Hemoglobin A1c    Specimen: Blood   Result Value Ref Range    Hemoglobin A1C 5.10 4.80 - 5.60 %   T4, Free    Specimen: Blood   Result Value Ref Range    Free T4 1.05 0.93 - 1.70 ng/dL   Vitamin B12    Specimen: Blood   Result Value Ref Range    Vitamin B-12 343 211 - 946 pg/mL   Folate    Specimen: Blood   Result Value Ref Range    Folate 9.09 4.78 - 24.20 ng/mL   Vitamin D,25-Hydroxy    Specimen: Blood   Result Value Ref Range    25 Hydroxy, Vitamin D 35.5 30.0 - 100.0 ng/ml   Magnesium    Specimen: Blood   Result  Value Ref Range    Magnesium 2.4 1.6 - 2.6 mg/dL   FSH & LH    Specimen: Blood   Result Value Ref Range    LH 9.3 mIU/mL    FSH 12.1 mIU/mL   Comprehensive metabolic panel    Specimen: Blood   Result Value Ref Range    Glucose 87 65 - 99 mg/dL    BUN 24 (H) 6 - 20 mg/dL    Creatinine 0.83 0.57 - 1.00 mg/dL    EGFR Result 86.5 >60.0 mL/min/1.73    BUN/Creatinine Ratio 28.9 (H) 7.0 - 25.0    Sodium 141 136 - 145 mmol/L    Potassium 4.7 3.5 - 5.2 mmol/L    Chloride 105 98 - 107 mmol/L    Total CO2 28.2 22.0 - 29.0 mmol/L    Calcium 9.2 8.6 - 10.5 mg/dL    Total Protein 6.8 6.0 - 8.5 g/dL    Albumin 4.4 3.5 - 5.2 g/dL    Globulin 2.4 gm/dL    A/G Ratio 1.8 g/dL    Total Bilirubin 0.3 0.0 - 1.2 mg/dL    Alkaline Phosphatase 66 39 - 117 U/L    AST (SGOT) 20 1 - 32 U/L    ALT (SGPT) 18 1 - 33 U/L   Microscopic Examination -   Result Value Ref Range    WBC, UA 0-2 /HPF    RBC, UA 0-2 /HPF    Epithelial Cells (non renal) 3-6 (A) /HPF    Cast Type Comment     Bacteria, UA Comment None Seen /HPF   CBC and Differential    Specimen: Blood   Result Value Ref Range    WBC 5.70 3.40 - 10.80 10*3/mm3    RBC 4.37 3.77 - 5.28 10*6/mm3    Hemoglobin 13.4 12.0 - 15.9 g/dL    Hematocrit 40.1 34.0 - 46.6 %    MCV 91.8 79.0 - 97.0 fL    MCH 30.7 26.6 - 33.0 pg    MCHC 33.4 31.5 - 35.7 g/dL    RDW 12.0 (L) 12.3 - 15.4 %    Platelets 375 140 - 450 10*3/mm3    Neutrophil Rel % 68.4 42.7 - 76.0 %    Lymphocyte Rel % 16.5 (L) 19.6 - 45.3 %    Monocyte Rel % 6.8 5.0 - 12.0 %    Eosinophil Rel % 7.2 (H) 0.3 - 6.2 %    Basophil Rel % 0.9 0.0 - 1.5 %    Neutrophils Absolute 3.90 1.70 - 7.00 10*3/mm3    Lymphocytes Absolute 0.94 0.70 - 3.10 10*3/mm3    Monocytes Absolute 0.39 0.10 - 0.90 10*3/mm3    Eosinophils Absolute 0.41 (H) 0.00 - 0.40 10*3/mm3    Basophils Absolute 0.05 0.00 - 0.20 10*3/mm3    Immature Granulocyte Rel % 0.2 0.0 - 0.5 %    Immature Grans Absolute 0.01 0.00 - 0.05 10*3/mm3    nRBC 0.0 0.0 - 0.2 /100 WBC   Urinalysis With  Microscopic - Urine, Clean Catch    Specimen: Urine, Clean Catch   Result Value Ref Range    Specific Gravity, UA 1.016 1.005 - 1.030    pH, UA 6.5 5.0 - 8.0    Color, UA Yellow     Appearance, UA Clear Clear    Leukocytes, UA Negative Negative    Protein Negative Negative    Glucose, UA Negative Negative    Ketones Negative Negative    Blood, UA Negative Negative    Bilirubin, UA Negative Negative    Urobilinogen, UA Comment     Nitrite, UA Negative Negative     Lissette Rea 48 y.o. female who presents for ADD/ADHD follow up. They are well controlled on their current Rx.  No new problems with insomnia, tachycardia, chest pain, or weight loss. The patient has read and signed the [facility] Controlled Substance Contract.  I will continue to see patient for regular follow up appointments and give the lowest effective dose.  They are well controlled on their medication at the lowest effective dose.  She has a previous history of a diagnosis of  ADD/ADHD.   ASIF has been reviewed by me and is updated every 3 months. The patient is aware of the potential for addiction and dependence.  The Rx continues to help them concentrate, finish tasks in timely manor, and succeed.  She denies current suicidal and homicidal ideation.  Reviewed causes for potential of discontinuation of stimulant medication,  including but not limited to consideration for diversion, obtaining other controlled substances from other license practitioners, or  inappropriate office behavior.  Patient understands to use a controlled substance as prescribed by physician and to avoid improper use of controlled substances.  Patient will also verbalized understanding that prescriptions to be filled at the same pharmacy  unless office staff is made aware of this.  Patient understands they can be submitted to random urine drug screens and/or random pill counts on any request.  Patient understands they are not to receive early refills.  The patient  "must produce an official police report for any effort to replace controlled substances that are lost or stolen.  No use of illegal street drugs while receiving controlled substances from this prescribing provider.  The patient is to take medication as prescribed  and not deviate from the normal schedule without consultation from the provider.  Patient is not to share the medication with others or to take medication with alcohol or other sedatives.  Use caution when driving or operating machinery.  Must always keep the prescription in the original container.  Patient is to store controlled substances in a locked cabinet or other secure storage unit that is cool, dry and out of sunlight.  Patient must immediately notify office if this controlled substances is  improperly taken by another individual.  Reviewed risk for physical dependence, tolerance and addiction.    There is no evidence of aberrant behavior or any red flags.  .   7/1/2015 [facility] neuropsych evaluation.   Lissette Rea female 48 y.o., /64   Pulse 80   Ht 157.5 cm (62\")   Wt 73.5 kg (162 lb)   LMP  (LMP Unknown)   SpO2 99%   BMI 29.63 kg/m²   who presents today for follow up of Depression and Anxiety.  She reports medication is working well, patient desires to continue on Rx, and needs refill. Onset of symptoms was approximately several years ago. She denies current suicidal and homicidal ideation. Risk factors are family history of anxiety and or depression and lifestyle of multiple roles.  Previous treatment includes current Rx. She complains of the following medication side effects:none. The patient declines to go to counseling..      The following portions of the patient's history were reviewed and updated as appropriate: allergies, current medications, past family history, past medical history, past social history, past surgical history, and problem list.    Review of Systems   Constitutional:  Negative for " diaphoresis.   HENT:  Negative for nosebleeds and trouble swallowing.    Eyes:  Negative for blurred vision and visual disturbance.   Respiratory:  Negative for choking.    Gastrointestinal:  Negative for blood in stool.   Allergic/Immunologic: Negative for immunocompromised state.   Neurological:  Negative for facial asymmetry and speech difficulty.   Psychiatric/Behavioral:  Positive for decreased concentration. Negative for self-injury and suicidal ideas.        Objective   Physical Exam  Vitals and nursing note reviewed.   Constitutional:       General: She is not in acute distress.     Appearance: She is well-developed. She is not ill-appearing or toxic-appearing.   HENT:      Head: Normocephalic.      Right Ear: External ear normal.      Left Ear: External ear normal.      Nose: Nose normal.      Mouth/Throat:      Pharynx: Oropharynx is clear.   Eyes:      General: No scleral icterus.     Conjunctiva/sclera: Conjunctivae normal.      Pupils: Pupils are equal, round, and reactive to light.   Neck:      Thyroid: No thyromegaly.   Cardiovascular:      Rate and Rhythm: Normal rate and regular rhythm.      Heart sounds: Normal heart sounds. No murmur heard.  Pulmonary:      Effort: Pulmonary effort is normal. No respiratory distress.      Breath sounds: Normal breath sounds.   Musculoskeletal:         General: No deformity. Normal range of motion.      Cervical back: Normal range of motion and neck supple.      Right lower leg: No edema.      Left lower leg: No edema.   Skin:     General: Skin is warm and dry.      Findings: No rash.   Neurological:      General: No focal deficit present.      Mental Status: She is alert and oriented to person, place, and time. Mental status is at baseline.   Psychiatric:         Mood and Affect: Mood normal.         Behavior: Behavior normal.         Thought Content: Thought content normal.         Judgment: Judgment normal.           Assessment & Plan   Diagnoses and all orders  for this visit:    1. Overweight (BMI 25.0-29.9) (Primary)    2. Essential hypertension    3. Attention deficit hyperactivity disorder (ADHD), predominantly inattentive type    4. Generalized anxiety disorder    5. Recurrent major depressive disorder, in full remission    6. Mild persistent asthma without complication    Other orders  -     tolterodine LA (Detrol LA) 4 MG 24 hr capsule; Take 1 capsule by mouth Daily. For urinary urgency  Dispense: 90 capsule; Refill: 3  -     triamterene-hydrochlorothiazide (Dyazide) 37.5-25 MG per capsule; Take 1 capsule by mouth Every Morning. For BP  Dispense: 90 capsule; Refill: 1  -     vilazodone (Viibryd) 40 MG tablet tablet; Take 1 tablet by mouth Daily. For stress, new dose  Dispense: 90 tablet; Refill: 1  -     budesonide-formoterol (Symbicort) 160-4.5 MCG/ACT inhaler; Inhale 2 puffs 2 (Two) Times a Day. For asthma and rinse mouth after use  Dispense: 30.6 each; Refill: 3  -     albuterol sulfate  (90 Base) MCG/ACT inhaler; Inhale 2 puffs Every 4 (Four) Hours As Needed for Wheezing.  Dispense: 18 g; Refill: 3  -     albuterol (PROVENTIL) (2.5 MG/3ML) 0.083% nebulizer solution; Take 2.5 mg by nebulization Every 4 (Four) Hours As Needed for Wheezing.  Dispense: 120 each; Refill: 3        Concerned that her weight is now almost a BMI of 30 and she has primary hypertension we will start her on semaglutide and send to compound pharmacy--- she has no past history of pancreatitis or gastroparesis and no family history of thyroid cancer or M EN  Small frequent meals  GYN does erin Palma, Lissette Rea, was seen today.  she was seen for HTN and continue medication, Seasonal allergies and is doing well on their medication , Asthma and is well controlled on medication.  , and Vitamin D deficiency and supplemented.  Continue Adderall for ADD working well   Still need to get drug screen    Answers submitted by the patient for this visit:  Other (Submitted on  4/7/2024)  Please describe your symptoms.: ADD follow up  Have you had these symptoms before?: Yes  How long have you been having these symptoms?: Greater than 2 weeks  Please list any medications you are currently taking for this condition.: Adderall  Primary Reason for Visit (Submitted on 4/7/2024)  What is the primary reason for your visit?: Other

## 2024-04-15 ENCOUNTER — TELEPHONE (OUTPATIENT)
Dept: FAMILY MEDICINE CLINIC | Facility: CLINIC | Age: 49
End: 2024-04-15

## 2024-04-15 NOTE — TELEPHONE ENCOUNTER
I sent patient a message that she has to fail [facility] for medications before [facility]

## 2024-04-15 NOTE — TELEPHONE ENCOUNTER
Hub staff attempted to follow warm transfer process and was unsuccessful     Caller: [facility]    Best call back number: [phone]  REF #[id number]    Patient is needing: REQUESTING TO SPEAK WITH MA OR NURSE. STATES THAT PT'S INSURANCE IS NOT WANTING TO COVER vilazodone (Viibryd) 40 MG tablet.     RECOMMENDED ALTERNATIVES ARE:   ESCITALOPRAM   CITALOPRAM HYDROBROMIDE   PAROXETINE   FLUOXETINE

## 2024-04-19 ENCOUNTER — TELEPHONE (OUTPATIENT)
Dept: FAMILY MEDICINE CLINIC | Facility: CLINIC | Age: 49
End: 2024-04-19
Payer: COMMERCIAL

## 2024-04-19 NOTE — TELEPHONE ENCOUNTER
Spoke with Lena from [facility] patient Adderall was approve from 4/16/2024 to 4/19/2025   [id number]

## 2024-05-13 ENCOUNTER — TELEPHONE (OUTPATIENT)
Dept: FAMILY MEDICINE CLINIC | Facility: CLINIC | Age: 49
End: 2024-05-13
Payer: COMMERCIAL

## 2024-05-13 ENCOUNTER — PATIENT MESSAGE (OUTPATIENT)
Dept: FAMILY MEDICINE CLINIC | Facility: CLINIC | Age: 49
End: 2024-05-13
Payer: COMMERCIAL

## 2024-05-13 RX ORDER — SEMAGLUTIDE 0.25 MG/.5ML
0.25 INJECTION, SOLUTION SUBCUTANEOUS WEEKLY
Qty: 2 ML | Refills: 0 | Status: SHIPPED | OUTPATIENT
Start: 2024-05-13

## 2024-05-13 NOTE — TELEPHONE ENCOUNTER
Claudia called with [facility] and would like to get a verbal for compound [facility].   Please call her with the ok, if its ok.

## 2024-05-13 NOTE — TELEPHONE ENCOUNTER
From: Lissette Rea  To: Trang Doherty  Sent: 5/13/2024 6:37 AM EDT  Subject: Semaglutide    Good morning,  I just took my 5th shot. For a total loss of 11.1 pounds and 6 inches!!!!!!!!   I still have some medicine however the bottle says discard after May 17th. Will you send them a prescription so I can pick it up this weekend.     Thank you,   Arina

## 2024-05-14 NOTE — TELEPHONE ENCOUNTER
Called and spoke with Hernan at [facility].  RX sent yesterday and ok to compound.

## 2024-05-28 ENCOUNTER — PATIENT MESSAGE (OUTPATIENT)
Dept: FAMILY MEDICINE CLINIC | Facility: CLINIC | Age: 49
End: 2024-05-28
Payer: COMMERCIAL

## 2024-05-29 RX ORDER — DEXTROAMPHETAMINE SACCHARATE, AMPHETAMINE ASPARTATE, DEXTROAMPHETAMINE SULFATE AND AMPHETAMINE SULFATE 7.5; 7.5; 7.5; 7.5 MG/1; MG/1; MG/1; MG/1
30 TABLET ORAL 2 TIMES DAILY
Qty: 60 TABLET | Refills: 0 | Status: SHIPPED | OUTPATIENT
Start: 2024-05-29

## 2024-06-10 ENCOUNTER — PATIENT MESSAGE (OUTPATIENT)
Dept: FAMILY MEDICINE CLINIC | Facility: CLINIC | Age: 49
End: 2024-06-10
Payer: COMMERCIAL

## 2024-06-10 RX ORDER — SEMAGLUTIDE 0.5 MG/.5ML
0.5 INJECTION, SOLUTION SUBCUTANEOUS WEEKLY
Qty: 2 ML | Refills: 2 | Status: SHIPPED | OUTPATIENT
Start: 2024-06-10

## 2024-06-10 NOTE — TELEPHONE ENCOUNTER
From: Lissette Rea  To: Trang Doherty  Sent: 6/10/2024 7:39 AM EDT  Subject: Semaglutide    Good morning,    Will you please send my prescription for Semaglutide to the pharmacy? What is the next dosage amount?

## 2024-06-23 RX ORDER — TRIAMTERENE AND HYDROCHLOROTHIAZIDE 37.5; 25 MG/1; MG/1
1 CAPSULE ORAL EVERY MORNING
Qty: 90 CAPSULE | Refills: 1 | Status: SHIPPED | OUTPATIENT
Start: 2024-06-23

## 2024-07-03 RX ORDER — DEXTROAMPHETAMINE SACCHARATE, AMPHETAMINE ASPARTATE, DEXTROAMPHETAMINE SULFATE AND AMPHETAMINE SULFATE 7.5; 7.5; 7.5; 7.5 MG/1; MG/1; MG/1; MG/1
30 TABLET ORAL 2 TIMES DAILY
Qty: 60 TABLET | Refills: 0 | Status: CANCELLED | OUTPATIENT
Start: 2024-07-03

## 2024-07-04 RX ORDER — DEXTROAMPHETAMINE SACCHARATE, AMPHETAMINE ASPARTATE, DEXTROAMPHETAMINE SULFATE AND AMPHETAMINE SULFATE 7.5; 7.5; 7.5; 7.5 MG/1; MG/1; MG/1; MG/1
30 TABLET ORAL 2 TIMES DAILY
Qty: 60 TABLET | Refills: 0 | Status: SHIPPED | OUTPATIENT
Start: 2024-07-04

## 2024-07-10 ENCOUNTER — OFFICE VISIT (OUTPATIENT)
Dept: FAMILY MEDICINE CLINIC | Facility: CLINIC | Age: 49
End: 2024-07-10
Payer: COMMERCIAL

## 2024-07-10 VITALS
RESPIRATION RATE: 16 BRPM | OXYGEN SATURATION: 100 % | DIASTOLIC BLOOD PRESSURE: 80 MMHG | SYSTOLIC BLOOD PRESSURE: 130 MMHG | WEIGHT: 146.6 LBS | HEART RATE: 61 BPM | HEIGHT: 62 IN | BODY MASS INDEX: 26.98 KG/M2 | TEMPERATURE: 97.6 F

## 2024-07-10 DIAGNOSIS — K21.9 GASTROESOPHAGEAL REFLUX DISEASE WITHOUT ESOPHAGITIS: ICD-10-CM

## 2024-07-10 DIAGNOSIS — E55.9 VITAMIN D DEFICIENCY: ICD-10-CM

## 2024-07-10 DIAGNOSIS — J45.40 MODERATE PERSISTENT ASTHMA WITHOUT COMPLICATION: ICD-10-CM

## 2024-07-10 DIAGNOSIS — I10 ESSENTIAL HYPERTENSION: Primary | ICD-10-CM

## 2024-07-10 DIAGNOSIS — J30.9 CHRONIC ALLERGIC RHINITIS: ICD-10-CM

## 2024-07-10 DIAGNOSIS — F90.0 ATTENTION DEFICIT HYPERACTIVITY DISORDER (ADHD), PREDOMINANTLY INATTENTIVE TYPE: ICD-10-CM

## 2024-07-10 DIAGNOSIS — E53.8 LOW SERUM VITAMIN B12: ICD-10-CM

## 2024-07-10 PROCEDURE — 99214 OFFICE O/P EST MOD 30 MIN: CPT | Performed by: PHYSICIAN ASSISTANT

## 2024-07-10 RX ORDER — BUPROPION HYDROCHLORIDE 300 MG/1
300 TABLET ORAL EVERY MORNING
Qty: 90 TABLET | Refills: 3 | Status: SHIPPED | OUTPATIENT
Start: 2024-07-10

## 2024-07-15 ENCOUNTER — PATIENT MESSAGE (OUTPATIENT)
Dept: FAMILY MEDICINE CLINIC | Facility: CLINIC | Age: 49
End: 2024-07-15
Payer: COMMERCIAL

## 2024-07-18 RX ORDER — ONDANSETRON 4 MG/1
TABLET, FILM COATED ORAL
Qty: 20 TABLET | Refills: 1 | Status: SHIPPED | OUTPATIENT
Start: 2024-07-18

## 2024-07-18 NOTE — TELEPHONE ENCOUNTER
From: Lissette Rea  To: Trang Doherty  Sent: 7/15/2024 11:23 AM EDT  Subject: Semaglutide    Good morning,  Will you send my semaglutide to the pharmacy?    Thank you!

## 2024-07-31 ENCOUNTER — PRIOR AUTHORIZATION (OUTPATIENT)
Dept: FAMILY MEDICINE CLINIC | Facility: CLINIC | Age: 49
End: 2024-07-31
Payer: COMMERCIAL

## 2024-08-01 ENCOUNTER — PATIENT MESSAGE (OUTPATIENT)
Dept: FAMILY MEDICINE CLINIC | Facility: CLINIC | Age: 49
End: 2024-08-01
Payer: COMMERCIAL

## 2024-08-01 RX ORDER — DEXTROAMPHETAMINE SACCHARATE, AMPHETAMINE ASPARTATE, DEXTROAMPHETAMINE SULFATE AND AMPHETAMINE SULFATE 7.5; 7.5; 7.5; 7.5 MG/1; MG/1; MG/1; MG/1
30 TABLET ORAL 2 TIMES DAILY
Qty: 60 TABLET | Refills: 0 | Status: SHIPPED | OUTPATIENT
Start: 2024-08-01

## 2024-08-01 NOTE — TELEPHONE ENCOUNTER
From: Lissette Rea  To: Trang Doherty  Sent: 8/1/2024 7:16 AM EDT  Subject: Adderall     Good morning,    Will you please send my Adderall prescription to [facility] at your convenience.     Thank you!

## 2024-09-05 RX ORDER — DEXTROAMPHETAMINE SACCHARATE, AMPHETAMINE ASPARTATE, DEXTROAMPHETAMINE SULFATE AND AMPHETAMINE SULFATE 7.5; 7.5; 7.5; 7.5 MG/1; MG/1; MG/1; MG/1
30 TABLET ORAL 2 TIMES DAILY
Qty: 60 TABLET | Refills: 0 | Status: SHIPPED | OUTPATIENT
Start: 2024-09-05

## 2024-10-02 ENCOUNTER — OFFICE VISIT (OUTPATIENT)
Dept: FAMILY MEDICINE CLINIC | Facility: CLINIC | Age: 49
End: 2024-10-02
Payer: COMMERCIAL

## 2024-10-02 VITALS
OXYGEN SATURATION: 100 % | RESPIRATION RATE: 16 BRPM | SYSTOLIC BLOOD PRESSURE: 118 MMHG | WEIGHT: 137 LBS | TEMPERATURE: 97.8 F | HEIGHT: 62 IN | HEART RATE: 97 BPM | BODY MASS INDEX: 25.21 KG/M2 | DIASTOLIC BLOOD PRESSURE: 80 MMHG

## 2024-10-02 DIAGNOSIS — F90.0 ATTENTION DEFICIT HYPERACTIVITY DISORDER (ADHD), PREDOMINANTLY INATTENTIVE TYPE: Primary | ICD-10-CM

## 2024-10-02 DIAGNOSIS — I10 ESSENTIAL HYPERTENSION: ICD-10-CM

## 2024-10-02 PROCEDURE — 99213 OFFICE O/P EST LOW 20 MIN: CPT | Performed by: PHYSICIAN ASSISTANT

## 2024-10-02 RX ORDER — AMLODIPINE BESYLATE 5 MG/1
5 TABLET ORAL DAILY
Qty: 90 TABLET | Refills: 1 | Status: SHIPPED | OUTPATIENT
Start: 2024-10-02

## 2024-10-02 RX ORDER — DEXTROAMPHETAMINE SACCHARATE, AMPHETAMINE ASPARTATE, DEXTROAMPHETAMINE SULFATE AND AMPHETAMINE SULFATE 7.5; 7.5; 7.5; 7.5 MG/1; MG/1; MG/1; MG/1
30 TABLET ORAL 2 TIMES DAILY
Qty: 60 TABLET | Refills: 0 | Status: SHIPPED | OUTPATIENT
Start: 2024-10-02

## 2024-10-02 NOTE — PROGRESS NOTES
Subjective   Lissette Rea is a 49 y.o. female.     ADD  Associated symptoms include coughing. Pertinent negatives include no diaphoresis.      Lissette Rea 49 y.o. female who presents for ADD/ADHD follow up. They are well controlled on their current Rx.  No new problems with insomnia, tachycardia, chest pain, or weight loss---that is planned!!!!. The patient has read and signed the [facility] Controlled Substance Contract.  I will continue to see patient for regular follow up appointments and give the lowest effective dose.  They are well controlled on their medication at the lowest effective dose.  She has a previous history of a diagnosis of  ADD/ADHD.   ASIF has been reviewed by me and is updated every 3 months. The patient is aware of the potential for addiction and dependence.  The Rx continues to help them concentrate, finish tasks in timely manor, and succeed.  She denies current suicidal and homicidal ideation.  Reviewed causes for potential of discontinuation of stimulant medication,  including but not limited to consideration for diversion, obtaining other controlled substances from other license practitioners, or  inappropriate office behavior.  Patient understands to use a controlled substance as prescribed by physician and to avoid improper use of controlled substances.  Patient will also verbalized understanding that prescriptions to be filled at the same pharmacy  unless office staff is made aware of this.  Patient understands they can be submitted to random urine drug screens and/or random pill counts on any request.  Patient understands they are not to receive early refills.  The patient must produce an official police report for any effort to replace controlled substances that are lost or stolen.  No use of illegal street drugs while receiving controlled substances from this prescribing provider.  The patient is to take medication as prescribed  and not deviate from the normal  schedule without consultation from the provider.  Patient is not to share the medication with others or to take medication with alcohol or other sedatives.  Use caution when driving or operating machinery.  Must always keep the prescription in the original container.  Patient is to store controlled substances in a locked cabinet or other secure storage unit that is cool, dry and out of sunlight.  Patient must immediately notify office if this controlled substances is  improperly taken by another individual.  Reviewed risk for physical dependence, tolerance and addiction.    There is no evidence of aberrant behavior or any red flags.  .   7-1-15 [facility]ung  Normal EKG 3/8/2016  Down 30 lbs---she is on Semaglutide  Systolic BP stays 115 ---needs to go down on amlodipine to 5 mg daily    Does have pnd---some coughing --has asthma meds          The following portions of the patient's history were reviewed and updated as appropriate: allergies, current medications, past family history, past medical history, past social history, past surgical history, and problem list.    Review of Systems   Constitutional:  Negative for diaphoresis.   HENT:  Positive for postnasal drip. Negative for nosebleeds and trouble swallowing.    Eyes:  Negative for blurred vision and visual disturbance.   Respiratory:  Positive for cough. Negative for choking.    Gastrointestinal:  Negative for blood in stool.   Allergic/Immunologic: Negative for immunocompromised state.   Neurological:  Negative for facial asymmetry and speech difficulty.   Psychiatric/Behavioral:  Positive for decreased concentration. Negative for self-injury and suicidal ideas.        Objective   Physical Exam  Vitals and nursing note reviewed.   Constitutional:       General: She is not in acute distress.     Appearance: She is well-developed. She is not ill-appearing or toxic-appearing.   HENT:      Head: Normocephalic.      Right Ear: External ear normal.       Left Ear: External ear normal.      Nose: Nose normal.      Mouth/Throat:      Pharynx: Oropharynx is clear.   Eyes:      General: No scleral icterus.     Conjunctiva/sclera: Conjunctivae normal.      Pupils: Pupils are equal, round, and reactive to light.   Neck:      Thyroid: No thyromegaly.   Cardiovascular:      Rate and Rhythm: Normal rate and regular rhythm.      Heart sounds: Normal heart sounds. No murmur heard.  Pulmonary:      Effort: Pulmonary effort is normal. No respiratory distress.      Breath sounds: Normal breath sounds.   Musculoskeletal:         General: No deformity. Normal range of motion.      Cervical back: Normal range of motion and neck supple.   Skin:     General: Skin is warm and dry.      Findings: No rash.   Neurological:      General: No focal deficit present.      Mental Status: She is alert and oriented to person, place, and time. Mental status is at baseline.   Psychiatric:         Mood and Affect: Mood normal.         Behavior: Behavior normal.         Thought Content: Thought content normal.         Judgment: Judgment normal.           Assessment & Plan   Diagnoses and all orders for this visit:    1. Attention deficit hyperactivity disorder (ADHD), predominantly inattentive type (Primary)    2. Essential hypertension    Other orders  -     amLODIPine (NORVASC) 5 MG tablet; Take 1 tablet by mouth Daily. New lower dose for BP  Dispense: 90 tablet; Refill: 1        Continue Adderall for ADD working well  She need to watch blood pressure with her excellent weight loss and will continue Dyazide and take amlodipine down to 5 mg daily and continue meds for anxiety and depression working well and asthma meds  Down 30 lbs---she is on Semaglutide  Systolic BP stays 115 ---needs to go down on amlodipine to 5 mg daily for HTN         Answers submitted by the patient for this visit:  Other (Submitted on 9/25/2024)  Please describe your symptoms.: ADD follow up  Have you had these symptoms  before?: Yes  How long have you been having these symptoms?: Greater than 2 weeks  Please list any medications you are currently taking for this condition.: Adderall  Primary Reason for Visit (Submitted on 9/25/2024)  What is the primary reason for your visit?: Problem Not Listed

## 2024-10-08 RX ORDER — ONDANSETRON 4 MG/1
TABLET, FILM COATED ORAL
Qty: 30 TABLET | Refills: 11 | Status: SHIPPED | OUTPATIENT
Start: 2024-10-08

## 2024-10-10 ENCOUNTER — PATIENT MESSAGE (OUTPATIENT)
Dept: FAMILY MEDICINE CLINIC | Facility: CLINIC | Age: 49
End: 2024-10-10
Payer: COMMERCIAL

## 2024-10-10 RX ORDER — SEMAGLUTIDE 0.5 MG/.5ML
0.5 INJECTION, SOLUTION SUBCUTANEOUS WEEKLY
Qty: 2 ML | Refills: 2 | Status: SHIPPED | OUTPATIENT
Start: 2024-10-10

## 2024-10-10 NOTE — TELEPHONE ENCOUNTER
From: Lissette Rea  To: Trang Doherty  Sent: 10/10/2024 6:50 AM EDT  Subject: Semaglutide    Good morning,  Sorry I’m so needy this week. But I just realized I do not have any refills on my Semaglutide.

## 2024-11-06 ENCOUNTER — PATIENT MESSAGE (OUTPATIENT)
Dept: FAMILY MEDICINE CLINIC | Facility: CLINIC | Age: 49
End: 2024-11-06
Payer: COMMERCIAL

## 2024-11-06 RX ORDER — DEXTROAMPHETAMINE SACCHARATE, AMPHETAMINE ASPARTATE, DEXTROAMPHETAMINE SULFATE AND AMPHETAMINE SULFATE 7.5; 7.5; 7.5; 7.5 MG/1; MG/1; MG/1; MG/1
30 TABLET ORAL 2 TIMES DAILY
Qty: 60 TABLET | Refills: 0 | Status: SHIPPED | OUTPATIENT
Start: 2024-11-06

## 2024-12-06 RX ORDER — DEXTROAMPHETAMINE SACCHARATE, AMPHETAMINE ASPARTATE, DEXTROAMPHETAMINE SULFATE AND AMPHETAMINE SULFATE 7.5; 7.5; 7.5; 7.5 MG/1; MG/1; MG/1; MG/1
30 TABLET ORAL 2 TIMES DAILY
Qty: 60 TABLET | Refills: 0 | Status: SHIPPED | OUTPATIENT
Start: 2024-12-06

## 2024-12-29 RX ORDER — TRIAMTERENE AND HYDROCHLOROTHIAZIDE 37.5; 25 MG/1; MG/1
1 CAPSULE ORAL EVERY MORNING
Qty: 90 CAPSULE | Refills: 1 | Status: SHIPPED | OUTPATIENT
Start: 2024-12-29

## 2025-01-03 ENCOUNTER — OFFICE VISIT (OUTPATIENT)
Dept: FAMILY MEDICINE CLINIC | Facility: CLINIC | Age: 50
End: 2025-01-03
Payer: COMMERCIAL

## 2025-01-03 VITALS
DIASTOLIC BLOOD PRESSURE: 90 MMHG | HEIGHT: 62 IN | HEART RATE: 98 BPM | SYSTOLIC BLOOD PRESSURE: 160 MMHG | TEMPERATURE: 96.3 F | OXYGEN SATURATION: 99 % | WEIGHT: 141 LBS | BODY MASS INDEX: 25.95 KG/M2

## 2025-01-03 DIAGNOSIS — F41.1 GENERALIZED ANXIETY DISORDER: ICD-10-CM

## 2025-01-03 DIAGNOSIS — Z79.899 HIGH RISK MEDICATION USE: ICD-10-CM

## 2025-01-03 DIAGNOSIS — I10 ESSENTIAL HYPERTENSION: ICD-10-CM

## 2025-01-03 DIAGNOSIS — F90.0 ATTENTION DEFICIT HYPERACTIVITY DISORDER (ADHD), PREDOMINANTLY INATTENTIVE TYPE: Primary | ICD-10-CM

## 2025-01-03 PROCEDURE — 99214 OFFICE O/P EST MOD 30 MIN: CPT | Performed by: NURSE PRACTITIONER

## 2025-01-03 RX ORDER — DEXTROAMPHETAMINE SACCHARATE, AMPHETAMINE ASPARTATE, DEXTROAMPHETAMINE SULFATE AND AMPHETAMINE SULFATE 7.5; 7.5; 7.5; 7.5 MG/1; MG/1; MG/1; MG/1
30 TABLET ORAL 2 TIMES DAILY
Qty: 60 TABLET | Refills: 0 | Status: SHIPPED | OUTPATIENT
Start: 2025-01-03

## 2025-01-03 NOTE — PROGRESS NOTES
Chief Complaint  Med Refill, ADD, and Hypertension    Subjective        ADD  Pertinent negative symptoms include no abdominal pain, no chest pain, no chills, no congestion, no cough, no diaphoresis, no fatigue, no fever, no headaches, no myalgias, no nausea, no neck pain, no numbness, no rash, no sore throat, no dysuria, no vomiting and no weakness.   Hypertension  Pertinent negatives include no chest pain, headaches or neck pain.      Arina presents to [facility] as a 49-year-old female for a follow-up on ADHD, hypertension to refill medications.  Overall doing okay     ADD/ADHD follow up. They are well controlled on their current Rx.  No new problems with insomnia, tachycardia, chest pain, or weight loss. The patient has read and signed the [facility] Controlled Substance Contract.  I will continue to see patient for regular follow up appointments and give the lowest effective dose.  They are well controlled on their medication at the lowest effective dose.  She has a previous history of a diagnosis of  ADD/ADHD.   ASIF has been reviewed by me and is updated every 3 months. The patient is aware of the potential for addiction and dependence.  The Rx continues to help them concentrate, finish tasks in timely manor, and succeed.  She denies current suicidal and homicidal ideation.  Reviewed causes for potential of discontinuation of stimulant medication,  including but not limited to consideration for diversion, obtaining other controlled substances from other license practitioners, or  inappropriate office behavior.  Patient understands to use a controlled substance as prescribed by physician and to avoid improper use of controlled substances.  Patient will also verbalized understanding that prescriptions to be filled at the same pharmacy  unless office staff is made aware of this.  Patient understands they can be submitted to random urine drug screens and/or random pill counts  on any request.  Patient understands they are not to receive early refills.  The patient must produce an official police report for any effort to replace controlled substances that are lost or stolen.  No use of illegal street drugs while receiving controlled substances from this prescribing provider.  The patient is to take medication as prescribed  and not deviate from the normal schedule without consultation from the provider.  Patient is not to share the medication with others or to take medication with alcohol or other sedatives.  Use caution when driving or operating machinery.  Must always keep the prescription in the original container.  Patient is to store controlled substances in a locked cabinet or other secure storage unit that is cool, dry and out of sunlight.  Patient must immediately notify office if this controlled substances is  improperly taken by another individual.  Reviewed risk for physical dependence, tolerance and addiction.    There is no evidence of aberrant behavior or any red flags.  .     She has signed a controlled substance agreement with her PCP she does need a up-to-date urine tox ordered today  Discussed will need prior to next appointment    Hypertension-blood pressure slightly elevated in office today.  States that she usually takes her medication at nighttime and just took this morning.  She has had a lot increase stress and was rushing prior to this appointment  No headaches no blurred vision no dizziness no flushing no chest pain or pressure  BP usually runs around 1 10-1 20s over 70s    -Anxiety has been well-controlled on current medication.  She states that she takes her medication as directed.  She has had some increase stress recently.  Declines any need to change medication.  Will plan to follow-up with PCP to discuss further with any worsening.  Denies any SI or HI.  Not currently counseling    She has no other acute complaints today    The following portions of the  "patient's history were reviewed and updated as appropriate: allergies, current medications, past family history, past medical history, past social history, past surgical history, and problem list      Review of Systems   Constitutional:  Negative for chills, diaphoresis, fatigue and fever.   HENT:  Negative for congestion and sore throat.    Respiratory:  Negative for cough.    Cardiovascular:  Negative for chest pain.   Gastrointestinal:  Negative for abdominal pain, nausea and vomiting.   Genitourinary:  Negative for dysuria.   Musculoskeletal:  Negative for myalgias and neck pain.   Skin:  Negative for rash.   Neurological:  Negative for weakness and numbness.   Psychiatric/Behavioral:  Negative for self-injury, sleep disturbance and suicidal ideas. The patient is nervous/anxious.         Objective   Vital Signs:   Vitals:    01/03/25 0811   BP: 160/90   Pulse: 98   Temp: 96.3 °F (35.7 °C)   SpO2: 99%   Weight: 64 kg (141 lb)   Height: 157.5 cm (62.01\")   PainSc: 0-No pain            1/3/2025     8:12 AM   PHQ-2/PHQ-9 Depression Screening   Little interest or pleasure in doing things Not at all   Feeling down, depressed, or hopeless Not at all   How difficult have these problems made it for you to do your work, take care of things at home, or get along with other people? Not difficult at all                 Physical Exam  Vitals reviewed.   Constitutional:       General: She is not in acute distress.  Eyes:      Conjunctiva/sclera: Conjunctivae normal.   Neck:      Thyroid: No thyromegaly.      Vascular: No carotid bruit.   Cardiovascular:      Rate and Rhythm: Normal rate and regular rhythm.      Heart sounds: Normal heart sounds. No murmur heard.  Pulmonary:      Effort: Pulmonary effort is normal. No respiratory distress.      Breath sounds: Normal breath sounds. No stridor. No wheezing, rhonchi or rales.   Chest:      Chest wall: No tenderness.   Lymphadenopathy:      Cervical: No cervical adenopathy. "   Neurological:      Mental Status: She is alert and oriented to person, place, and time.   Psychiatric:         Attention and Perception: Attention normal.         Mood and Affect: Mood normal.          Result Review :     The following data was reviewed by: MARILOU Louie on 01/03/2025:           Assessment and Plan       Attention deficit hyperactivity disorder (ADHD), predominantly inattentive type  Psychological condition is stable.  Continue current treatment regimen.  Psychological condition  will be reassessed in 6 months.         Essential hypertension  Elevated in office today  Monitor BP at home bring log to next visit    Usually takes BP medication at night took in a.m. prior to this appointment  Increased stress         High risk medication use  Urine tox up-to-date  Needs up-to-date CSA with PCP on next appointment  Orders:    ToxAssure Flex 23, Ur -    Generalized anxiety disorder  Psychological condition is stable.  Continue current treatment regimen.  Psychological condition  will be reassessed in 6 months.               Follow Up   Return in about 3 months (around 4/3/2025) for Follow up/ Medication Check.  Patient was given instructions and counseling regarding her condition or for health maintenance advice. Please see specific information pulled into the AVS if appropriate.

## 2025-01-03 NOTE — TELEPHONE ENCOUNTER
ASIF query complete. Treatment plan to include limited course of prescribed  controlled substance. Risks including addiction, benefits, and alternatives presented to patient.       Last office visit 01/03/2024  Last refill 12/06/2024   Patient was last seen in our office on 1/12/17. Patient needs appointment for further refills or PCP can refill medication.

## 2025-01-03 NOTE — ASSESSMENT & PLAN NOTE
Elevated in office today  Monitor BP at home bring log to next visit    Usually takes BP medication at night took in a.m. prior to this appointment  Increased stress

## 2025-02-06 DIAGNOSIS — F90.0 ATTENTION DEFICIT HYPERACTIVITY DISORDER (ADHD), PREDOMINANTLY INATTENTIVE TYPE: ICD-10-CM

## 2025-02-06 RX ORDER — DEXTROAMPHETAMINE SACCHARATE, AMPHETAMINE ASPARTATE, DEXTROAMPHETAMINE SULFATE AND AMPHETAMINE SULFATE 7.5; 7.5; 7.5; 7.5 MG/1; MG/1; MG/1; MG/1
30 TABLET ORAL 2 TIMES DAILY
Qty: 60 TABLET | Refills: 0 | Status: SHIPPED | OUTPATIENT
Start: 2025-02-06

## 2025-02-16 ENCOUNTER — PATIENT MESSAGE (OUTPATIENT)
Dept: FAMILY MEDICINE CLINIC | Facility: CLINIC | Age: 50
End: 2025-02-16
Payer: COMMERCIAL

## 2025-02-16 RX ORDER — SEMAGLUTIDE 0.5 MG/.5ML
0.5 INJECTION, SOLUTION SUBCUTANEOUS WEEKLY
Qty: 2 ML | Refills: 11 | Status: SHIPPED | OUTPATIENT
Start: 2025-02-16

## 2025-03-06 ENCOUNTER — PATIENT MESSAGE (OUTPATIENT)
Dept: FAMILY MEDICINE CLINIC | Facility: CLINIC | Age: 50
End: 2025-03-06
Payer: COMMERCIAL

## 2025-03-06 DIAGNOSIS — F90.0 ATTENTION DEFICIT HYPERACTIVITY DISORDER (ADHD), PREDOMINANTLY INATTENTIVE TYPE: ICD-10-CM

## 2025-03-06 RX ORDER — DEXTROAMPHETAMINE SACCHARATE, AMPHETAMINE ASPARTATE, DEXTROAMPHETAMINE SULFATE AND AMPHETAMINE SULFATE 7.5; 7.5; 7.5; 7.5 MG/1; MG/1; MG/1; MG/1
30 TABLET ORAL 2 TIMES DAILY
Qty: 60 TABLET | Refills: 0 | Status: SHIPPED | OUTPATIENT
Start: 2025-03-06

## 2025-03-17 RX ORDER — TOLTERODINE 4 MG/1
CAPSULE, EXTENDED RELEASE ORAL
Qty: 90 CAPSULE | Refills: 3 | Status: SHIPPED | OUTPATIENT
Start: 2025-03-17

## 2025-03-17 RX ORDER — TRIAMTERENE AND HYDROCHLOROTHIAZIDE 37.5; 25 MG/1; MG/1
CAPSULE ORAL
Qty: 90 CAPSULE | Refills: 3 | Status: SHIPPED | OUTPATIENT
Start: 2025-03-17

## 2025-04-03 ENCOUNTER — OFFICE VISIT (OUTPATIENT)
Dept: FAMILY MEDICINE CLINIC | Facility: CLINIC | Age: 50
End: 2025-04-03
Payer: COMMERCIAL

## 2025-04-03 ENCOUNTER — PRIOR AUTHORIZATION (OUTPATIENT)
Dept: FAMILY MEDICINE CLINIC | Facility: CLINIC | Age: 50
End: 2025-04-03

## 2025-04-03 VITALS
TEMPERATURE: 97.9 F | SYSTOLIC BLOOD PRESSURE: 130 MMHG | HEIGHT: 62 IN | HEART RATE: 92 BPM | DIASTOLIC BLOOD PRESSURE: 82 MMHG | BODY MASS INDEX: 25.21 KG/M2 | WEIGHT: 137 LBS | OXYGEN SATURATION: 99 % | RESPIRATION RATE: 16 BRPM

## 2025-04-03 DIAGNOSIS — K21.9 GASTROESOPHAGEAL REFLUX DISEASE WITHOUT ESOPHAGITIS: ICD-10-CM

## 2025-04-03 DIAGNOSIS — E55.9 VITAMIN D DEFICIENCY: ICD-10-CM

## 2025-04-03 DIAGNOSIS — E53.8 LOW SERUM VITAMIN B12: ICD-10-CM

## 2025-04-03 DIAGNOSIS — J45.40 MODERATE PERSISTENT ASTHMA WITHOUT COMPLICATION: ICD-10-CM

## 2025-04-03 DIAGNOSIS — J30.9 CHRONIC ALLERGIC RHINITIS: ICD-10-CM

## 2025-04-03 DIAGNOSIS — F32.1 CURRENT MODERATE EPISODE OF MAJOR DEPRESSIVE DISORDER, UNSPECIFIED WHETHER RECURRENT: ICD-10-CM

## 2025-04-03 DIAGNOSIS — I10 ESSENTIAL HYPERTENSION: ICD-10-CM

## 2025-04-03 DIAGNOSIS — F33.42 RECURRENT MAJOR DEPRESSIVE DISORDER, IN FULL REMISSION: ICD-10-CM

## 2025-04-03 DIAGNOSIS — E66.09 CLASS 1 OBESITY DUE TO EXCESS CALORIES WITH SERIOUS COMORBIDITY AND BODY MASS INDEX (BMI) OF 30.0 TO 30.9 IN ADULT: ICD-10-CM

## 2025-04-03 DIAGNOSIS — F90.0 ATTENTION DEFICIT HYPERACTIVITY DISORDER (ADHD), PREDOMINANTLY INATTENTIVE TYPE: ICD-10-CM

## 2025-04-03 DIAGNOSIS — E78.2 HYPERLIPIDEMIA, MIXED: ICD-10-CM

## 2025-04-03 DIAGNOSIS — F90.0 ATTENTION DEFICIT HYPERACTIVITY DISORDER (ADHD), PREDOMINANTLY INATTENTIVE TYPE: Primary | ICD-10-CM

## 2025-04-03 DIAGNOSIS — E66.811 CLASS 1 OBESITY DUE TO EXCESS CALORIES WITH SERIOUS COMORBIDITY AND BODY MASS INDEX (BMI) OF 30.0 TO 30.9 IN ADULT: ICD-10-CM

## 2025-04-03 RX ORDER — ALBUTEROL SULFATE 90 UG/1
2 INHALANT RESPIRATORY (INHALATION) EVERY 4 HOURS PRN
Qty: 18 G | Refills: 3 | Status: SHIPPED | OUTPATIENT
Start: 2025-04-03

## 2025-04-03 RX ORDER — ALBUTEROL SULFATE 0.83 MG/ML
2.5 SOLUTION RESPIRATORY (INHALATION) EVERY 4 HOURS PRN
Qty: 120 EACH | Refills: 3 | Status: SHIPPED | OUTPATIENT
Start: 2025-04-03

## 2025-04-03 RX ORDER — AMLODIPINE BESYLATE 5 MG/1
5 TABLET ORAL DAILY
Qty: 90 TABLET | Refills: 1 | Status: SHIPPED | OUTPATIENT
Start: 2025-04-03

## 2025-04-03 RX ORDER — DEXTROAMPHETAMINE SACCHARATE, AMPHETAMINE ASPARTATE, DEXTROAMPHETAMINE SULFATE AND AMPHETAMINE SULFATE 7.5; 7.5; 7.5; 7.5 MG/1; MG/1; MG/1; MG/1
30 TABLET ORAL 2 TIMES DAILY
Qty: 60 TABLET | Refills: 0 | Status: SHIPPED | OUTPATIENT
Start: 2025-04-03

## 2025-04-03 RX ORDER — MONTELUKAST SODIUM 10 MG/1
10 TABLET ORAL NIGHTLY
Qty: 90 TABLET | Refills: 3 | Status: SHIPPED | OUTPATIENT
Start: 2025-04-03

## 2025-04-03 RX ORDER — SEMAGLUTIDE 0.5 MG/.5ML
0.5 INJECTION, SOLUTION SUBCUTANEOUS WEEKLY
Qty: 2 ML | Refills: 11 | Status: SHIPPED | OUTPATIENT
Start: 2025-04-03

## 2025-04-03 RX ORDER — BUDESONIDE AND FORMOTEROL FUMARATE DIHYDRATE 160; 4.5 UG/1; UG/1
2 AEROSOL RESPIRATORY (INHALATION)
Qty: 30.6 EACH | Refills: 3 | Status: SHIPPED | OUTPATIENT
Start: 2025-04-03

## 2025-04-03 NOTE — LETTER
Controlled Substance Prescribing Agreement          I, Lissette Rea [PATIENT],  1975 [] a patient of  Trang Doherty PA-C   [PROVIDER] at [facility] [PRACTICE], have been informed that  individuals who are prescribed certain Controlled Substances including, but not limited to, narcotic pain medicines, stimulants, benzodiazepine tranquilizers, and barbiturate sedatives, can abuse those substances or may allow abuse by others, and have some risk of developing an addictive disorder or suffering a relapse of a prior addiction. Therefore, I have been informed that it is necessary to observe strict rules pertaining to their use, and I agree to follow the terms and procedures described in this Agreement as consideration for, and as a condition of, the willingness of the physician whose signature appears below to consider prescribing or to continue prescribing Controlled Substances to treat my pain.     *Pt taking Adderall for ADHD    1. I will inform my physician of any current or past substance abuse, or any current or past substance abuse of any immediate member of my immediate family.     2. I agree that I may be subject to a voluntary evaluation by psychologists and/or psychiatrists, possibly at my own expense, before any Controlled Substances will be prescribed to me. I agree that the need to be evaluated by psychologists and/or psychiatrists may be revisited every three (3) to six (6) months thereafter while taking the medication.     3. All Controlled Substances must come from a provider in the PROVIDER’S PRACTICE. My Controlled Substances will come from the PROVIDER whose signature appears below, or during his or her absence, by the covering provider, unless specific written authorization is obtained from the office for an exception.     4. I will obtain all Controlled Substances from the same pharmacy. Should the need arise to change pharmacies, I will inform the  PROVIDER’S office.     5. I will inform the PROVIDER’S office of any new medications or medical conditions, and of any adverse effects I experience from any of the medications that I take.     6. I will inform my other health care providers that I am taking the Controlled Substances listed above, and of the existence of this Agreement. In the event of an emergency, I will provide the foregoing information to emergency department providers.     7. I agree that my prescribing PROVIDER has permission to discuss all diagnostic and treatment details with other health care providers, pharmacists, or other professionals who provide my health care regarding my use of Controlled Substances for purposes of maintaining accountability.     8. I will not allow anyone else to have, use sell, or otherwise have access to these medications. The sharing of medications with anyone is absolutely forbidden and is against the law.         9. I understand that Controlled Substances may be hazardous or lethal to a person who is not tolerant to their effects, especially a child, and that I must keep them out of reach of such people for their own safety.     10. I understand that tampering with a written prescription is a felony and I will not change or tamper with the PROVIDER’S written prescription.     11. I am aware that attempting to obtain a Controlled Substance under false pretenses is illegal.     12. I agree not to alter my medication in any way, and I will take my medication whole, and it will not be broken, chewed, crushed, injected, or snorted.     13. I will take my medication as instructed and prescribed, and I will not exceed the maximum prescribed dose. Any change in dosage must be approved by the PROVIDER or a physician within the PRACTICE.     14. I understand that these drugs should not be stopped abruptly, as withdrawal syndromes may develop.     15. I will cooperate with unannounced urine or serum toxicology screenings  as may be requested, as well as any random pill counts of medication by the PROVIDER. Failure to comply may result in termination of the PROVIDER-patient relationship.     16. I understand that the presence of unauthorized and/or illegal substances in the screenings described in the paragraph above may prompt referral for assessment for a substance abuse disorder or termination of the PROVIDER-patient relationship.     17. I understand that medications may not be replaced if they are lost, damaged, or stolen. If any of these situations arise that cause me to request an early refill of my medication, a copy of a filed police report or a statement from me explaining the circumstances may be required before additional prescriptions are considered. If I request an early refill secondary to lost, damaged, or stolen prescriptions twice within a year, I may be discharged from the practice.     18. I understand that a prescription may be given early if the PROVIDER or the patient will be out of town when the refill is due. These prescriptions will contain instructions to the pharmacist that the prescriptions(s) may not be filled prior to the appropriate date.     19. If the responsible legal authorities have questions concerning my treatment, as may occur, for example, if I obtained medication at several pharmacies, all confidentiality is waived, and these authorities may be given full access to my full records of Controlled Substances administration.     20. I will keep my scheduled appointments in order to receive medication renewals. If I need to cancel my appointment, I will do so a minimum of twenty-four (24) hours before it is scheduled.     21. I understand that I may be asked to bring my medications in their original container to the PROVIDER’s office while I am on controlled medication.     22. Refills generally will not be given over the phone, after office hours, during the weekends, and on holidays.     23. I  understand that any medical treatment is initially a trial, with the goal of treatment being to improve the quality of life and ability to function and/or work. These parameters will be assessed periodically to determine the benefits of continued therapy, and continued prescription is contingent on whether my physician believes that the medication usage benefits me. I will comply with all treatments as outlined by the PROVIDER.     24. I have been explained the risks and potential benefits of these therapies, including, but not limited to, psychological addiction, physical dependence, withdrawal and over dosage.     25. I understand that failure to adhere to these policies and/or failure to comply with the PROVIDER’S treatment plan may result in cessation of therapy with Controlled Substance prescribing by the PROVIDER or referral for further specialty assessment, as well as possible discharge from the PRACTICE.     26. I, the undersigned patient, attest that the foregoing was discussed with me, and that I have read, fully understand, and agree to all of the above requirements and instructions. I affirm that I have the full right and power to sign and be bound by this  Agreement.         Date:  __________________________________________    Time:  __________________________________________    Patient Printed Name:  _____________________________    Patient Signature:  ________________________________           Date:  __________________________________________    Time:  __________________________________________    Provider Signature:  _______________________________

## 2025-04-03 NOTE — TELEPHONE ENCOUNTER
[employer]    No coverage was found  No eligibility was found. Please reconfirm the member's health plan coverage before re-submitting.

## 2025-04-03 NOTE — PROGRESS NOTES
"Subjective   Lissette Rea is a 49 y.o. female.     History of Present Illness    Since the last visit, she has overall felt fairly well.  She has Primary Hypertension and well controlled on current medication, Hyperlipidemia and working on this with diet and exercise, Seasonal allergies and doing well on their medication , Asthma well controlled and not requiring rescue Albuterol > 2 times a week, and Vitamin D deficiency and labs are at goal >30 ng/mL.  she has been compliant with current medications have reviewed them.  The patient denies medication side effects.  Will refill medications. /88   Pulse 92   Temp 97.9 °F (36.6 °C) (Temporal)   Resp 16   Ht 157.5 cm (62.01\")   Wt 62.1 kg (137 lb)   LMP  (LMP Unknown)   SpO2 99%   BMI 25.05 kg/m² .        Lissette Rea 49 y.o. female who presents for ADD/ADHD follow up. They are well controlled on their current Rx.  No new problems with insomnia, tachycardia, chest pain, or weight loss. The patient has read and signed the [facility] Controlled Substance Contract.  I will continue to see patient for regular follow up appointments and give the lowest effective dose.  They are well controlled on their medication at the lowest effective dose.  She has a previous history of a diagnosis of  ADD/ADHD.   ASIF has been reviewed by me and is updated every 3 months. The patient is aware of the potential for addiction and dependence.  The Rx continues to help them concentrate, finish tasks in timely manor, and succeed.  She denies current suicidal and homicidal ideation.  Reviewed causes for potential of discontinuation of stimulant medication,  including but not limited to consideration for diversion, obtaining other controlled substances from other license practitioners, or  inappropriate office behavior.  Patient understands to use a controlled substance as prescribed by physician and to avoid improper use of controlled substances.  Patient will " also verbalized understanding that prescriptions to be filled at the same pharmacy  unless office staff is made aware of this.  Patient understands they can be submitted to random urine drug screens and/or random pill counts on any request.  Patient understands they are not to receive early refills.  The patient must produce an official police report for any effort to replace controlled substances that are lost or stolen.  No use of illegal street drugs while receiving controlled substances from this prescribing provider.  The patient is to take medication as prescribed  and not deviate from the normal schedule without consultation from the provider.  Patient is not to share the medication with others or to take medication with alcohol or other sedatives.  Use caution when driving or operating machinery.  Must always keep the prescription in the original container.  Patient is to store controlled substances in a locked cabinet or other secure storage unit that is cool, dry and out of sunlight.  Patient must immediately notify office if this controlled substances is  improperly taken by another individual.  Reviewed risk for physical dependence, tolerance and addiction.    There is no evidence of aberrant behavior or any red flags.  .   7-1-15 [facility]  Normal EKG 3/8/2016    Continues on Wellbutrin for anxiety and depression working well  PPI as needed for GERD works well  Results for orders placed or performed in visit on 07/19/23   Lipid panel    Collection Time: 10/06/23  8:51 AM    Specimen: Blood   Result Value Ref Range    Total Cholesterol 191 0 - 200 mg/dL    Triglycerides 54 0 - 150 mg/dL    HDL Cholesterol 85 (H) 40 - 60 mg/dL    VLDL Cholesterol Jose R 10 5 - 40 mg/dL    LDL Chol Calc (NIH) 96 0 - 100 mg/dL   TSH    Collection Time: 10/06/23  8:51 AM    Specimen: Blood   Result Value Ref Range    TSH 2.100 0.270 - 4.200 uIU/mL   Hemoglobin A1c    Collection Time: 10/06/23  8:51 AM    Specimen:  Blood   Result Value Ref Range    Hemoglobin A1C 5.10 4.80 - 5.60 %   T4, Free    Collection Time: 10/06/23  8:51 AM    Specimen: Blood   Result Value Ref Range    Free T4 1.05 0.93 - 1.70 ng/dL   Vitamin B12    Collection Time: 10/06/23  8:51 AM    Specimen: Blood   Result Value Ref Range    Vitamin B-12 343 211 - 946 pg/mL   Folate    Collection Time: 10/06/23  8:51 AM    Specimen: Blood   Result Value Ref Range    Folate 9.09 4.78 - 24.20 ng/mL   Vitamin D,25-Hydroxy    Collection Time: 10/06/23  8:51 AM    Specimen: Blood   Result Value Ref Range    25 Hydroxy, Vitamin D 35.5 30.0 - 100.0 ng/ml   Magnesium    Collection Time: 10/06/23  8:51 AM    Specimen: Blood   Result Value Ref Range    Magnesium 2.4 1.6 - 2.6 mg/dL   FSH & LH    Collection Time: 10/06/23  8:51 AM    Specimen: Blood   Result Value Ref Range    LH 9.3 mIU/mL    FSH 12.1 mIU/mL   Comprehensive metabolic panel    Collection Time: 10/06/23  8:51 AM    Specimen: Blood   Result Value Ref Range    Glucose 87 65 - 99 mg/dL    BUN 24 (H) 6 - 20 mg/dL    Creatinine 0.83 0.57 - 1.00 mg/dL    EGFR Result 86.5 >60.0 mL/min/1.73    BUN/Creatinine Ratio 28.9 (H) 7.0 - 25.0    Sodium 141 136 - 145 mmol/L    Potassium 4.7 3.5 - 5.2 mmol/L    Chloride 105 98 - 107 mmol/L    Total CO2 28.2 22.0 - 29.0 mmol/L    Calcium 9.2 8.6 - 10.5 mg/dL    Total Protein 6.8 6.0 - 8.5 g/dL    Albumin 4.4 3.5 - 5.2 g/dL    Globulin 2.4 gm/dL    A/G Ratio 1.8 g/dL    Total Bilirubin 0.3 0.0 - 1.2 mg/dL    Alkaline Phosphatase 66 39 - 117 U/L    AST (SGOT) 20 1 - 32 U/L    ALT (SGPT) 18 1 - 33 U/L   Microscopic Examination -    Collection Time: 10/06/23  8:51 AM   Result Value Ref Range    WBC, UA 0-2 /HPF    RBC, UA 0-2 /HPF    Epithelial Cells (non renal) 3-6 (A) /HPF    Cast Type Comment     Bacteria, UA Comment None Seen /HPF   CBC and Differential    Collection Time: 10/06/23  8:51 AM    Specimen: Blood   Result Value Ref Range    WBC 5.70 3.40 - 10.80 10*3/mm3    RBC 4.37  3.77 - 5.28 10*6/mm3    Hemoglobin 13.4 12.0 - 15.9 g/dL    Hematocrit 40.1 34.0 - 46.6 %    MCV 91.8 79.0 - 97.0 fL    MCH 30.7 26.6 - 33.0 pg    MCHC 33.4 31.5 - 35.7 g/dL    RDW 12.0 (L) 12.3 - 15.4 %    Platelets 375 140 - 450 10*3/mm3    Neutrophil Rel % 68.4 42.7 - 76.0 %    Lymphocyte Rel % 16.5 (L) 19.6 - 45.3 %    Monocyte Rel % 6.8 5.0 - 12.0 %    Eosinophil Rel % 7.2 (H) 0.3 - 6.2 %    Basophil Rel % 0.9 0.0 - 1.5 %    Neutrophils Absolute 3.90 1.70 - 7.00 10*3/mm3    Lymphocytes Absolute 0.94 0.70 - 3.10 10*3/mm3    Monocytes Absolute 0.39 0.10 - 0.90 10*3/mm3    Eosinophils Absolute 0.41 (H) 0.00 - 0.40 10*3/mm3    Basophils Absolute 0.05 0.00 - 0.20 10*3/mm3    Immature Granulocyte Rel % 0.2 0.0 - 0.5 %    Immature Grans Absolute 0.01 0.00 - 0.05 10*3/mm3    nRBC 0.0 0.0 - 0.2 /100 WBC   Urinalysis With Microscopic - Urine, Clean Catch    Collection Time: 10/06/23  8:51 AM    Specimen: Urine, Clean Catch   Result Value Ref Range    Specific Gravity, UA 1.016 1.005 - 1.030    pH, UA 6.5 5.0 - 8.0    Color, UA Yellow     Appearance, UA Clear Clear    Leukocytes, UA Negative Negative    Protein Negative Negative    Glucose, UA Negative Negative    Ketones Negative Negative    Blood, UA Negative Negative    Bilirubin, UA Negative Negative    Urobilinogen, UA Comment     Nitrite, UA Negative Negative         The following portions of the patient's history were reviewed and updated as appropriate: allergies, current medications, past family history, past medical history, past social history, past surgical history, and problem list.    Review of Systems    Objective   Physical Exam      Assessment & Plan   Diagnoses and all orders for this visit:    1. Attention deficit hyperactivity disorder (ADHD), predominantly inattentive type (Primary)  -     ToxAssure Flex 23, Ur -    2. Essential hypertension  -     Semaglutide-Weight Management (Wegovy) 0.5 MG/0.5ML solution auto-injector; Inject 0.5 mL under the skin  into the appropriate area as directed 1 (One) Time Per Week. 0.5mg SC weekly for E66.811  Dispense: 2 mL; Refill: 11    3. Vitamin D deficiency    4. Low serum vitamin B12    5. Recurrent major depressive disorder, in full remission    6. Gastroesophageal reflux disease without esophagitis  -     Semaglutide-Weight Management (Wegovy) 0.5 MG/0.5ML solution auto-injector; Inject 0.5 mL under the skin into the appropriate area as directed 1 (One) Time Per Week. 0.5mg SC weekly for E66.811  Dispense: 2 mL; Refill: 11    7. Moderate persistent asthma without complication  -     Semaglutide-Weight Management (Wegovy) 0.5 MG/0.5ML solution auto-injector; Inject 0.5 mL under the skin into the appropriate area as directed 1 (One) Time Per Week. 0.5mg SC weekly for E66.811  Dispense: 2 mL; Refill: 11    8. Class 1 obesity due to excess calories with serious comorbidity and body mass index (BMI) of 30.0 to 30.9 in adult  -     Semaglutide-Weight Management (Wegovy) 0.5 MG/0.5ML solution auto-injector; Inject 0.5 mL under the skin into the appropriate area as directed 1 (One) Time Per Week. 0.5mg SC weekly for E66.811  Dispense: 2 mL; Refill: 11    9. Chronic allergic rhinitis    10. Current moderate episode of major depressive disorder, unspecified whether recurrent    11. Hyperlipidemia, mixed    Plan, Lissette Rea, was seen today.  she was seen for HTN and continue medication, Hyperlipidemia and will work on this with diet and exercise, Seasonal allergies and is doing well on their medication , Asthma and is well controlled on medication.  , and Vitamin D deficiency and supplemented.  Her history of class I obesity with the comorbidities of primary hypertension, mixed hyperlipidemia, GERD, moderate persistent asthma--- she is exercising at least 250 minutes/week and she is on low-carb low-fat diet and doing well on semaglutide I recommend continuing it  Updating controlled substance contract today and urine drug  screen  So on Detrol LA for urinary urgency works well  Continue Wellbutrin for anxiety and depression working well  Continue Adderall for ADHD working well  PPI as needed for GERD works well

## 2025-04-09 ENCOUNTER — RESULTS FOLLOW-UP (OUTPATIENT)
Dept: FAMILY MEDICINE CLINIC | Facility: CLINIC | Age: 50
End: 2025-04-09
Payer: COMMERCIAL

## 2025-04-09 LAB
1OH-MIDAZOLAM UR QL SCN: NOT DETECTED NG/MG CREAT
6MAM UR QL SCN: NEGATIVE NG/ML
7AMINOCLONAZEPAM/CREAT UR: NOT DETECTED NG/MG CREAT
A-OH ALPRAZ/CREAT UR: NOT DETECTED NG/MG CREAT
A-OH-TRIAZOLAM/CREAT UR CFM: NOT DETECTED NG/MG CREAT
ACP UR QL CFM: NOT DETECTED
ALPRAZ/CREAT UR CFM: NOT DETECTED NG/MG CREAT
AMPHET UR CFM-MCNC: >9901 NG/MG CREAT
AMPHETAMINES UR QL SCN: NORMAL NG/ML
AMPHETAMINES UR QL: NORMAL
APAP UR QL SCN: NEGATIVE UG/ML
BARBITURATES UR QL SCN: NEGATIVE NG/ML
BENZODIAZ SCN METH UR: NEGATIVE
BUPRENORPHINE UR QL SCN: NEGATIVE
BUPRENORPHINE/CREAT UR: NOT DETECTED NG/MG CREAT
CANNABINOIDS UR QL SCN: NEGATIVE NG/ML
CARISOPRODOL UR QL: NEGATIVE NG/ML
CLONAZEPAM/CREAT UR CFM: NOT DETECTED NG/MG CREAT
COCAINE+BZE UR QL SCN: NEGATIVE NG/ML
CREAT UR-MCNC: 101 MG/DL
D-METHORPHAN UR-MCNC: NOT DETECTED NG/ML
D-METHORPHAN+LEVORPHANOL UR QL: NOT DETECTED
DESALKYLFLURAZ/CREAT UR: NOT DETECTED NG/MG CREAT
DIAZEPAM/CREAT UR: NOT DETECTED NG/MG CREAT
ETHANOL UR QL SCN: NEGATIVE G/DL
ETHANOL UR QL SCN: NEGATIVE NG/ML
FENTANYL CTO UR SCN-MCNC: NEGATIVE NG/ML
FENTANYL/CREAT UR: NOT DETECTED NG/MG CREAT
FLUNITRAZEPAM UR QL SCN: NOT DETECTED NG/MG CREAT
GABAPENTIN UR-MCNC: NEGATIVE UG/ML
HALLUCINOGENS UR: NEGATIVE
HYPNOTICS UR QL SCN: NEGATIVE
KETAMINE UR QL: NOT DETECTED
LORAZEPAM/CREAT UR: NOT DETECTED NG/MG CREAT
MDA UR CFM-MCNC: NOT DETECTED NG/MG CREAT
MDMA UR CFM-MCNC: NOT DETECTED NG/MG CREAT
MEPERIDINE UR QL SCN: NEGATIVE NG/ML
METHADONE UR QL SCN: NEGATIVE NG/ML
METHADONE+METAB UR QL SCN: NEGATIVE NG/ML
METHAMPHET UR CFM-MCNC: NOT DETECTED NG/MG CREAT
MIDAZOLAM/CREAT UR CFM: NOT DETECTED NG/MG CREAT
MISCELLANEOUS, UR: NEGATIVE
NORBUPRENORPHINE/CREAT UR: NOT DETECTED NG/MG CREAT
NORDIAZEPAM/CREAT UR: NOT DETECTED NG/MG CREAT
NORFENTANYL/CREAT UR: NOT DETECTED NG/MG CREAT
NORFLUNITRAZEPAM UR-MCNC: NOT DETECTED NG/MG CREAT
NORKETAMINE UR-MCNC: NOT DETECTED UG/ML
OPIATES UR SCN-MCNC: NEGATIVE NG/ML
OXAZEPAM/CREAT UR: NOT DETECTED NG/MG CREAT
OXYCODONE CTO UR SCN-MCNC: NEGATIVE NG/ML
PCP UR QL SCN: NEGATIVE NG/ML
PRESCRIBED MEDICATIONS: NORMAL
PROPOXYPH UR QL SCN: NEGATIVE NG/ML
TAPENTADOL CTO UR SCN-MCNC: NEGATIVE NG/ML
TEMAZEPAM/CREAT UR: NOT DETECTED NG/MG CREAT
TRAMADOL UR QL SCN: NEGATIVE NG/ML
ZALEPLON UR-MCNC: NOT DETECTED NG/ML
ZOLPIDEM PHENYL-4-CARB UR QL SCN: NOT DETECTED
ZOLPIDEM UR QL SCN: NOT DETECTED
ZOPICLONE-N-OXIDE UR-MCNC: NOT DETECTED NG/ML

## 2025-04-09 NOTE — LETTER
Lissette HUNTER Obthomas  [address]  [place] [address]    April 10, 2025     Dear Ms. Rea:    Below are the results from your recent visit:Patient is on Adderall. Drug screen okay     Resulted Orders   ToxAssure Flex 23, Ur -   Result Value Ref Range    Report Summary FINAL       Comment:      ====================================================================  Amphetamines, MS, Ur RFX  ToxAssure Flex 23, Ur  ====================================================================  Test                             Result       Flag       Units  Drug Present    Amphetamine                    >9901                   ng/mg creat     Amphetamine is available as a schedule II prescription drug.  ====================================================================  Test                      Result    Flag   Units      Ref Range    Creatinine              101              mg/dL      >=20  ====================================================================  Declared Medications:   Medication list was not provided.  ====================================================================  For clinical consultation, please call [phone].  ====================================================================      CREATININE 101 mg/dL      Comment:      REFERENCE RANGE: Ref Range>=20    Amphetamines, IA Comment CUTOFF:300 ng/mL      Comment:      Further testing indicated    Benzodiazepines Negative     Diazepam Urine, Qualitative Not Detected ng/mg creat    Desmethyldiazepam Not Detected ng/mg creat    Oxazepam, urine Not Detected ng/mg creat    Temazepam Not Detected ng/mg creat      Comment:      Expected metabolism of benzodiazepine class drugs:   Parent Drug       Detected Metabolites   -----------       --------------------   Diazepam:         Desmethyldiazepam, Temazepam, Oxazepam   Chlordiazepoxide: Desmethyldiazepam, Oxazepam   Clorazepate:      Desmethyldiazepam, Oxazepam   Halazepam:        Desmethyldiazepam,  Oxazepam   Temazepam:        Oxazepam   Oxazepam:         None      Alprazolam Urine, Conf Not Detected ng/mg creat    Alpha-hydroxyalprazolam, Urine Not Detected ng/mg creat    Desalkylflurazepam, Urine Not Detected ng/mg creat    Lorazepam, Urine Not Detected ng/mg creat    Alpha-hydroxytriazolam, Urine Not Detected ng/mg creat    Clonazepam Not Detected ng/mg creat    7- AMINOCLONAZEPAM Not Detected ng/mg creat    Midazolam, Urine Not Detected ng/mg creat    Alpha-hydroxymidazolam, Urine Not Detected ng/mg creat    Flunitrazepam Not Detected ng/mg creat    DESMETHYLFLUNITRAZEPAM Not Detected ng/mg creat    COCAINE / METABOLITE, IA Negative CUTOFF:150 ng/mL    Ethyl Alcohol, Enz Negative CUTOFF:0.020 g/dL    Ethanol and Ethanol Biomarkers Negative CUTOFF:500 ng/mL    Cannavinoids IA Negative CUTOFF:20 ng/mL    6-Acetylmorphine IA Negative CUTOFF:10 ng/mL    Opiate Class IA Negative CUTOFF:100 ng/mL    Oxycodone Class IA Negative CUTOFF:100 ng/mL    METHADONE, IA Negative CUTOFF:100 ng/mL    Methadone MTB IA Negative CUTOFF:100 ng/mL    Buprenorphine, Urine Negative     Buprenorphine, Urine Not Detected ng/mg creat    Norbuprenorphine Not Detected ng/mg creat    Fentanyl Negative     Fentanyl, Urine Not Detected ng/mg creat    Norfentanyl Urine Not Detected ng/mg creat    Tapentadol, IA Negative CUTOFF:200 ng/mL    MEPERIDINE, IA Negative CUTOFF:200 ng/mL    PROPOXYPHENE, IA Negative CUTOFF:300 ng/mL    TRAMADOL, IA Negative CUTOFF:200 ng/mL    Barbiturates, IA Negative CUTOFF:200 ng/mL    Other Hallucinogens Ur Negative     Ketamine Not Detected     Norketamine Not Detected     PHENCYCLIDINE, IA Negative CUTOFF:25 ng/mL    Gabapentin, IA Negative CUTOFF:1.0 ug/mL    Carisoprodol, IA Negative CUTOFF:100 ng/mL    SEDATIVES/HYPNOTICS Negative     Zolpidem(Ambien) Urine Not Detected     Zolpidem Acid Not Detected     Eszopiclone/Zopiclone Not Detected     Amino Chloropyridine Not Detected     Zaleplon, Ur Not Detected        Comment:      Expected metabolism of Sedatives/Hypnotics:   Parent Drug                 Detected Metabolites   -----------                 --------------------   Zolpidem:                   Zolpidem Acid   Zopiclone/Eszopiclone:      Amino Chloropyridine   Zaleplon:                   None      Acetaminophen, IA Negative CUTOFF:5.0 ug/mL    Miscellaneous, Ur Negative     DEXTROMETHORPHAN Not Detected     Dextrorphan/Levorphanol Not Detected       Comment:      Expected metabolism of Dextromethorphan and  Dextrorphan/Levorphanol:   Parent Drug                 Detected Metabolites   -----------                 --------------------   Dextromethorphan:           Dextrorphan   Dextrorphan/Levorphanol:    None    Dextrophan cannot be distinguished from Levorphanol    by the method used for analysis.     Amphetamines, MS, Ur RFX -   Result Value Ref Range    Amphetamines Confirmation +POSITIVE+     METHAMPHETAMINE Not Detected ng/mg creat    AMPHETAMINE >9901 ng/mg creat    MDMA-Ecstasy Not Detected ng/mg creat    MDA Not Detected ng/mg creat       .    If you have any questions or concerns, please don't hesitate to call.         Sincerely,        Trang Doherty PA-C

## 2025-05-06 ENCOUNTER — PATIENT MESSAGE (OUTPATIENT)
Dept: FAMILY MEDICINE CLINIC | Facility: CLINIC | Age: 50
End: 2025-05-06
Payer: COMMERCIAL

## 2025-05-06 DIAGNOSIS — F90.0 ATTENTION DEFICIT HYPERACTIVITY DISORDER (ADHD), PREDOMINANTLY INATTENTIVE TYPE: ICD-10-CM

## 2025-05-06 RX ORDER — DEXTROAMPHETAMINE SACCHARATE, AMPHETAMINE ASPARTATE, DEXTROAMPHETAMINE SULFATE AND AMPHETAMINE SULFATE 7.5; 7.5; 7.5; 7.5 MG/1; MG/1; MG/1; MG/1
30 TABLET ORAL 2 TIMES DAILY
Qty: 60 TABLET | Refills: 0 | Status: SHIPPED | OUTPATIENT
Start: 2025-05-06

## 2025-05-15 ENCOUNTER — TELEPHONE (OUTPATIENT)
Dept: FAMILY MEDICINE CLINIC | Facility: CLINIC | Age: 50
End: 2025-05-15

## 2025-05-15 NOTE — TELEPHONE ENCOUNTER
"  Caller: Lissette Rea \"Arina\"    Relationship to patient: Self      Best call back number: [phone]     Provider: JEET CHRISTIAN     Medication PA needed:     amphetamine-dextroamphetamine (Adderall) 30 MG tablet       Reason for call/Prior Auth: PATIENT CALLED STATED THE PRIOR AUTH HAD  2025 AND NEED NEW PRIOR AUTH AND IF JEET CHRISTIAN CAN CONTACT HER [facility] FOR PRIOR AUTH.           "

## 2025-05-16 ENCOUNTER — PRIOR AUTHORIZATION (OUTPATIENT)
Dept: FAMILY MEDICINE CLINIC | Facility: CLINIC | Age: 50
End: 2025-05-16
Payer: COMMERCIAL

## 2025-05-16 NOTE — TELEPHONE ENCOUNTER
MORALES OLSON (Simon: [id number])  PA Case ID #: [id number]  Need Help? Call us at [phone]  Outcome  Approved today by [facility] 2017  Your PA request has been approved. Additional information will be provided in the approval communication. (Message 1142)  Effective Date: 5/16/2025  Authorization Expiration Date: 5/16/2026

## 2025-06-04 ENCOUNTER — TELEPHONE (OUTPATIENT)
Dept: FAMILY MEDICINE CLINIC | Facility: CLINIC | Age: 50
End: 2025-06-04
Payer: COMMERCIAL

## 2025-06-04 NOTE — TELEPHONE ENCOUNTER
Needed clarification if she was under spouse insurance because according to Pa her insurance has no coverage.

## 2025-06-06 ENCOUNTER — PRIOR AUTHORIZATION (OUTPATIENT)
Dept: FAMILY MEDICINE CLINIC | Facility: CLINIC | Age: 50
End: 2025-06-06
Payer: COMMERCIAL

## 2025-06-06 NOTE — TELEPHONE ENCOUNTER
MORALES OLSON (Simon: [id number])  Rx #: [id number]  Wegovy 0.5MG/0.5ML auto-injectors  Form  [facility] Electronic PA Form (2017 [employer])  Created  9 days ago  Sent to Plan  1 hour ago  Plan Response  1 hour ago  Submit Clinical Questions  1 minute ago  Determination  Wait for Determination  [facility].

## 2025-06-13 ENCOUNTER — PATIENT MESSAGE (OUTPATIENT)
Dept: FAMILY MEDICINE CLINIC | Facility: CLINIC | Age: 50
End: 2025-06-13
Payer: COMMERCIAL

## 2025-06-13 DIAGNOSIS — J45.40 MODERATE PERSISTENT ASTHMA WITHOUT COMPLICATION: ICD-10-CM

## 2025-06-13 DIAGNOSIS — E66.09 CLASS 1 OBESITY DUE TO EXCESS CALORIES WITH SERIOUS COMORBIDITY AND BODY MASS INDEX (BMI) OF 30.0 TO 30.9 IN ADULT: ICD-10-CM

## 2025-06-13 DIAGNOSIS — E66.811 CLASS 1 OBESITY DUE TO EXCESS CALORIES WITH SERIOUS COMORBIDITY AND BODY MASS INDEX (BMI) OF 30.0 TO 30.9 IN ADULT: ICD-10-CM

## 2025-06-13 DIAGNOSIS — K21.9 GASTROESOPHAGEAL REFLUX DISEASE WITHOUT ESOPHAGITIS: ICD-10-CM

## 2025-06-13 DIAGNOSIS — I10 ESSENTIAL HYPERTENSION: ICD-10-CM

## 2025-06-13 RX ORDER — SEMAGLUTIDE 0.5 MG/.5ML
0.5 INJECTION, SOLUTION SUBCUTANEOUS WEEKLY
Qty: 2 ML | Refills: 11 | Status: SHIPPED | OUTPATIENT
Start: 2025-06-13

## 2025-06-14 ENCOUNTER — PATIENT MESSAGE (OUTPATIENT)
Dept: FAMILY MEDICINE CLINIC | Facility: CLINIC | Age: 50
End: 2025-06-14
Payer: COMMERCIAL

## 2025-06-14 DIAGNOSIS — F90.0 ATTENTION DEFICIT HYPERACTIVITY DISORDER (ADHD), PREDOMINANTLY INATTENTIVE TYPE: ICD-10-CM

## 2025-06-14 RX ORDER — DEXTROAMPHETAMINE SACCHARATE, AMPHETAMINE ASPARTATE, DEXTROAMPHETAMINE SULFATE AND AMPHETAMINE SULFATE 7.5; 7.5; 7.5; 7.5 MG/1; MG/1; MG/1; MG/1
30 TABLET ORAL 2 TIMES DAILY
Qty: 60 TABLET | Refills: 0 | Status: SHIPPED | OUTPATIENT
Start: 2025-06-14

## 2025-06-17 ENCOUNTER — APPOINTMENT (OUTPATIENT)
Dept: WOMENS IMAGING | Facility: HOSPITAL | Age: 50
End: 2025-06-17
Payer: COMMERCIAL

## 2025-06-17 PROCEDURE — 77067 SCR MAMMO BI INCL CAD: CPT | Performed by: RADIOLOGY

## 2025-06-17 PROCEDURE — 77063 BREAST TOMOSYNTHESIS BI: CPT | Performed by: RADIOLOGY

## 2025-07-01 ENCOUNTER — OFFICE VISIT (OUTPATIENT)
Dept: FAMILY MEDICINE CLINIC | Facility: CLINIC | Age: 50
End: 2025-07-01
Payer: COMMERCIAL

## 2025-07-01 VITALS
BODY MASS INDEX: 25.03 KG/M2 | OXYGEN SATURATION: 96 % | RESPIRATION RATE: 16 BRPM | TEMPERATURE: 97.5 F | HEIGHT: 62 IN | WEIGHT: 136 LBS | DIASTOLIC BLOOD PRESSURE: 76 MMHG | SYSTOLIC BLOOD PRESSURE: 124 MMHG | HEART RATE: 96 BPM

## 2025-07-01 DIAGNOSIS — E55.9 VITAMIN D DEFICIENCY: ICD-10-CM

## 2025-07-01 DIAGNOSIS — F33.42 RECURRENT MAJOR DEPRESSIVE DISORDER, IN FULL REMISSION: ICD-10-CM

## 2025-07-01 DIAGNOSIS — K21.9 GASTROESOPHAGEAL REFLUX DISEASE WITHOUT ESOPHAGITIS: ICD-10-CM

## 2025-07-01 DIAGNOSIS — I10 ESSENTIAL HYPERTENSION: Primary | ICD-10-CM

## 2025-07-01 DIAGNOSIS — E66.09 CLASS 1 OBESITY DUE TO EXCESS CALORIES WITH SERIOUS COMORBIDITY AND BODY MASS INDEX (BMI) OF 30.0 TO 30.9 IN ADULT: ICD-10-CM

## 2025-07-01 DIAGNOSIS — K58.2 IRRITABLE BOWEL SYNDROME WITH BOTH CONSTIPATION AND DIARRHEA: ICD-10-CM

## 2025-07-01 DIAGNOSIS — F41.1 GENERALIZED ANXIETY DISORDER: ICD-10-CM

## 2025-07-01 DIAGNOSIS — F90.0 ATTENTION DEFICIT HYPERACTIVITY DISORDER (ADHD), PREDOMINANTLY INATTENTIVE TYPE: ICD-10-CM

## 2025-07-01 DIAGNOSIS — E53.8 LOW SERUM VITAMIN B12: ICD-10-CM

## 2025-07-01 DIAGNOSIS — J45.30 MILD PERSISTENT ASTHMA WITHOUT COMPLICATION: ICD-10-CM

## 2025-07-01 DIAGNOSIS — E66.811 CLASS 1 OBESITY DUE TO EXCESS CALORIES WITH SERIOUS COMORBIDITY AND BODY MASS INDEX (BMI) OF 30.0 TO 30.9 IN ADULT: ICD-10-CM

## 2025-07-01 DIAGNOSIS — Z12.11 SCREEN FOR COLON CANCER: ICD-10-CM

## 2025-07-01 PROCEDURE — 99214 OFFICE O/P EST MOD 30 MIN: CPT | Performed by: PHYSICIAN ASSISTANT

## 2025-07-01 RX ORDER — BUPROPION HYDROCHLORIDE 300 MG/1
300 TABLET ORAL EVERY MORNING
Qty: 90 TABLET | Refills: 3 | Status: SHIPPED | OUTPATIENT
Start: 2025-07-01 | End: 2025-07-01 | Stop reason: SDUPTHER

## 2025-07-01 RX ORDER — VILAZODONE HYDROCHLORIDE 20 MG/1
TABLET ORAL
Qty: 30 TABLET | Refills: 1 | Status: SHIPPED | OUTPATIENT
Start: 2025-07-01

## 2025-07-01 RX ORDER — VILAZODONE HYDROCHLORIDE 20 MG/1
TABLET ORAL
Qty: 30 TABLET | Refills: 1 | Status: SHIPPED | OUTPATIENT
Start: 2025-07-01 | End: 2025-07-01 | Stop reason: SDUPTHER

## 2025-07-01 RX ORDER — DICYCLOMINE HYDROCHLORIDE 10 MG/1
10 CAPSULE ORAL
Qty: 30 CAPSULE | Refills: 1 | Status: SHIPPED | OUTPATIENT
Start: 2025-07-01

## 2025-07-01 RX ORDER — BUPROPION HYDROCHLORIDE 300 MG/1
300 TABLET ORAL EVERY MORNING
Qty: 90 TABLET | Refills: 3 | Status: SHIPPED | OUTPATIENT
Start: 2025-07-01

## 2025-07-01 NOTE — PROGRESS NOTES
"Subjective   Lissette Rea is a 50 y.o. female.     History of Present Illness    Since the last visit, she has overall felt anxious.  She has Primary Hypertension and well controlled on current medication, GERD controlled on PPI Rx, Asthma well controlled and not requiring rescue Albuterol > 2 times a week, and Vitamin D deficiency and labs are at goal >30 ng/mL.  she has been compliant with current medications have reviewed them.  The patient denies medication side effects.  Will refill medications. /76   Pulse 96   Temp 97.5 °F (36.4 °C) (Temporal)   Resp 16   Ht 157.5 cm (62.01\")   Wt 61.7 kg (136 lb)   LMP  (LMP Unknown)   SpO2 96%   BMI 24.87 kg/m² .  BMI is within normal parameters. No other follow-up for BMI required.  Weight is down  She has been taking the semaglutide compound  She has had some recent constipation and irritable bowel loose stools but not diarrhea... Still some cramping today I can send Bentyl to pharmacy  Hold semaglutide injection until her bowels settle down  Lissette Rea female 50 y.o., /76   Pulse 96   Temp 97.5 °F (36.4 °C) (Temporal)   Resp 16   Ht 157.5 cm (62.01\")   Wt 61.7 kg (136 lb)   LMP  (LMP Unknown)   SpO2 96%   BMI 24.87 kg/m²   who presents today for follow up of Depression and Anxiety.  She reports overall depression is controlled on the Wellbutrin she is having breakthrough anxiety and feels more irritable... She has more worry. Onset of symptoms was approximately several months ago. She denies current suicidal and homicidal ideation. Risk factors are family history of anxiety and or depression and lifestyle of multiple roles.  Previous treatment includes currently on Wellbutrin and has been on Zoloft in past but had weight gain did best on Viibryd and then insurance quit covering it were discussing restarting it and see if it is back on her insurance plan now with that it is generic. She complains of the following medication side " effects:weight gain. The patient declines to go to counseling..    Results for orders placed or performed in visit on 04/03/25   [employer] Flex 23, Ur -    Collection Time: 04/03/25  8:52 AM    Specimen: Urine   Result Value Ref Range    Report Summary FINAL     CREATININE 101 mg/dL    Amphetamines, IA Comment CUTOFF:300 ng/mL    Benzodiazepines Negative     Diazepam Urine, Qualitative Not Detected ng/mg creat    Desmethyldiazepam Not Detected ng/mg creat    Oxazepam, urine Not Detected ng/mg creat    Temazepam Not Detected ng/mg creat    Alprazolam Urine, Conf Not Detected ng/mg creat    Alpha-hydroxyalprazolam, Urine Not Detected ng/mg creat    Desalkylflurazepam, Urine Not Detected ng/mg creat    Lorazepam, Urine Not Detected ng/mg creat    Alpha-hydroxytriazolam, Urine Not Detected ng/mg creat    Clonazepam Not Detected ng/mg creat    7- AMINOCLONAZEPAM Not Detected ng/mg creat    Midazolam, Urine Not Detected ng/mg creat    Alpha-hydroxymidazolam, Urine Not Detected ng/mg creat    Flunitrazepam Not Detected ng/mg creat    DESMETHYLFLUNITRAZEPAM Not Detected ng/mg creat    COCAINE / METABOLITE, IA Negative CUTOFF:150 ng/mL    Ethyl Alcohol, Enz Negative CUTOFF:0.020 g/dL    Ethanol and Ethanol Biomarkers Negative CUTOFF:500 ng/mL    Cannavinoids IA Negative CUTOFF:20 ng/mL    6-Acetylmorphine IA Negative CUTOFF:10 ng/mL    Opiate Class IA Negative CUTOFF:100 ng/mL    Oxycodone Class IA Negative CUTOFF:100 ng/mL    METHADONE, IA Negative CUTOFF:100 ng/mL    Methadone MTB IA Negative CUTOFF:100 ng/mL    Buprenorphine, Urine Negative     Buprenorphine, Urine Not Detected ng/mg creat    Norbuprenorphine Not Detected ng/mg creat    Fentanyl Negative     Fentanyl, Urine Not Detected ng/mg creat    Norfentanyl Urine Not Detected ng/mg creat    Tapentadol, IA Negative CUTOFF:200 ng/mL    MEPERIDINE, IA Negative CUTOFF:200 ng/mL    PROPOXYPHENE, IA Negative CUTOFF:300 ng/mL    TRAMADOL, IA Negative CUTOFF:200 ng/mL     Barbiturates, IA Negative CUTOFF:200 ng/mL    Other Hallucinogens Ur Negative     Ketamine Not Detected     Norketamine Not Detected     PHENCYCLIDINE, IA Negative CUTOFF:25 ng/mL    Gabapentin, IA Negative CUTOFF:1.0 ug/mL    Carisoprodol, IA Negative CUTOFF:100 ng/mL    SEDATIVES/HYPNOTICS Negative     Zolpidem(Ambien) Urine Not Detected     Zolpidem Acid Not Detected     Eszopiclone/Zopiclone Not Detected     Amino Chloropyridine Not Detected     Zaleplon, Ur Not Detected     Acetaminophen, IA Negative CUTOFF:5.0 ug/mL    Miscellaneous, Ur Negative     DEXTROMETHORPHAN Not Detected     Dextrorphan/Levorphanol Not Detected    Amphetamines, MS, Ur RFX -    Collection Time: 04/03/25  8:52 AM   Result Value Ref Range    Amphetamines Confirmation +POSITIVE+     METHAMPHETAMINE Not Detected ng/mg creat    AMPHETAMINE >9901 ng/mg creat    MDMA-Ecstasy Not Detected ng/mg creat    MDA Not Detected ng/mg creat     Lab Results   Component Value Date    GLUCOSE 87 10/06/2023    BUN 24 (H) 10/06/2023    CREATININE 0.83 10/06/2023     10/06/2023    K 4.7 10/06/2023     10/06/2023    CALCIUM 9.2 10/06/2023    PROTEINTOT 6.8 10/06/2023    ALBUMIN 4.4 10/06/2023    ALT 18 10/06/2023    AST 20 10/06/2023    ALKPHOS 66 10/06/2023    BILITOT 0.3 10/06/2023    GLOB 2.4 10/06/2023    AGRATIO 1.8 10/06/2023    BCR 28.9 (H) 10/06/2023    ANIONGAP 11.1 06/30/2017    EGFR 86.5 10/06/2023     Lab Results   Component Value Date    WBC 5.70 10/06/2023    HGB 13.4 10/06/2023    HCT 40.1 10/06/2023    MCV 91.8 10/06/2023     10/06/2023       Recent UTI and is on Macrobid----telehealth  Hyst  Lissette HUNTER Boogiejermanon 50 y.o. female who presents for ADD/ADHD follow up. They are well controlled on their current Rx.  No new problems with insomnia, tachycardia, chest pain, or unexplained weight loss. The patient has read and signed the [facility] Controlled Substance Contract.  I will continue to see patient for regular follow up  appointments and give the lowest effective dose.  They are well controlled on their medication at the lowest effective dose.  She has a previous history of a diagnosis of  ADD/ADHD.   ASIF has been reviewed by me and is updated every 3 months. The patient is aware of the potential for addiction and dependence.  The Rx continues to help them concentrate, finish tasks in timely manor, and succeed.  She denies current suicidal and homicidal ideation.  Reviewed causes for potential of discontinuation of stimulant medication,  including but not limited to consideration for diversion, obtaining other controlled substances from other license practitioners, or  inappropriate office behavior.  Patient understands to use a controlled substance as prescribed by physician and to avoid improper use of controlled substances.  Patient will also verbalized understanding that prescriptions to be filled at the same pharmacy  unless office staff is made aware of this.  Patient understands they can be submitted to random urine drug screens and/or random pill counts on any request.  Patient understands they are not to receive early refills.  The patient must produce an official police report for any effort to replace controlled substances that are lost or stolen.  No use of illegal street drugs while receiving controlled substances from this prescribing provider.  The patient is to take medication as prescribed  and not deviate from the normal schedule without consultation from the provider.  Patient is not to share the medication with others or to take medication with alcohol or other sedatives.  Use caution when driving or operating machinery.  Must always keep the prescription in the original container.  Patient is to store controlled substances in a locked cabinet or other secure storage unit that is cool, dry and out of sunlight.  Patient must immediately notify office if this controlled substances is  improperly taken by another  individual.  Reviewed risk for physical dependence, tolerance and addiction.    There is no evidence of aberrant behavior or any red flags.  .   7-1-15 [facility] eval  Normal EKG 3/8/2016  Negative drug screen 4-3-25  The following portions of the patient's history were reviewed and updated as appropriate: allergies, current medications, past family history, past medical history, past social history, past surgical history, and problem list.    Review of Systems   Constitutional:  Negative for diaphoresis.   HENT:  Negative for nosebleeds and trouble swallowing.    Eyes:  Negative for blurred vision and visual disturbance.   Respiratory:  Negative for choking.    Gastrointestinal:  Positive for abdominal pain. Negative for blood in stool.   Allergic/Immunologic: Negative for immunocompromised state.   Neurological:  Positive for dizziness. Negative for facial asymmetry and speech difficulty.   Psychiatric/Behavioral:  Negative for self-injury and suicidal ideas.        Objective   Physical Exam  Vitals and nursing note reviewed.   Constitutional:       General: She is not in acute distress.     Appearance: Normal appearance. She is well-developed. She is not ill-appearing or toxic-appearing.   HENT:      Head: Normocephalic.      Right Ear: External ear normal.      Left Ear: External ear normal.      Nose: Nose normal.      Mouth/Throat:      Pharynx: Oropharynx is clear.   Eyes:      General: No scleral icterus.     Conjunctiva/sclera: Conjunctivae normal.      Pupils: Pupils are equal, round, and reactive to light.   Neck:      Thyroid: No thyromegaly.   Cardiovascular:      Rate and Rhythm: Normal rate and regular rhythm.      Heart sounds: Normal heart sounds. No murmur heard.  Pulmonary:      Effort: Pulmonary effort is normal. No respiratory distress.      Breath sounds: Normal breath sounds.   Musculoskeletal:         General: No deformity. Normal range of motion.      Cervical back: Normal range of  motion and neck supple.   Skin:     General: Skin is warm and dry.      Findings: No rash.   Neurological:      General: No focal deficit present.      Mental Status: She is alert and oriented to person, place, and time. Mental status is at baseline.   Psychiatric:         Mood and Affect: Mood normal.         Behavior: Behavior normal.         Thought Content: Thought content normal.         Judgment: Judgment normal.           Assessment & Plan   Diagnoses and all orders for this visit:    1. Essential hypertension (Primary)    2. Attention deficit hyperactivity disorder (ADHD), predominantly inattentive type    3. Class 1 obesity due to excess calories with serious comorbidity and body mass index (BMI) of 30.0 to 30.9 in adult    4. Low serum vitamin B12    5. Gastroesophageal reflux disease without esophagitis    6. Vitamin D deficiency    7. Recurrent major depressive disorder, in full remission    8. Mild persistent asthma without complication    9. Generalized anxiety disorder    10. Hyperlipidemia, mixed    11. Screen for colon cancer  -     Cologuard - Stool, Per Rectum; Future    12. Irritable bowel syndrome with both constipation and diarrhea    Other orders  -     vilazodone (VIIBRYD) 20 MG tablet tablet; Start with 1/2 tab PO daily for 4 days then one po daily for stress  Dispense: 30 tablet; Refill: 1  -     dicyclomine (BENTYL) 10 MG capsule; Take 1 capsule by mouth 4 (Four) Times a Day Before Meals & at Bedtime.  Dispense: 30 capsule; Refill: 1        Will send Bentyl for the irritable bowel cramping and have her hold the semaglutide dose until her bowels settle down  She is on the semaglutide compound for treatment of obesity  She is losing weight on the semaglutide and blood pressure was at goal today but I do need her to monitor her blood pressure we may need to lower the dose of amlodipine as she loses weight  Continue Adderall for ADD works well  Still on Wellbutrin   Will message me when  Adderall is due for her ADD working well  Plan, Lissette Rea, was seen today.  she was seen for HTN and continue medication, GERD and will continue on PPI medication, Asthma and is well controlled on medication.  , and Vitamin D deficiency and supplemented.  Will update colon cancer screening by ordering Cologuard

## 2025-07-08 ENCOUNTER — OFFICE VISIT (OUTPATIENT)
Dept: FAMILY MEDICINE CLINIC | Facility: CLINIC | Age: 50
End: 2025-07-08
Payer: COMMERCIAL

## 2025-07-08 VITALS
WEIGHT: 139 LBS | TEMPERATURE: 97 F | HEART RATE: 98 BPM | SYSTOLIC BLOOD PRESSURE: 146 MMHG | BODY MASS INDEX: 25.58 KG/M2 | HEIGHT: 62 IN | DIASTOLIC BLOOD PRESSURE: 90 MMHG | OXYGEN SATURATION: 99 %

## 2025-07-08 DIAGNOSIS — R23.2 HOT FLASHES: ICD-10-CM

## 2025-07-08 DIAGNOSIS — F33.42 RECURRENT MAJOR DEPRESSIVE DISORDER, IN FULL REMISSION: ICD-10-CM

## 2025-07-08 DIAGNOSIS — I10 ESSENTIAL HYPERTENSION: ICD-10-CM

## 2025-07-08 DIAGNOSIS — E55.9 VITAMIN D DEFICIENCY: ICD-10-CM

## 2025-07-08 DIAGNOSIS — J45.40 MODERATE PERSISTENT ASTHMA WITHOUT COMPLICATION: ICD-10-CM

## 2025-07-08 DIAGNOSIS — R07.9 CHEST PAIN, UNSPECIFIED TYPE: ICD-10-CM

## 2025-07-08 DIAGNOSIS — R53.83 OTHER FATIGUE: Primary | ICD-10-CM

## 2025-07-08 DIAGNOSIS — F41.1 GENERALIZED ANXIETY DISORDER: ICD-10-CM

## 2025-07-08 DIAGNOSIS — K21.9 GASTROESOPHAGEAL REFLUX DISEASE WITHOUT ESOPHAGITIS: ICD-10-CM

## 2025-07-08 DIAGNOSIS — F90.0 ATTENTION DEFICIT HYPERACTIVITY DISORDER (ADHD), PREDOMINANTLY INATTENTIVE TYPE: ICD-10-CM

## 2025-07-08 PROCEDURE — 93000 ELECTROCARDIOGRAM COMPLETE: CPT | Performed by: NURSE PRACTITIONER

## 2025-07-08 PROCEDURE — 99214 OFFICE O/P EST MOD 30 MIN: CPT | Performed by: NURSE PRACTITIONER

## 2025-07-08 NOTE — ASSESSMENT & PLAN NOTE
Hypertension is stable and controlled-increase slightly today but very anxious  Recommend monitoring blood pressure at home and bring log to next follow-up with PCP  Continue current treatment regimen.  Blood pressure will be reassessed in 1 month.    Orders:    Lipid panel    CBC and Differential    TSH    Hemoglobin A1c    T4, Free    Urinalysis With Microscopic - Urine, Clean Catch    Vitamin B12    Folate    Vitamin D,25-Hydroxy    Magnesium    Ambulatory Referral to Cardiology for Other, Chest Pain; family history of CAD, intermittent chest pain

## 2025-07-08 NOTE — PROGRESS NOTES
"Chief Complaint  Fatigue and Numbness    Subjective        HPI   Arina presents to [facility]   as a 50-year-old female complaining of intermittent symptoms over the past week of fatigue, numbness in her hands, chest pain and nausea    She did see her PCP on 7/1/2025-states that she was not having any of these concerns at that time  Only changes on that office visit was adding Viibryd  Note from that visit  Subjective  Lissette Rea is a 50 y.o. female.      History of Present Illness    Since the last visit, she has overall felt anxious.  She has Primary Hypertension and well controlled on current medication, GERD controlled on PPI Rx, Asthma well controlled and not requiring rescue Albuterol > 2 times a week, and Vitamin D deficiency and labs are at goal >30 ng/mL.  she has been compliant with current medications have reviewed them.  The patient denies medication side effects.  Will refill medications. /76   Pulse 96   Temp 97.5 °F (36.4 °C) (Temporal)   Resp 16   Ht 157.5 cm (62.01\")   Wt 61.7 kg (136 lb)   LMP  (LMP Unknown)   SpO2 96%   BMI 24.87 kg/m² .  BMI is within normal parameters. No other follow-up for BMI required.  Weight is down  She has been taking the semaglutide compound  She has had some recent constipation and irritable bowel loose stools but not diarrhea... Still some cramping today I can send Bentyl to pharmacy  Hold semaglutide injection until her bowels settle down  Lissette Rea female 50 y.o., /76   Pulse 96   Temp 97.5 °F (36.4 °C) (Temporal)   Resp 16   Ht 157.5 cm (62.01\")   Wt 61.7 kg (136 lb)   LMP  (LMP Unknown)   SpO2 96%   BMI 24.87 kg/m²   who presents today for follow up of Depression and Anxiety.  She reports overall depression is controlled on the Wellbutrin she is having breakthrough anxiety and feels more irritable... She has more worry. Onset of symptoms was approximately several months ago. She denies " current suicidal and homicidal ideation. Risk factors are family history of anxiety and or depression and lifestyle of multiple roles.  Previous treatment includes currently on Wellbutrin and has been on Zoloft in past but had weight gain did best on Viibryd and then insurance quit covering it were discussing restarting it and see if it is back on her insurance plan now with that it is generic. She complains of the following medication side effects:weight gain. The patient declines to go to counseling..           Results for orders placed or performed in visit on 04/03/25   ToxAssure Flex 23, Ur -     Collection Time: 04/03/25  8:52 AM     Specimen: Urine   Result Value Ref Range     Report Summary FINAL       CREATININE 101 mg/dL     Amphetamines, IA Comment CUTOFF:300 ng/mL     Benzodiazepines Negative       Diazepam Urine, Qualitative Not Detected ng/mg creat     Desmethyldiazepam Not Detected ng/mg creat     Oxazepam, urine Not Detected ng/mg creat     Temazepam Not Detected ng/mg creat     Alprazolam Urine, Conf Not Detected ng/mg creat     Alpha-hydroxyalprazolam, Urine Not Detected ng/mg creat     Desalkylflurazepam, Urine Not Detected ng/mg creat     Lorazepam, Urine Not Detected ng/mg creat     Alpha-hydroxytriazolam, Urine Not Detected ng/mg creat     Clonazepam Not Detected ng/mg creat     7- AMINOCLONAZEPAM Not Detected ng/mg creat     Midazolam, Urine Not Detected ng/mg creat     Alpha-hydroxymidazolam, Urine Not Detected ng/mg creat     Flunitrazepam Not Detected ng/mg creat     DESMETHYLFLUNITRAZEPAM Not Detected ng/mg creat     COCAINE / METABOLITE, IA Negative CUTOFF:150 ng/mL     Ethyl Alcohol, Enz Negative CUTOFF:0.020 g/dL     Ethanol and Ethanol Biomarkers Negative CUTOFF:500 ng/mL     Cannavinoids IA Negative CUTOFF:20 ng/mL     6-Acetylmorphine IA Negative CUTOFF:10 ng/mL     Opiate Class IA Negative CUTOFF:100 ng/mL     Oxycodone Class IA Negative CUTOFF:100 ng/mL     METHADONE, IA Negative  CUTOFF:100 ng/mL     Methadone MTB IA Negative CUTOFF:100 ng/mL     Buprenorphine, Urine Negative       Buprenorphine, Urine Not Detected ng/mg creat     Norbuprenorphine Not Detected ng/mg creat     Fentanyl Negative       Fentanyl, Urine Not Detected ng/mg creat     Norfentanyl Urine Not Detected ng/mg creat     Tapentadol, IA Negative CUTOFF:200 ng/mL     MEPERIDINE, IA Negative CUTOFF:200 ng/mL     PROPOXYPHENE, IA Negative CUTOFF:300 ng/mL     TRAMADOL, IA Negative CUTOFF:200 ng/mL     Barbiturates, IA Negative CUTOFF:200 ng/mL     Other Hallucinogens Ur Negative       Ketamine Not Detected       Norketamine Not Detected       PHENCYCLIDINE, IA Negative CUTOFF:25 ng/mL     Gabapentin, IA Negative CUTOFF:1.0 ug/mL     Carisoprodol, IA Negative CUTOFF:100 ng/mL     SEDATIVES/HYPNOTICS Negative       Zolpidem(Ambien) Urine Not Detected       Zolpidem Acid Not Detected       Eszopiclone/Zopiclone Not Detected       Amino Chloropyridine Not Detected       Zaleplon, Ur Not Detected       Acetaminophen, IA Negative CUTOFF:5.0 ug/mL     Miscellaneous, Ur Negative       DEXTROMETHORPHAN Not Detected       Dextrorphan/Levorphanol Not Detected     Amphetamines, MS, Ur RFX -     Collection Time: 04/03/25  8:52 AM   Result Value Ref Range     Amphetamines Confirmation +POSITIVE+       METHAMPHETAMINE Not Detected ng/mg creat     AMPHETAMINE >9901 ng/mg creat     MDMA-Ecstasy Not Detected ng/mg creat     MDA Not Detected ng/mg creat            Lab Results   Component Value Date     GLUCOSE 87 10/06/2023     BUN 24 (H) 10/06/2023     CREATININE 0.83 10/06/2023      10/06/2023     K 4.7 10/06/2023      10/06/2023     CALCIUM 9.2 10/06/2023     PROTEINTOT 6.8 10/06/2023     ALBUMIN 4.4 10/06/2023     ALT 18 10/06/2023     AST 20 10/06/2023     ALKPHOS 66 10/06/2023     BILITOT 0.3 10/06/2023     GLOB 2.4 10/06/2023     AGRATIO 1.8 10/06/2023     BCR 28.9 (H) 10/06/2023     ANIONGAP 11.1 06/30/2017     EGFR 86.5  10/06/2023            Lab Results   Component Value Date     WBC 5.70 10/06/2023     HGB 13.4 10/06/2023     HCT 40.1 10/06/2023     MCV 91.8 10/06/2023      10/06/2023         Recent UTI and is on Macrobid----telehealth  Hyst  Lissette Rea 50 y.o. female who presents for ADD/ADHD follow up. They are well controlled on their current Rx.  No new problems with insomnia, tachycardia, chest pain, or unexplained weight loss. The patient has read and signed the [facility] Controlled Substance Contract.  I will continue to see patient for regular follow up appointments and give the lowest effective dose.  They are well controlled on their medication at the lowest effective dose.  She has a previous history of a diagnosis of  ADD/ADHD.   ASIF has been reviewed by me and is updated every 3 months. The patient is aware of the potential for addiction and dependence.  The Rx continues to help them concentrate, finish tasks in timely manor, and succeed.  She denies current suicidal and homicidal ideation.  Reviewed causes for potential of discontinuation of stimulant medication,  including but not limited to consideration for diversion, obtaining other controlled substances from other license practitioners, or  inappropriate office behavior.  Patient understands to use a controlled substance as prescribed by physician and to avoid improper use of controlled substances.  Patient will also verbalized understanding that prescriptions to be filled at the same pharmacy  unless office staff is made aware of this.  Patient understands they can be submitted to random urine drug screens and/or random pill counts on any request.  Patient understands they are not to receive early refills.  The patient must produce an official police report for any effort to replace controlled substances that are lost or stolen.  No use of illegal street drugs while receiving controlled substances from this prescribing provider.  The  "patient is to take medication as prescribed  and not deviate from the normal schedule without consultation from the provider.  Patient is not to share the medication with others or to take medication with alcohol or other sedatives.  Use caution when driving or operating machinery.  Must always keep the prescription in the original container.  Patient is to store controlled substances in a locked cabinet or other secure storage unit that is cool, dry and out of sunlight.  Patient must immediately notify office if this controlled substances is  improperly taken by another individual.  Reviewed risk for physical dependence, tolerance and addiction.    There is no evidence of aberrant behavior or any red flags.  .   7-1-15 [facility] [employer]  Normal EKG 3/8/2016  Negative drug screen 4-3-25  The following portions of the patient's history were reviewed and updated as appropriate: allergies, current medications, past family history, past medical history, past social history, past surgical history, and problem list.          Nausea-  Wednesday and chest pain 2-3 min again on Thursday, fatigue wed, Thursday  She did D/c the Vibryd on Thursday and has not had any chest pain since that time, but has continued to have fatigue and episodes of \"feeling numb or heavy\"  Still very anxious  She has not taken her semaglutide in the last 2 weeks, but still having some intermittent nausea  She does have a family history of CAD  She is agreeable to labs and EKG today  No acute symptoms in office today  No acute HA, blurred vision ( has had some the last couple of weeks intermittent, but no recent eye exam), she has had flushing, no acute chest pain since Thursday ( 5 days)      The following portions of the patient's history were reviewed and updated as appropriate: allergies, current medications, past family history, past medical history, past social history, past surgical history, and problem list       Review of Systems " "  Constitutional:  Positive for diaphoresis and fatigue. Negative for chills and fever.   HENT:  Negative for congestion and sore throat.    Respiratory:  Negative for cough.    Cardiovascular:  Positive for chest pain.   Gastrointestinal:  Positive for abdominal pain and nausea. Negative for vomiting.   Genitourinary:  Negative for dysuria.   Musculoskeletal:  Negative for myalgias and neck pain.   Skin:  Negative for rash.   Neurological:  Positive for weakness. Negative for numbness.   Psychiatric/Behavioral:  Positive for sleep disturbance. Negative for self-injury and suicidal ideas. The patient is nervous/anxious.         Objective   Vital Signs:   Vitals:    07/08/25 0901   BP: 146/90   Pulse: 98   Temp: 97 °F (36.1 °C)   SpO2: 99%   Weight: 63 kg (139 lb)   Height: 157.5 cm (62.01\")   PainSc: 0-No pain            1/3/2025     8:12 AM   PHQ-2/PHQ-9 Depression Screening   Little interest or pleasure in doing things Not at all   Feeling down, depressed, or hopeless Not at all   How difficult have these problems made it for you to do your work, take care of things at home, or get along with other people? Not difficult at all       BMI is >= 25 and <30. (Overweight) The following options were offered after discussion;: weight loss educational material (shared in after visit summary)        Physical Exam  Vitals reviewed.   Constitutional:       General: She is not in acute distress.  Eyes:      Conjunctiva/sclera: Conjunctivae normal.   Neck:      Thyroid: No thyromegaly.      Vascular: No carotid bruit.   Cardiovascular:      Rate and Rhythm: Normal rate and regular rhythm.      Heart sounds: Normal heart sounds. No murmur heard.  Pulmonary:      Effort: Pulmonary effort is normal. No respiratory distress.      Breath sounds: Normal breath sounds. No stridor. No wheezing, rhonchi or rales.   Chest:      Chest wall: No tenderness.   Abdominal:      General: Abdomen is flat. Bowel sounds are normal. There is no " distension.      Palpations: Abdomen is soft. There is no mass.      Tenderness: There is no abdominal tenderness. There is no right CVA tenderness, left CVA tenderness, guarding or rebound.      Hernia: No hernia is present.   Musculoskeletal:      Right lower leg: No edema.      Left lower leg: No edema.   Lymphadenopathy:      Cervical: No cervical adenopathy.   Neurological:      Mental Status: She is alert and oriented to person, place, and time.   Psychiatric:         Attention and Perception: Attention normal.         Mood and Affect: Mood is anxious.          Result Review :     The following data was reviewed by: MARILOU Louie on 07/08/2025:  Lipid panel (10/06/2023 08:51)  CBC and Differential (10/06/2023 08:51)  TSH (10/06/2023 08:51)  Hemoglobin A1c (10/06/2023 08:51)  T4, Free (10/06/2023 08:51)  Urinalysis With Microscopic - Urine, Clean Catch (10/06/2023 08:51)  Vitamin B12 (10/06/2023 08:51)  Folate (10/06/2023 08:51)  Vitamin D,25-Hydroxy (10/06/2023 08:51)  Magnesium (10/06/2023 08:51)    ECG 12 Lead    Date/Time: 7/8/2025 10:36 AM  Performed by: Ashly Mcdermott APRN    Authorized by: Ashly Mcdermott APRN  Comparison: not compared with previous ECG   Rhythm: sinus rhythm    Clinical impression: normal ECG  Comments:  P/OR: 110/138 MS  QRS: 98 MS  QT/Qtc: 374/447 MS  P/QRS/T axis: 76/66/46 deg  Heart rate: 86 bpm    Sinus rhythm  Normal ECG          Lipids at goal.  Not in menopause.  Taking B12?   A1C normal.   Eosinophils can increase with allergies   Other labs are in range.   Assessment and Plan       Other fatigue  EKG normal sinus rhythm  Checking labs today last in 2023.  Referral to cardiology  Hold Viibryd until discussing further with PCP  Orders:    ECG 12 Lead    Lipid panel    CBC and Differential    TSH    Hemoglobin A1c    T4, Free    Urinalysis With Microscopic - Urine, Clean Catch    Vitamin B12    Folate    Vitamin D,25-Hydroxy    Magnesium    Ambulatory Referral to  Cardiology for Other, Chest Pain; family history of CAD, intermittent chest pain    Chest pain, unspecified type  To ER with any acute chest pain-none in office today lungs CTA    EKG normal sinus rhythm  Checking labs today last in 2023.  Referral to cardiology  Hold Viibryd until discussing further with PCP  Orders:    ECG 12 Lead    Lipid panel    CBC and Differential    TSH    Hemoglobin A1c    T4, Free    Urinalysis With Microscopic - Urine, Clean Catch    Vitamin B12    Folate    Vitamin D,25-Hydroxy    Magnesium    Ambulatory Referral to Cardiology for Other, Chest Pain; family history of CAD, intermittent chest pain    Essential hypertension  Hypertension is stable and controlled-increase slightly today but very anxious  Recommend monitoring blood pressure at home and bring log to next follow-up with PCP  Continue current treatment regimen.  Blood pressure will be reassessed in 1 month.    Orders:    Lipid panel    CBC and Differential    TSH    Hemoglobin A1c    T4, Free    Urinalysis With Microscopic - Urine, Clean Catch    Vitamin B12    Folate    Vitamin D,25-Hydroxy    Magnesium    Ambulatory Referral to Cardiology for Other, Chest Pain; family history of CAD, intermittent chest pain    Gastroesophageal reflux disease without esophagitis  Avoid triggers including caffeine, nicotine, alcohol, spicy foods, red sauce     Orders:    Lipid panel    CBC and Differential    TSH    Hemoglobin A1c    T4, Free    Urinalysis With Microscopic - Urine, Clean Catch    Vitamin B12    Folate    Vitamin D,25-Hydroxy    Magnesium    Attention deficit hyperactivity disorder (ADHD), predominantly inattentive type  Recommend following up with PCP to discuss medication  Has done well on Adderall for several years    Orders:    Lipid panel    CBC and Differential    TSH    Hemoglobin A1c    T4, Free    Urinalysis With Microscopic - Urine, Clean Catch    Vitamin B12    Folate    Vitamin D,25-Hydroxy    Magnesium    Moderate  persistent asthma without complication  Asthma is stable.  The patient is experiencing monthly daytime asthma symptoms and she is experiencing no nighttime asthma symptoms.    Plan: Continue same medication/s without change.    Discussed medication dosage, use, side effects, and goals of treatment in detail.    Discussed distinction between quick-relief and maintenance control medications.  Discussed monitoring symptoms and use of quick-relief medications and contacting provider early in the course of exacerbations.  Warning signs of respiratory distress were reviewed with the patient.     Patient Treatment Goals: symptom prevention, minimizing limitation in activity, prevention of exacerbations and use of ER/inpatient care, maintenance of optimal pulmonary function, and minimization of adverse effects of treatment.    Followup in 6 months.            Orders:    Lipid panel    CBC and Differential    TSH    Hemoglobin A1c    T4, Free    Urinalysis With Microscopic - Urine, Clean Catch    Vitamin B12    Folate    Vitamin D,25-Hydroxy    Magnesium    Hot flashes  Increased  Checking labs  Orders:    Lipid panel    CBC and Differential    TSH    Hemoglobin A1c    T4, Free    Urinalysis With Microscopic - Urine, Clean Catch    Vitamin B12    Folate    Vitamin D,25-Hydroxy    Magnesium    Vitamin D deficiency  Recheck vitamin D with labs  Orders:    Lipid panel    CBC and Differential    TSH    Hemoglobin A1c    T4, Free    Urinalysis With Microscopic - Urine, Clean Catch    Vitamin B12    Folate    Vitamin D,25-Hydroxy    Magnesium    Generalized anxiety disorder  Increased  Hold Viibryd until discussed with PCP  No chest pain since stopping this medication         Recurrent major depressive disorder, in full remission  Patient's depression is a recurrent episode that is moderate without psychosis. Depression is in full remission and stable.    Plan:   Continue current medication therapy     Followup in 4 weeks.           To the ER immediately with any increased symptoms or chest pain    Follow Up   Return in about 4 weeks (around 8/5/2025), or if symptoms worsen or fail to improve, for follow up with PCP anxiety and B/p.  Patient was given instructions and counseling regarding her condition or for health maintenance advice. Please see specific information pulled into the AVS if appropriate.

## 2025-07-08 NOTE — ASSESSMENT & PLAN NOTE
Recommend following up with PCP to discuss medication  Has done well on Adderall for several years    Orders:    Lipid panel    CBC and Differential    TSH    Hemoglobin A1c    T4, Free    Urinalysis With Microscopic - Urine, Clean Catch    Vitamin B12    Folate    Vitamin D,25-Hydroxy    Magnesium

## 2025-07-08 NOTE — ASSESSMENT & PLAN NOTE
Asthma is stable.  The patient is experiencing monthly daytime asthma symptoms and she is experiencing no nighttime asthma symptoms.    Plan: Continue same medication/s without change.    Discussed medication dosage, use, side effects, and goals of treatment in detail.    Discussed distinction between quick-relief and maintenance control medications.  Discussed monitoring symptoms and use of quick-relief medications and contacting provider early in the course of exacerbations.  Warning signs of respiratory distress were reviewed with the patient.     Patient Treatment Goals: symptom prevention, minimizing limitation in activity, prevention of exacerbations and use of ER/inpatient care, maintenance of optimal pulmonary function, and minimization of adverse effects of treatment.    Followup in 6 months.            Orders:    Lipid panel    CBC and Differential    TSH    Hemoglobin A1c    T4, Free    Urinalysis With Microscopic - Urine, Clean Catch    Vitamin B12    Folate    Vitamin D,25-Hydroxy    Magnesium

## 2025-07-08 NOTE — ASSESSMENT & PLAN NOTE
Avoid triggers including caffeine, nicotine, alcohol, spicy foods, red sauce     Orders:    Lipid panel    CBC and Differential    TSH    Hemoglobin A1c    T4, Free    Urinalysis With Microscopic - Urine, Clean Catch    Vitamin B12    Folate    Vitamin D,25-Hydroxy    Magnesium

## 2025-07-09 LAB
25(OH)D3+25(OH)D2 SERPL-MCNC: 28.1 NG/ML (ref 30–100)
BASOPHILS # BLD AUTO: 0.06 10*3/MM3 (ref 0–0.2)
BASOPHILS NFR BLD AUTO: 0.8 % (ref 0–1.5)
CHOLEST SERPL-MCNC: 188 MG/DL (ref 0–200)
EOSINOPHIL # BLD AUTO: 0.44 10*3/MM3 (ref 0–0.4)
EOSINOPHIL NFR BLD AUTO: 5.6 % (ref 0.3–6.2)
ERYTHROCYTE [DISTWIDTH] IN BLOOD BY AUTOMATED COUNT: 11.8 % (ref 12.3–15.4)
FOLATE SERPL-MCNC: 4.83 NG/ML (ref 4.78–24.2)
HBA1C MFR BLD: 5.3 % (ref 4.8–5.6)
HCT VFR BLD AUTO: 38.1 % (ref 34–46.6)
HDLC SERPL-MCNC: 82 MG/DL (ref 40–60)
HGB BLD-MCNC: 12.7 G/DL (ref 12–15.9)
IMM GRANULOCYTES # BLD AUTO: 0.02 10*3/MM3 (ref 0–0.05)
IMM GRANULOCYTES NFR BLD AUTO: 0.3 % (ref 0–0.5)
LDLC SERPL CALC-MCNC: 94 MG/DL (ref 0–100)
LYMPHOCYTES # BLD AUTO: 1.01 10*3/MM3 (ref 0.7–3.1)
LYMPHOCYTES NFR BLD AUTO: 12.8 % (ref 19.6–45.3)
MAGNESIUM SERPL-MCNC: 2.1 MG/DL (ref 1.6–2.6)
MCH RBC QN AUTO: 31.3 PG (ref 26.6–33)
MCHC RBC AUTO-ENTMCNC: 33.3 G/DL (ref 31.5–35.7)
MCV RBC AUTO: 93.8 FL (ref 79–97)
MONOCYTES # BLD AUTO: 0.49 10*3/MM3 (ref 0.1–0.9)
MONOCYTES NFR BLD AUTO: 6.2 % (ref 5–12)
NEUTROPHILS # BLD AUTO: 5.86 10*3/MM3 (ref 1.7–7)
NEUTROPHILS NFR BLD AUTO: 74.3 % (ref 42.7–76)
NRBC BLD AUTO-RTO: 0 /100 WBC (ref 0–0.2)
PLATELET # BLD AUTO: 338 10*3/MM3 (ref 140–450)
RBC # BLD AUTO: 4.06 10*6/MM3 (ref 3.77–5.28)
T4 FREE SERPL-MCNC: 1.19 NG/DL (ref 0.92–1.68)
TRIGL SERPL-MCNC: 63 MG/DL (ref 0–150)
TSH SERPL DL<=0.005 MIU/L-ACNC: 1.98 UIU/ML (ref 0.27–4.2)
VIT B12 SERPL-MCNC: 643 PG/ML (ref 211–946)
VLDLC SERPL CALC-MCNC: 12 MG/DL (ref 5–40)
WBC # BLD AUTO: 7.88 10*3/MM3 (ref 3.4–10.8)

## 2025-07-10 ENCOUNTER — RESULTS FOLLOW-UP (OUTPATIENT)
Dept: FAMILY MEDICINE CLINIC | Facility: CLINIC | Age: 50
End: 2025-07-10
Payer: COMMERCIAL

## 2025-07-10 DIAGNOSIS — E55.9 VITAMIN D DEFICIENCY: Primary | ICD-10-CM

## 2025-07-10 RX ORDER — ERGOCALCIFEROL 1.25 MG/1
50000 CAPSULE, LIQUID FILLED ORAL
Qty: 12 CAPSULE | Refills: 2 | Status: SHIPPED | OUTPATIENT
Start: 2025-07-10

## 2025-07-10 NOTE — LETTER
Lissette M Rona  [address] [address]    July 11, 2025     Dear Ms. Rea:    Below are the results from your recent visit:    Resulted Orders   Lipid panel   Result Value Ref Range    Total Cholesterol 188 0 - 200 mg/dL      Comment:      Cholesterol Reference Ranges  (U.S. Department of Health and Human Services ATP III  Classifications)  Desirable          <200 mg/dL  Borderline High    200-239 mg/dL  High Risk          >240 mg/dL  Triglyceride Reference Ranges  (U.S. Department of Health and Human Services ATP III  Classifications)  Normal           <150 mg/dL  Borderline High  150-199 mg/dL  High             200-499 mg/dL  Very High        >500 mg/dL  HDL Reference Ranges  (U.S. Department of Health and Human Services ATP III  Classifications)  Low     <40 mg/dl (major risk factor for CHD)  High    >60 mg/dl ('negative' risk factor for CHD)  LDL Reference Ranges  (U.S. Department of Health and Human Services ATP III  Classifications)  Optimal          <100 mg/dL  Near Optimal     100-129 mg/dL  Borderline High  130-159 mg/dL  High             160-189 mg/dL  Very High        >189 mg/dL  LDL is calculated using the NIH LDL-C calculation.      Triglycerides 63 0 - 150 mg/dL    HDL Cholesterol 82 (H) 40 - 60 mg/dL    VLDL Cholesterol Jose R 12 5 - 40 mg/dL    LDL Chol Calc (NIH) 94 0 - 100 mg/dL   CBC and Differential   Result Value Ref Range    WBC 7.88 3.40 - 10.80 10*3/mm3    RBC 4.06 3.77 - 5.28 10*6/mm3    Hemoglobin 12.7 12.0 - 15.9 g/dL    Hematocrit 38.1 34.0 - 46.6 %    MCV 93.8 79.0 - 97.0 fL    MCH 31.3 26.6 - 33.0 pg    MCHC 33.3 31.5 - 35.7 g/dL    RDW 11.8 (L) 12.3 - 15.4 %    Platelets 338 140 - 450 10*3/mm3    Neutrophil Rel % 74.3 42.7 - 76.0 %    Lymphocyte Rel % 12.8 (L) 19.6 - 45.3 %    Monocyte Rel % 6.2 5.0 - 12.0 %    Eosinophil Rel % 5.6 0.3 - 6.2 %    Basophil Rel % 0.8 0.0 - 1.5 %    Neutrophils Absolute 5.86 1.70 - 7.00 10*3/mm3    Lymphocytes Absolute 1.01 0.70 - 3.10  10*3/mm3    Monocytes Absolute 0.49 0.10 - 0.90 10*3/mm3    Eosinophils Absolute 0.44 (H) 0.00 - 0.40 10*3/mm3    Basophils Absolute 0.06 0.00 - 0.20 10*3/mm3    Immature Granulocyte Rel % 0.3 0.0 - 0.5 %    Immature Grans Absolute 0.02 0.00 - 0.05 10*3/mm3    nRBC 0.0 0.0 - 0.2 /100 WBC   TSH   Result Value Ref Range    TSH 1.980 0.270 - 4.200 uIU/mL   Hemoglobin A1c   Result Value Ref Range    Hemoglobin A1C 5.30 4.80 - 5.60 %      Comment:      Hemoglobin A1C Ranges:  Increased Risk for Diabetes  5.7% to 6.4%  Diabetes                     >= 6.5%  Diabetic Goal                < 7.0%     T4, Free   Result Value Ref Range    Free T4 1.19 0.92 - 1.68 ng/dL   Vitamin B12   Result Value Ref Range    Vitamin B-12 643 211 - 946 pg/mL      Comment:      Results may be falsely increased if patient taking Biotin.   Folate   Result Value Ref Range    Folate 4.83 4.78 - 24.20 ng/mL      Comment:      Results may be falsely increased if patient taking Biotin.   Vitamin D,25-Hydroxy   Result Value Ref Range    25 Hydroxy, Vitamin D 28.1 (L) 30.0 - 100.0 ng/ml      Comment:      Reference Range for Total Vitamin D 25(OH)  Deficiency <20.0 ng/mL  Insufficiency 21-29 ng/mL  Sufficiency  ng/mL  Toxicity >100 ng/ml     Magnesium   Result Value Ref Range    Magnesium 2.1 1.6 - 2.6 mg/dL       your lab results are back!    Normal kidney, and liver enzymes,  Thyroid normal, not anemic  No concerns with blood sugar-hemoglobin A1c/3-month average is 5.3.  No concerns for diabetes  Cholesterol looks good-is at goal  HDL is good cholesterol nice and high   Normal magnesium     Vitamin D is slightly low-we will send in a supplement to take once per week to help with fatigue     No major concerns on labs.    If you have any questions or concerns, please don't hesitate to call.         Sincerely,        MARILOU Louie

## 2025-07-10 NOTE — PROGRESS NOTES
Good Afternoon Arina , your lab results are back!    Normal kidney, and liver enzymes,  Thyroid normal, not anemic  No concerns with blood sugar-hemoglobin A1c/3-month average is 5.3.  No concerns for diabetes  Cholesterol looks good-is at goal  HDL is good cholesterol nice and high   Normal magnesium    Vitamin D is slightly low-we will send in a supplement to take once per week to help with fatigue    No major concerns on labs.  Let me know if you have any questions or concerns  Ashly

## 2025-07-15 ENCOUNTER — OFFICE VISIT (OUTPATIENT)
Dept: CARDIOLOGY | Age: 50
End: 2025-07-15
Payer: COMMERCIAL

## 2025-07-15 ENCOUNTER — RESULTS FOLLOW-UP (OUTPATIENT)
Dept: CARDIOLOGY | Age: 50
End: 2025-07-15

## 2025-07-15 ENCOUNTER — LAB (OUTPATIENT)
Dept: LAB | Facility: HOSPITAL | Age: 50
End: 2025-07-15
Payer: COMMERCIAL

## 2025-07-15 VITALS
SYSTOLIC BLOOD PRESSURE: 144 MMHG | HEIGHT: 62 IN | DIASTOLIC BLOOD PRESSURE: 100 MMHG | BODY MASS INDEX: 25.36 KG/M2 | OXYGEN SATURATION: 98 % | WEIGHT: 137.8 LBS | HEART RATE: 90 BPM

## 2025-07-15 DIAGNOSIS — M48.061 SPINAL STENOSIS OF LUMBAR REGION, UNSPECIFIED WHETHER NEUROGENIC CLAUDICATION PRESENT: ICD-10-CM

## 2025-07-15 DIAGNOSIS — K21.9 GASTROESOPHAGEAL REFLUX DISEASE WITHOUT ESOPHAGITIS: ICD-10-CM

## 2025-07-15 DIAGNOSIS — I10 ESSENTIAL HYPERTENSION: Primary | ICD-10-CM

## 2025-07-15 DIAGNOSIS — J45.40 MODERATE PERSISTENT ASTHMA WITHOUT COMPLICATION: ICD-10-CM

## 2025-07-15 DIAGNOSIS — I10 ESSENTIAL HYPERTENSION: ICD-10-CM

## 2025-07-15 LAB
ALBUMIN SERPL-MCNC: 4 G/DL (ref 3.5–5.2)
ALBUMIN/GLOB SERPL: 1.2 G/DL
ALP SERPL-CCNC: 56 U/L (ref 39–117)
ALT SERPL W P-5'-P-CCNC: 17 U/L (ref 1–33)
ANION GAP SERPL CALCULATED.3IONS-SCNC: 10.6 MMOL/L (ref 5–15)
AST SERPL-CCNC: 18 U/L (ref 1–32)
BACTERIA UR QL AUTO: ABNORMAL /HPF
BILIRUB SERPL-MCNC: 0.5 MG/DL (ref 0–1.2)
BILIRUB UR QL STRIP: NEGATIVE
BUN SERPL-MCNC: 17 MG/DL (ref 6–20)
BUN/CREAT SERPL: 11.2 (ref 7–25)
CALCIUM SPEC-SCNC: 9.2 MG/DL (ref 8.6–10.5)
CHLORIDE SERPL-SCNC: 103 MMOL/L (ref 98–107)
CLARITY UR: CLEAR
CO2 SERPL-SCNC: 25.4 MMOL/L (ref 22–29)
COLOR UR: YELLOW
CREAT SERPL-MCNC: 1.52 MG/DL (ref 0.57–1)
EGFRCR SERPLBLD CKD-EPI 2021: 41.4 ML/MIN/1.73
GLOBULIN UR ELPH-MCNC: 3.3 GM/DL
GLUCOSE SERPL-MCNC: 81 MG/DL (ref 65–99)
GLUCOSE UR STRIP-MCNC: NEGATIVE MG/DL
HGB UR QL STRIP.AUTO: NEGATIVE
HYALINE CASTS UR QL AUTO: ABNORMAL /LPF
KETONES UR QL STRIP: NEGATIVE
LEUKOCYTE ESTERASE UR QL STRIP.AUTO: ABNORMAL
NITRITE UR QL STRIP: NEGATIVE
NT-PROBNP SERPL-MCNC: 109 PG/ML (ref 0–900)
PH UR STRIP.AUTO: 6.5 [PH] (ref 5–8)
POTASSIUM SERPL-SCNC: 4.3 MMOL/L (ref 3.5–5.2)
PROT SERPL-MCNC: 7.3 G/DL (ref 6–8.5)
PROT UR QL STRIP: NEGATIVE
RBC # UR STRIP: ABNORMAL /HPF
REF LAB TEST METHOD: ABNORMAL
SODIUM SERPL-SCNC: 139 MMOL/L (ref 136–145)
SP GR UR STRIP: 1.01 (ref 1–1.03)
SQUAMOUS #/AREA URNS HPF: ABNORMAL /HPF
UROBILINOGEN UR QL STRIP: ABNORMAL
WBC # UR STRIP: ABNORMAL /HPF

## 2025-07-15 PROCEDURE — 36415 COLL VENOUS BLD VENIPUNCTURE: CPT

## 2025-07-15 PROCEDURE — 83695 ASSAY OF LIPOPROTEIN(A): CPT

## 2025-07-15 PROCEDURE — 83880 ASSAY OF NATRIURETIC PEPTIDE: CPT

## 2025-07-15 PROCEDURE — 80053 COMPREHEN METABOLIC PANEL: CPT

## 2025-07-15 RX ORDER — AMLODIPINE BESYLATE 10 MG/1
10 TABLET ORAL DAILY
Qty: 90 TABLET | Refills: 3 | Status: SHIPPED | OUTPATIENT
Start: 2025-07-15 | End: 2025-10-13

## 2025-07-15 NOTE — PROGRESS NOTES
CARDIOLOGY    Paras Puentes MD    ENCOUNTER DATE:  07/15/25      Lissette Rea / 50 y.o. / female        CHIEF COMPLAINT / REASON FOR OFFICE VISIT     New Patient       HISTORY OF PRESENT ILLNESS       HPI    Lissette Rea is a 50 y.o. female with hypertension, asthma who presents establish care as a new patient.    Pt had 2 days of chest pain about two weeks ago. She was not doing anything at the time. It only lasted a minute. Described it as sharp and pressure-like, 8/10, and non-radiating. Never had similar episode in the past. She had another episode the following day but nothing since.    Today, patient has no acute complaints. Works in a school office. Walks for 30 minutes 3x per week without issues and she has been able to continue with the routine since her chest pain episode. Occasional chest fluttering. Denies dyspnea on exertion, leg edema, orthopnea, PND. Occasional dizziness, denies syncope, bleeding, or unexpected falls. Does not recall having had a heart attack or stroke. Never smoker, denies alcohol or substance abuse.     REVIEW OF SYSTEMS     Review of Systems   Constitutional: Negative for weight loss.   Cardiovascular:  Positive for chest pain and palpitations. Negative for dyspnea on exertion, irregular heartbeat, leg swelling, orthopnea, paroxysmal nocturnal dyspnea and syncope.   Respiratory:  Negative for shortness of breath.    Hematologic/Lymphatic: Negative for bleeding problem.   Musculoskeletal:  Negative for falls.   Gastrointestinal:  Negative for hematochezia and melena.   Genitourinary:  Negative for hematuria.   Neurological:  Positive for dizziness and light-headedness.   Psychiatric/Behavioral:  Negative for altered mental status.            MEDICATIONS      Outpatient Encounter Medications as of 7/15/2025   Medication Sig Dispense Refill    albuterol (PROVENTIL) (2.5 MG/3ML) 0.083% nebulizer solution Take 2.5 mg by nebulization Every 4 (Four) Hours As Needed for  "Wheezing. 120 each 3    albuterol sulfate  (90 Base) MCG/ACT inhaler Inhale 2 puffs Every 4 (Four) Hours As Needed for Wheezing. 18 g 3    amLODIPine (NORVASC) 5 MG tablet Take 1 tablet by mouth Daily. New lower dose for BP 90 tablet 1    amphetamine-dextroamphetamine (Adderall) 30 MG tablet Take 1 tablet by mouth 2 (Two) Times a Day. For ADD 60 tablet 0    budesonide-formoterol (Symbicort) 160-4.5 MCG/ACT inhaler Inhale 2 puffs 2 (Two) Times a Day. For asthma and rinse mouth after use 30.6 each 3    buPROPion XL (WELLBUTRIN XL) 300 MG 24 hr tablet Take 1 tablet by mouth Every Morning. For depression 90 tablet 3    dicyclomine (BENTYL) 10 MG capsule Take 1 capsule by mouth 4 (Four) Times a Day Before Meals & at Bedtime. (Patient taking differently: Take 1 capsule by mouth As Needed.) 30 capsule 1    esomeprazole (nexIUM) 40 MG capsule Take 1 capsule by mouth Every Morning Before Breakfast.      montelukast (SINGULAIR) 10 MG tablet Take 1 tablet by mouth Every Night. For asthma 90 tablet 3    ondansetron (Zofran) 4 MG tablet 1-2 tabs PO Q 8 hours PRN nausea 30 tablet 11    Spacer/Aero-Holding Chambers device Use U.D. With inhaler 1 each 11    tolterodine LA (DETROL LA) 4 MG 24 hr capsule TAKE 1 CAPSULE DAILY FOR URINARY URGENCY 90 capsule 3    triamterene-hydrochlorothiazide (DYAZIDE) 37.5-25 MG per capsule TAKE 1 CAPSULE EVERY MORNING FOR BLOOD PRESSURE 90 capsule 3    vitamin D (ERGOCALCIFEROL) 1.25 MG (93247 UT) capsule capsule Take 1 capsule by mouth Every 7 (Seven) Days. 12 capsule 2    vilazodone (VIIBRYD) 20 MG tablet tablet Start with 1/2 tab PO daily for 8 days then one po daily for stress 30 tablet 1     No facility-administered encounter medications on file as of 7/15/2025.         VITAL SIGNS     Visit Vitals  /100 (BP Location: Left arm, Patient Position: Sitting, Cuff Size: Adult)   Pulse 90   Ht 157.5 cm (62.01\")   Wt 62.5 kg (137 lb 12.8 oz)   LMP  (LMP Unknown)   SpO2 98%   BMI 25.20 " kg/m²         Wt Readings from Last 3 Encounters:   07/15/25 62.5 kg (137 lb 12.8 oz)   07/08/25 63 kg (139 lb)   07/01/25 61.7 kg (136 lb)     Body mass index is 25.2 kg/m².      PHYSICAL EXAMINATION     Vitals and nursing note reviewed.   Constitutional:       Appearance: Healthy appearance. Not in distress.   Neck:      Vascular: No JVR.   Pulmonary:      Effort: Pulmonary effort is normal.      Breath sounds: Normal breath sounds. No wheezing. No rhonchi. No rales.   Cardiovascular:      Normal rate. Regular rhythm.      Murmurs: There is no murmur.   Edema:     Peripheral edema absent.   Abdominal:      General: There is no distension.      Palpations: Abdomen is soft.      Tenderness: There is no abdominal tenderness.   Musculoskeletal:      Cervical back: Neck supple. Skin:     General: Skin is cool.   Neurological:      Mental Status: Alert and oriented to person, place and time.           REVIEWED DATA     Procedures      Lab Results   Component Value Date    WBC 7.88 07/08/2025    HGB 12.7 07/08/2025    HCT 38.1 07/08/2025    MCV 93.8 07/08/2025     07/08/2025       Lab Results   Component Value Date    HGBA1C 5.30 07/08/2025       Lab Results   Component Value Date    GLUCOSE 87 10/06/2023    BUN 24 (H) 10/06/2023    CREATININE 0.83 10/06/2023    EGFR 86.5 10/06/2023    BCR 28.9 (H) 10/06/2023     10/06/2023    K 4.7 10/06/2023    CO2 28.2 10/06/2023    CALCIUM 9.2 10/06/2023    ALBUMIN 4.4 10/06/2023    BILITOT 0.3 10/06/2023    AST 20 10/06/2023    ALT 18 10/06/2023       Lab Results   Component Value Date    CHLPL 188 07/08/2025    TRIG 63 07/08/2025    HDL 82 (H) 07/08/2025    LDL 94 07/08/2025       No results found for this or any previous visit.        ASSESSMENT & PLAN     Diagnoses and all orders for this visit:    1. Essential hypertension (Primary)    2. Gastroesophageal reflux disease without esophagitis    3. Spinal stenosis of lumbar region, unspecified whether neurogenic  claudication present  Overview:  Overview:   Right L5      4. Moderate persistent asthma without complication       Chest pain: Patient had 2 days of atypical chest pain couple weeks ago that has resolved.  It was nonexertional and fairly atypical for angina.  ECG earlier this month by PCP showed NSR without acute ischemic changes.  She does have risk factors including longstanding hypertension.  We will further evaluate with outpatient ECG treadmill stress test and echocardiogram.  We also discussed indication for seeking ED care.  See below regarding BP and lipid control.  Hypertension: In office /100, quite elevated, HR 90. On amlodipine 5 daily, triamterene-HCTZ 37.5-25 daily, will increase amlodipine to 10 daily, also check BMET and proBNP today.  ASCVD risk: Recent lipids acceptable, we will check LP(a) for further restratification to help guide the need for statin therapy.  See above regarding ECG treadmill stress test  Spinal stenosis: Management per primary team and other specialists.    I spent 62 minutes caring for Lissette on this date of service. This time includes time spent by me in the following activities: preparing for the visit, reviewing tests, performing a medically appropriate examination and/or evaluation, counseling and educating the patient/family/caregiver, and referring and communicating with other health care professionals.     Additionally, I am providing longitudinal care which includes continuously being a focal point for all of the patient's health care services and ongoing medical care related to chest pain, hypertension as documented above.    Paras Puentes MD. PhD. [facility]  07/15/25  12:45 EDT    Part of this note may be an electronic transcription/translation of spoken language to printed text using the Dragon dictation system.

## 2025-07-16 ENCOUNTER — PATIENT MESSAGE (OUTPATIENT)
Dept: FAMILY MEDICINE CLINIC | Facility: CLINIC | Age: 50
End: 2025-07-16
Payer: COMMERCIAL

## 2025-07-16 ENCOUNTER — PATIENT ROUNDING (BHMG ONLY) (OUTPATIENT)
Dept: CARDIOLOGY | Age: 50
End: 2025-07-16
Payer: COMMERCIAL

## 2025-07-16 DIAGNOSIS — F90.0 ATTENTION DEFICIT HYPERACTIVITY DISORDER (ADHD), PREDOMINANTLY INATTENTIVE TYPE: ICD-10-CM

## 2025-07-16 LAB — LPA SERPL-SCNC: <8.4 NMOL/L

## 2025-07-16 RX ORDER — DEXTROAMPHETAMINE SACCHARATE, AMPHETAMINE ASPARTATE, DEXTROAMPHETAMINE SULFATE AND AMPHETAMINE SULFATE 7.5; 7.5; 7.5; 7.5 MG/1; MG/1; MG/1; MG/1
30 TABLET ORAL 2 TIMES DAILY
Qty: 60 TABLET | Refills: 0 | Status: SHIPPED | OUTPATIENT
Start: 2025-07-16

## 2025-07-16 NOTE — PROGRESS NOTES
A My Chart message has been sent to the patient for PATIENT ROUNDING with [facility]

## 2025-07-23 ENCOUNTER — TELEPHONE (OUTPATIENT)
Dept: CARDIOLOGY | Age: 50
End: 2025-07-23
Payer: COMMERCIAL

## 2025-07-24 ENCOUNTER — LAB (OUTPATIENT)
Dept: LAB | Facility: HOSPITAL | Age: 50
End: 2025-07-24
Payer: COMMERCIAL

## 2025-07-24 ENCOUNTER — HOSPITAL ENCOUNTER (OUTPATIENT)
Dept: CARDIOLOGY | Facility: HOSPITAL | Age: 50
Discharge: HOME | End: 2025-07-24
Admitting: INTERNAL MEDICINE
Payer: COMMERCIAL

## 2025-07-24 ENCOUNTER — HOSPITAL ENCOUNTER (OUTPATIENT)
Dept: CARDIOLOGY | Facility: HOSPITAL | Age: 50
Discharge: HOME | End: 2025-07-24
Payer: COMMERCIAL

## 2025-07-24 VITALS
DIASTOLIC BLOOD PRESSURE: 90 MMHG | WEIGHT: 137 LBS | SYSTOLIC BLOOD PRESSURE: 148 MMHG | HEIGHT: 62 IN | HEART RATE: 80 BPM | BODY MASS INDEX: 25.21 KG/M2

## 2025-07-24 DIAGNOSIS — I10 ESSENTIAL HYPERTENSION: ICD-10-CM

## 2025-07-24 DIAGNOSIS — K21.9 GASTROESOPHAGEAL REFLUX DISEASE WITHOUT ESOPHAGITIS: ICD-10-CM

## 2025-07-24 DIAGNOSIS — M48.061 SPINAL STENOSIS OF LUMBAR REGION, UNSPECIFIED WHETHER NEUROGENIC CLAUDICATION PRESENT: ICD-10-CM

## 2025-07-24 LAB
ANION GAP SERPL CALCULATED.3IONS-SCNC: 10.2 MMOL/L (ref 5–15)
AORTIC ARCH: 2.2 CM
AORTIC DIMENSIONLESS INDEX: 0.78 (DI)
ASCENDING AORTA: 2.5 CM
AV MEAN PRESS GRAD SYS DOP V1V2: 4 MMHG
AV VMAX SYS DOP: 140 CM/SEC
BH CV ECHO LEFT VENTRICLE GLOBAL LONGITUDINAL STRAIN: -20.2 %
BH CV ECHO MEAS - ACS: 1.77 CM
BH CV ECHO MEAS - AO MAX PG: 7.8 MMHG
BH CV ECHO MEAS - AO ROOT AREA (BSA CORRECTED): 1.7 CM2
BH CV ECHO MEAS - AO ROOT DIAM: 2.8 CM
BH CV ECHO MEAS - AO V2 VTI: 24 CM
BH CV ECHO MEAS - AVA(I,D): 2.49 CM2
BH CV ECHO MEAS - EDV(CUBED): 125 ML
BH CV ECHO MEAS - EDV(MOD-SP2): 61 ML
BH CV ECHO MEAS - EDV(MOD-SP4): 48 ML
BH CV ECHO MEAS - EF(MOD-SP2): 57.4 %
BH CV ECHO MEAS - EF(MOD-SP4): 58.3 %
BH CV ECHO MEAS - ESV(CUBED): 35.6 ML
BH CV ECHO MEAS - ESV(MOD-SP2): 26 ML
BH CV ECHO MEAS - ESV(MOD-SP4): 20 ML
BH CV ECHO MEAS - FS: 34.2 %
BH CV ECHO MEAS - IVS/LVPW: 1 CM
BH CV ECHO MEAS - IVSD: 0.8 CM
BH CV ECHO MEAS - LA 3D VOL INDEX: 23
BH CV ECHO MEAS - LAT PEAK E' VEL: 10.3 CM/SEC
BH CV ECHO MEAS - LV DIASTOLIC VOL/BSA (35-75): 29.7 CM2
BH CV ECHO MEAS - LV MASS(C)D: 135.8 GRAMS
BH CV ECHO MEAS - LV MAX PG: 4.6 MMHG
BH CV ECHO MEAS - LV MEAN PG: 2 MMHG
BH CV ECHO MEAS - LV SYSTOLIC VOL/BSA (12-30): 12.4 CM2
BH CV ECHO MEAS - LV V1 MAX: 107 CM/SEC
BH CV ECHO MEAS - LV V1 VTI: 18.8 CM
BH CV ECHO MEAS - LVIDD: 5 CM
BH CV ECHO MEAS - LVIDS: 3.3 CM
BH CV ECHO MEAS - LVOT AREA: 3.2 CM2
BH CV ECHO MEAS - LVOT DIAM: 2.01 CM
BH CV ECHO MEAS - LVPWD: 0.8 CM
BH CV ECHO MEAS - MED PEAK E' VEL: 14.7 CM/SEC
BH CV ECHO MEAS - MV A DUR: 0.13 SEC
BH CV ECHO MEAS - MV A MAX VEL: 107 CM/SEC
BH CV ECHO MEAS - MV DEC SLOPE: 722 CM/SEC2
BH CV ECHO MEAS - MV DEC TIME: 0.15 SEC
BH CV ECHO MEAS - MV E MAX VEL: 84 CM/SEC
BH CV ECHO MEAS - MV E/A: 0.79
BH CV ECHO MEAS - MV MAX PG: 6.4 MMHG
BH CV ECHO MEAS - MV MEAN PG: 3.5 MMHG
BH CV ECHO MEAS - MV P1/2T: 48.9 MSEC
BH CV ECHO MEAS - MV V2 VTI: 24.3 CM
BH CV ECHO MEAS - MVA(P1/2T): 4.5 CM2
BH CV ECHO MEAS - MVA(VTI): 2.45 CM2
BH CV ECHO MEAS - PA ACC TIME: 0.17 SEC
BH CV ECHO MEAS - PA V2 MAX: 89.6 CM/SEC
BH CV ECHO MEAS - PULM A REVS DUR: 0.11 SEC
BH CV ECHO MEAS - PULM A REVS VEL: 31.2 CM/SEC
BH CV ECHO MEAS - PULM DIAS VEL: 37.2 CM/SEC
BH CV ECHO MEAS - PULM S/D: 1.32
BH CV ECHO MEAS - PULM SYS VEL: 49.2 CM/SEC
BH CV ECHO MEAS - QP/QS: 0.87
BH CV ECHO MEAS - RAP SYSTOLE: 3 MMHG
BH CV ECHO MEAS - RV MAX PG: 1.88 MMHG
BH CV ECHO MEAS - RV V1 MAX: 68.6 CM/SEC
BH CV ECHO MEAS - RV V1 VTI: 14 CM
BH CV ECHO MEAS - RVOT DIAM: 2.17 CM
BH CV ECHO MEAS - RVSP: 34.1 MMHG
BH CV ECHO MEAS - SUP REN AO DIAM: 2 CM
BH CV ECHO MEAS - SV(LVOT): 59.7 ML
BH CV ECHO MEAS - SV(MOD-SP2): 35 ML
BH CV ECHO MEAS - SV(MOD-SP4): 28 ML
BH CV ECHO MEAS - SV(RVOT): 51.8 ML
BH CV ECHO MEAS - SVI(LVOT): 36.9 ML/M2
BH CV ECHO MEAS - SVI(MOD-SP2): 21.6 ML/M2
BH CV ECHO MEAS - SVI(MOD-SP4): 17.3 ML/M2
BH CV ECHO MEAS - TAPSE (>1.6): 2.19 CM
BH CV ECHO MEAS - TR MAX PG: 31.1 MMHG
BH CV ECHO MEAS - TR MAX VEL: 279 CM/SEC
BH CV ECHO MEASUREMENTS AVERAGE E/E' RATIO: 6.72
BH CV STRESS BP STAGE 1: NORMAL
BH CV STRESS BP STAGE 2: NORMAL
BH CV STRESS BP STAGE 3: NORMAL
BH CV STRESS DURATION MIN STAGE 1: 3
BH CV STRESS DURATION MIN STAGE 2: 3
BH CV STRESS DURATION MIN STAGE 3: 3
BH CV STRESS DURATION SEC STAGE 1: 0
BH CV STRESS DURATION SEC STAGE 2: 0
BH CV STRESS DURATION SEC STAGE 3: 0
BH CV STRESS GRADE STAGE 1: 10
BH CV STRESS GRADE STAGE 2: 12
BH CV STRESS GRADE STAGE 3: 14
BH CV STRESS HR STAGE 1: 126
BH CV STRESS HR STAGE 2: 145
BH CV STRESS HR STAGE 3: 158
BH CV STRESS METS STAGE 1: 5
BH CV STRESS METS STAGE 2: 7.5
BH CV STRESS METS STAGE 3: 10
BH CV STRESS PROTOCOL 1: NORMAL
BH CV STRESS RECOVERY BP: NORMAL MMHG
BH CV STRESS RECOVERY HR: 103 BPM
BH CV STRESS SPEED STAGE 1: 1.7
BH CV STRESS SPEED STAGE 2: 2.5
BH CV STRESS SPEED STAGE 3: 3.4
BH CV STRESS STAGE 1: 1
BH CV STRESS STAGE 2: 2
BH CV STRESS STAGE 3: 3
BH CV XLRA - RV BASE: 2.8 CM
BH CV XLRA - RV LENGTH: 6.2 CM
BH CV XLRA - RV MID: 2.49 CM
BH CV XLRA - TDI S': 16.8 CM/SEC
BUN SERPL-MCNC: 18 MG/DL (ref 6–20)
BUN/CREAT SERPL: 11.9 (ref 7–25)
CALCIUM SPEC-SCNC: 9.2 MG/DL (ref 8.6–10.5)
CHLORIDE SERPL-SCNC: 102 MMOL/L (ref 98–107)
CO2 SERPL-SCNC: 26.8 MMOL/L (ref 22–29)
CREAT SERPL-MCNC: 1.51 MG/DL (ref 0.57–1)
EGFRCR SERPLBLD CKD-EPI 2021: 41.7 ML/MIN/1.73
GLUCOSE SERPL-MCNC: 80 MG/DL (ref 65–99)
LEFT ATRIUM VOLUME INDEX: 20.6 ML/M2
LV EF 3D SEGMENTATION: 57 %
LV EF BIPLANE MOD: 57.4 %
MAXIMAL PREDICTED HEART RATE: 169 BPM
PERCENT MAX PREDICTED HR: 93.49 %
POTASSIUM SERPL-SCNC: 3.7 MMOL/L (ref 3.5–5.2)
SINUS: 2.8 CM
SODIUM SERPL-SCNC: 139 MMOL/L (ref 136–145)
STJ: 2.26 CM
STRESS BASELINE BP: NORMAL MMHG
STRESS BASELINE HR: 93 BPM
STRESS PERCENT HR: 110 %
STRESS POST ESTIMATED WORKLOAD: 10 METS
STRESS POST EXERCISE DUR MIN: 9 MIN
STRESS POST EXERCISE DUR SEC: 0 SEC
STRESS POST PEAK BP: NORMAL MMHG
STRESS POST PEAK HR: 158 BPM
STRESS TARGET HR: 144 BPM

## 2025-07-24 PROCEDURE — 93306 TTE W/DOPPLER COMPLETE: CPT

## 2025-07-24 PROCEDURE — 93356 MYOCRD STRAIN IMG SPCKL TRCK: CPT

## 2025-07-24 PROCEDURE — 93017 CV STRESS TEST TRACING ONLY: CPT

## 2025-07-24 PROCEDURE — 80048 BASIC METABOLIC PNL TOTAL CA: CPT

## 2025-07-24 PROCEDURE — 36415 COLL VENOUS BLD VENIPUNCTURE: CPT

## 2025-07-25 NOTE — TELEPHONE ENCOUNTER
Please inform patient her kidney function remained stable  compared to prior.  Treadmill stress test shows no evidence of tightly blocked coronary artery.  Her echocardiogram shows her heart is strong when it squeezes and there is no significant valve disease.  There is a small amount of fluid in the heart sac but there is no compression of the heart.  No additional testing recommended.  Okay to keep October appointment to follow-up

## 2025-08-01 ENCOUNTER — TELEPHONE (OUTPATIENT)
Dept: CARDIOLOGY | Age: 50
End: 2025-08-01
Payer: COMMERCIAL

## 2025-08-04 RX ORDER — METOPROLOL SUCCINATE 50 MG/1
50 TABLET, EXTENDED RELEASE ORAL DAILY
Qty: 90 TABLET | Refills: 3 | Status: SHIPPED | OUTPATIENT
Start: 2025-08-04

## 2025-08-17 ENCOUNTER — PATIENT MESSAGE (OUTPATIENT)
Dept: FAMILY MEDICINE CLINIC | Facility: CLINIC | Age: 50
End: 2025-08-17
Payer: COMMERCIAL

## 2025-08-17 DIAGNOSIS — F90.0 ATTENTION DEFICIT HYPERACTIVITY DISORDER (ADHD), PREDOMINANTLY INATTENTIVE TYPE: ICD-10-CM

## 2025-08-18 RX ORDER — DEXTROAMPHETAMINE SACCHARATE, AMPHETAMINE ASPARTATE, DEXTROAMPHETAMINE SULFATE AND AMPHETAMINE SULFATE 7.5; 7.5; 7.5; 7.5 MG/1; MG/1; MG/1; MG/1
30 TABLET ORAL 2 TIMES DAILY
Qty: 60 TABLET | Refills: 0 | Status: SHIPPED | OUTPATIENT
Start: 2025-08-18